# Patient Record
Sex: MALE | Race: WHITE | NOT HISPANIC OR LATINO | Employment: UNEMPLOYED | URBAN - NONMETROPOLITAN AREA
[De-identification: names, ages, dates, MRNs, and addresses within clinical notes are randomized per-mention and may not be internally consistent; named-entity substitution may affect disease eponyms.]

---

## 2019-01-01 ENCOUNTER — HOSPITAL ENCOUNTER (EMERGENCY)
Facility: HOSPITAL | Age: 49
Discharge: HOME | End: 2019-01-02
Attending: EMERGENCY MEDICINE | Admitting: EMERGENCY MEDICINE

## 2019-01-01 DIAGNOSIS — K04.7 DENTAL ABSCESS: Primary | ICD-10-CM

## 2019-01-01 PROCEDURE — 96372 THER/PROPH/DIAG INJ SC/IM: CPT

## 2019-01-01 PROCEDURE — 25010000002 CEFTRIAXONE PER 250 MG: Performed by: PHYSICIAN ASSISTANT

## 2019-01-01 PROCEDURE — 25010000002 KETOROLAC TROMETHAMINE PER 15 MG: Performed by: PHYSICIAN ASSISTANT

## 2019-01-01 PROCEDURE — 99283 EMERGENCY DEPT VISIT LOW MDM: CPT

## 2019-01-01 RX ORDER — KETOROLAC TROMETHAMINE 30 MG/ML
30 INJECTION, SOLUTION INTRAMUSCULAR; INTRAVENOUS ONCE
Status: COMPLETED | OUTPATIENT
Start: 2019-01-01 | End: 2019-01-01

## 2019-01-01 RX ORDER — CLINDAMYCIN HYDROCHLORIDE 300 MG/1
300 CAPSULE ORAL 3 TIMES DAILY
Qty: 21 CAPSULE | Refills: 0 | Status: SHIPPED | OUTPATIENT
Start: 2019-01-01

## 2019-01-01 RX ORDER — CEFTRIAXONE 1 G/1
1000 INJECTION, POWDER, FOR SOLUTION INTRAMUSCULAR; INTRAVENOUS ONCE
Status: COMPLETED | OUTPATIENT
Start: 2019-01-01 | End: 2019-01-01

## 2019-01-01 RX ORDER — LIDOCAINE HYDROCHLORIDE 10 MG/ML
2.1 INJECTION, SOLUTION EPIDURAL; INFILTRATION; INTRACAUDAL; PERINEURAL ONCE
Status: COMPLETED | OUTPATIENT
Start: 2019-01-01 | End: 2019-01-01

## 2019-01-01 RX ADMIN — KETOROLAC TROMETHAMINE 30 MG: 30 INJECTION, SOLUTION INTRAMUSCULAR; INTRAVENOUS at 23:32

## 2019-01-01 RX ADMIN — BENZOCAINE: 200 LIQUID DENTAL; ORAL; PERIODONTAL at 23:33

## 2019-01-01 RX ADMIN — LIDOCAINE HYDROCHLORIDE 2.1 ML: 10 INJECTION, SOLUTION EPIDURAL; INFILTRATION; INTRACAUDAL; PERINEURAL at 23:32

## 2019-01-01 RX ADMIN — CEFTRIAXONE SODIUM 1000 MG: 1 INJECTION, POWDER, FOR SOLUTION INTRAMUSCULAR; INTRAVENOUS at 23:32

## 2019-01-02 VITALS
DIASTOLIC BLOOD PRESSURE: 92 MMHG | HEART RATE: 100 BPM | SYSTOLIC BLOOD PRESSURE: 166 MMHG | BODY MASS INDEX: 35.21 KG/M2 | HEIGHT: 72 IN | WEIGHT: 260 LBS | RESPIRATION RATE: 16 BRPM | OXYGEN SATURATION: 98 % | TEMPERATURE: 98.9 F

## 2019-01-02 NOTE — ED PROVIDER NOTES
Subjective     History provided by:  Patient   used: No    Dental Pain   Location:  Lower  Lower teeth location:  18/LL 2nd molar  Quality:  Aching, dull and constant  Severity:  Moderate  Chronicity:  New  Context: abscess, dental caries, dental fracture and poor dentition    Relieved by:  Nothing  Worsened by:  Cold food/drink, hot food/drink, touching and jaw movement  Ineffective treatments:  Topical anesthetic gel  Associated symptoms: gum swelling    Associated symptoms: no difficulty swallowing and no fever    Risk factors: lack of dental care    Risk factors: no smoking        Review of Systems   Constitutional: Negative.  Negative for fever.   HENT: Positive for dental problem.    Respiratory: Negative.    Cardiovascular: Negative.  Negative for chest pain.   Gastrointestinal: Negative.  Negative for abdominal pain.   Endocrine: Negative.    Genitourinary: Negative.  Negative for dysuria.   Skin: Negative.    Neurological: Negative.    Psychiatric/Behavioral: Negative.    All other systems reviewed and are negative.      Past Medical History:   Diagnosis Date   • Back injury    • Hypertension        No Known Allergies    Past Surgical History:   Procedure Laterality Date   • BACK SURGERY  2017       History reviewed. No pertinent family history.    Social History     Socioeconomic History   • Marital status:      Spouse name: Not on file   • Number of children: Not on file   • Years of education: Not on file   • Highest education level: Not on file   Tobacco Use   • Smoking status: Never Smoker   Substance and Sexual Activity   • Alcohol use: No     Frequency: Never   • Drug use: Yes     Types: Marijuana   • Sexual activity: Defer           Objective   Physical Exam   Constitutional: He is oriented to person, place, and time. He appears well-developed and well-nourished. No distress.   HENT:   Head: Normocephalic and atraumatic.   Right Ear: External ear normal.   Left Ear:  External ear normal.   Nose: Nose normal.   Mouth/Throat: Uvula is midline. No trismus in the jaw. Abnormal dentition. Dental abscesses and dental caries present.       Eyes: Conjunctivae and EOM are normal. Pupils are equal, round, and reactive to light.   Neck: Normal range of motion. Neck supple. No JVD present. No tracheal deviation present.   Cardiovascular: Normal rate, regular rhythm and normal heart sounds.   No murmur heard.  Pulmonary/Chest: Effort normal and breath sounds normal. No respiratory distress. He has no wheezes.   Abdominal: Soft. Bowel sounds are normal. There is no tenderness.   Musculoskeletal: Normal range of motion. He exhibits no edema or deformity.   Neurological: He is alert and oriented to person, place, and time. No cranial nerve deficit.   Skin: Skin is warm and dry. No rash noted. He is not diaphoretic. No erythema. No pallor.   Psychiatric: He has a normal mood and affect. His behavior is normal. Thought content normal.   Nursing note and vitals reviewed.      Procedures           ED Course                  MDM  Number of Diagnoses or Management Options  Dental abscess: new and does not require workup  Risk of Complications, Morbidity, and/or Mortality  Presenting problems: moderate  Diagnostic procedures: minimal  Management options: moderate    Patient Progress  Patient progress: stable        Final diagnoses:   Dental abscess            Kartik Alba PA-C  01/02/19 6960

## 2021-01-04 ENCOUNTER — HOSPITAL ENCOUNTER (INPATIENT)
Facility: HOSPITAL | Age: 51
LOS: 3 days | Discharge: HOME | DRG: 383 | End: 2021-01-07
Attending: EMERGENCY MEDICINE | Admitting: INTERNAL MEDICINE
Payer: COMMERCIAL

## 2021-01-04 DIAGNOSIS — L03.115 CELLULITIS OF RIGHT LOWER EXTREMITY: Primary | ICD-10-CM

## 2021-01-04 PROBLEM — L03.90 CELLULITIS: Status: ACTIVE | Noted: 2021-01-04

## 2021-01-04 LAB
A/G RATIO: 1.3 (ref 0.8–2)
ALBUMIN SERPL-MCNC: 4.4 G/DL (ref 3.4–4.8)
ALP BLD-CCNC: 71 U/L (ref 25–100)
ALT SERPL-CCNC: 122 U/L (ref 4–36)
ANION GAP SERPL CALCULATED.3IONS-SCNC: 13 MMOL/L (ref 3–16)
AST SERPL-CCNC: 60 U/L (ref 8–33)
BASOPHILS ABSOLUTE: 0.1 K/UL (ref 0–0.1)
BASOPHILS RELATIVE PERCENT: 0.6 %
BILIRUB SERPL-MCNC: 0.6 MG/DL (ref 0.3–1.2)
BUN BLDV-MCNC: 11 MG/DL (ref 6–20)
CALCIUM SERPL-MCNC: 9.4 MG/DL (ref 8.5–10.5)
CHLORIDE BLD-SCNC: 98 MMOL/L (ref 98–107)
CO2: 24 MMOL/L (ref 20–30)
CREAT SERPL-MCNC: 0.7 MG/DL (ref 0.4–1.2)
EOSINOPHILS ABSOLUTE: 0.6 K/UL (ref 0–0.4)
EOSINOPHILS RELATIVE PERCENT: 4.7 %
GFR AFRICAN AMERICAN: >59
GFR NON-AFRICAN AMERICAN: >59
GLOBULIN: 3.3 G/DL
GLUCOSE BLD-MCNC: 93 MG/DL (ref 74–106)
HCT VFR BLD CALC: 51.3 % (ref 40–54)
HEMOGLOBIN: 17.2 G/DL (ref 13–18)
IMMATURE GRANULOCYTES #: 0.1 K/UL
IMMATURE GRANULOCYTES %: 0.4 % (ref 0–5)
LACTIC ACID: 1.8 MMOL/L (ref 0.4–2)
LYMPHOCYTES ABSOLUTE: 2.5 K/UL (ref 1.5–4)
LYMPHOCYTES RELATIVE PERCENT: 20.7 %
MCH RBC QN AUTO: 32.8 PG (ref 27–32)
MCHC RBC AUTO-ENTMCNC: 33.5 G/DL (ref 31–35)
MCV RBC AUTO: 97.9 FL (ref 80–100)
MONOCYTES ABSOLUTE: 1.1 K/UL (ref 0.2–0.8)
MONOCYTES RELATIVE PERCENT: 9.5 %
NEUTROPHILS ABSOLUTE: 7.7 K/UL (ref 2–7.5)
NEUTROPHILS RELATIVE PERCENT: 64.1 %
PDW BLD-RTO: 12.9 % (ref 11–16)
PLATELET # BLD: 181 K/UL (ref 150–400)
PMV BLD AUTO: 10.3 FL (ref 6–10)
POTASSIUM SERPL-SCNC: 4.2 MMOL/L (ref 3.4–5.1)
RBC # BLD: 5.24 M/UL (ref 4.5–6)
SARS-COV-2, NAAT: NOT DETECTED
SARS-COV-2, PCR: NORMAL
SEDIMENTATION RATE, ERYTHROCYTE: 8 MM/HR (ref 0–15)
SODIUM BLD-SCNC: 135 MMOL/L (ref 136–145)
TOTAL PROTEIN: 7.7 G/DL (ref 6.4–8.3)
WBC # BLD: 12 K/UL (ref 4–11)

## 2021-01-04 PROCEDURE — 83605 ASSAY OF LACTIC ACID: CPT

## 2021-01-04 PROCEDURE — 96365 THER/PROPH/DIAG IV INF INIT: CPT

## 2021-01-04 PROCEDURE — 80053 COMPREHEN METABOLIC PANEL: CPT

## 2021-01-04 PROCEDURE — 6360000002 HC RX W HCPCS: Performed by: PHYSICIAN ASSISTANT

## 2021-01-04 PROCEDURE — 6370000000 HC RX 637 (ALT 250 FOR IP): Performed by: PHYSICIAN ASSISTANT

## 2021-01-04 PROCEDURE — U0003 INFECTIOUS AGENT DETECTION BY NUCLEIC ACID (DNA OR RNA); SEVERE ACUTE RESPIRATORY SYNDROME CORONAVIRUS 2 (SARS-COV-2) (CORONAVIRUS DISEASE [COVID-19]), AMPLIFIED PROBE TECHNIQUE, MAKING USE OF HIGH THROUGHPUT TECHNOLOGIES AS DESCRIBED BY CMS-2020-01-R: HCPCS

## 2021-01-04 PROCEDURE — 36415 COLL VENOUS BLD VENIPUNCTURE: CPT

## 2021-01-04 PROCEDURE — 99282 EMERGENCY DEPT VISIT SF MDM: CPT

## 2021-01-04 PROCEDURE — 87040 BLOOD CULTURE FOR BACTERIA: CPT

## 2021-01-04 PROCEDURE — 6360000002 HC RX W HCPCS: Performed by: EMERGENCY MEDICINE

## 2021-01-04 PROCEDURE — U0002 COVID-19 LAB TEST NON-CDC: HCPCS

## 2021-01-04 PROCEDURE — 2580000003 HC RX 258: Performed by: EMERGENCY MEDICINE

## 2021-01-04 PROCEDURE — 2500000003 HC RX 250 WO HCPCS: Performed by: EMERGENCY MEDICINE

## 2021-01-04 PROCEDURE — 86140 C-REACTIVE PROTEIN: CPT

## 2021-01-04 PROCEDURE — 85025 COMPLETE CBC W/AUTO DIFF WBC: CPT

## 2021-01-04 PROCEDURE — 85652 RBC SED RATE AUTOMATED: CPT

## 2021-01-04 PROCEDURE — 96375 TX/PRO/DX INJ NEW DRUG ADDON: CPT

## 2021-01-04 PROCEDURE — 1200000000 HC SEMI PRIVATE

## 2021-01-04 RX ORDER — ACETAMINOPHEN 650 MG/1
650 SUPPOSITORY RECTAL EVERY 6 HOURS PRN
Status: DISCONTINUED | OUTPATIENT
Start: 2021-01-04 | End: 2021-01-07 | Stop reason: HOSPADM

## 2021-01-04 RX ORDER — FAMOTIDINE 20 MG/1
20 TABLET, FILM COATED ORAL 2 TIMES DAILY
Status: DISCONTINUED | OUTPATIENT
Start: 2021-01-04 | End: 2021-01-07 | Stop reason: HOSPADM

## 2021-01-04 RX ORDER — PROMETHAZINE HYDROCHLORIDE 25 MG/1
12.5 TABLET ORAL EVERY 6 HOURS PRN
Status: DISCONTINUED | OUTPATIENT
Start: 2021-01-04 | End: 2021-01-07 | Stop reason: HOSPADM

## 2021-01-04 RX ORDER — ONDANSETRON 2 MG/ML
4 INJECTION INTRAMUSCULAR; INTRAVENOUS ONCE
Status: COMPLETED | OUTPATIENT
Start: 2021-01-04 | End: 2021-01-04

## 2021-01-04 RX ORDER — ACETAMINOPHEN 325 MG/1
650 TABLET ORAL EVERY 6 HOURS PRN
Status: DISCONTINUED | OUTPATIENT
Start: 2021-01-04 | End: 2021-01-07 | Stop reason: HOSPADM

## 2021-01-04 RX ORDER — OXYCODONE HYDROCHLORIDE 5 MG/1
5 TABLET ORAL EVERY 4 HOURS PRN
Status: DISCONTINUED | OUTPATIENT
Start: 2021-01-04 | End: 2021-01-05

## 2021-01-04 RX ORDER — POLYETHYLENE GLYCOL 3350 17 G/17G
17 POWDER, FOR SOLUTION ORAL DAILY PRN
Status: DISCONTINUED | OUTPATIENT
Start: 2021-01-04 | End: 2021-01-07 | Stop reason: HOSPADM

## 2021-01-04 RX ORDER — ONDANSETRON 2 MG/ML
4 INJECTION INTRAMUSCULAR; INTRAVENOUS EVERY 6 HOURS PRN
Status: DISCONTINUED | OUTPATIENT
Start: 2021-01-04 | End: 2021-01-07 | Stop reason: HOSPADM

## 2021-01-04 RX ORDER — CLINDAMYCIN PHOSPHATE 900 MG/50ML
900 INJECTION INTRAVENOUS ONCE
Status: COMPLETED | OUTPATIENT
Start: 2021-01-04 | End: 2021-01-04

## 2021-01-04 RX ORDER — MORPHINE SULFATE 10 MG/ML
10 INJECTION, SOLUTION INTRAMUSCULAR; INTRAVENOUS ONCE
Status: COMPLETED | OUTPATIENT
Start: 2021-01-04 | End: 2021-01-04

## 2021-01-04 RX ORDER — 0.9 % SODIUM CHLORIDE 0.9 %
1000 INTRAVENOUS SOLUTION INTRAVENOUS ONCE
Status: COMPLETED | OUTPATIENT
Start: 2021-01-04 | End: 2021-01-04

## 2021-01-04 RX ORDER — CLINDAMYCIN PHOSPHATE 600 MG/50ML
600 INJECTION INTRAVENOUS EVERY 8 HOURS
Status: DISCONTINUED | OUTPATIENT
Start: 2021-01-05 | End: 2021-01-07 | Stop reason: HOSPADM

## 2021-01-04 RX ADMIN — FAMOTIDINE 20 MG: 20 TABLET, FILM COATED ORAL at 21:56

## 2021-01-04 RX ADMIN — ENOXAPARIN SODIUM 40 MG: 40 INJECTION SUBCUTANEOUS at 21:56

## 2021-01-04 RX ADMIN — OXYCODONE HYDROCHLORIDE 5 MG: 5 TABLET ORAL at 21:56

## 2021-01-04 RX ADMIN — MORPHINE SULFATE 10 MG: 10 INJECTION INTRAVENOUS at 20:30

## 2021-01-04 RX ADMIN — SODIUM CHLORIDE 1000 ML: 9 INJECTION, SOLUTION INTRAVENOUS at 20:30

## 2021-01-04 RX ADMIN — ONDANSETRON 4 MG: 2 INJECTION INTRAMUSCULAR; INTRAVENOUS at 20:30

## 2021-01-04 RX ADMIN — CLINDAMYCIN IN 5 PERCENT DEXTROSE 900 MG: 18 INJECTION, SOLUTION INTRAVENOUS at 20:30

## 2021-01-04 ASSESSMENT — PAIN DESCRIPTION - DESCRIPTORS: DESCRIPTORS: STABBING;SHARP

## 2021-01-04 ASSESSMENT — PAIN DESCRIPTION - PROGRESSION: CLINICAL_PROGRESSION: GRADUALLY WORSENING

## 2021-01-04 ASSESSMENT — PAIN DESCRIPTION - LOCATION: LOCATION: LEG

## 2021-01-04 ASSESSMENT — PAIN DESCRIPTION - FREQUENCY: FREQUENCY: CONTINUOUS

## 2021-01-04 ASSESSMENT — PAIN SCALES - GENERAL: PAINLEVEL_OUTOF10: 10

## 2021-01-05 ENCOUNTER — APPOINTMENT (OUTPATIENT)
Dept: CT IMAGING | Facility: HOSPITAL | Age: 51
DRG: 383 | End: 2021-01-05
Payer: COMMERCIAL

## 2021-01-05 PROBLEM — I10 HYPERTENSION: Status: ACTIVE | Noted: 2021-01-05

## 2021-01-05 PROBLEM — E66.811 OBESITY (BMI 30.0-34.9): Status: ACTIVE | Noted: 2021-01-05

## 2021-01-05 PROBLEM — E66.9 OBESITY (BMI 30.0-34.9): Status: ACTIVE | Noted: 2021-01-05

## 2021-01-05 PROBLEM — R74.01 TRANSAMINITIS: Status: ACTIVE | Noted: 2021-01-05

## 2021-01-05 PROBLEM — Z72.0 TOBACCO ABUSE: Status: ACTIVE | Noted: 2021-01-05

## 2021-01-05 LAB
A/G RATIO: 1.1 (ref 0.8–2)
ALBUMIN SERPL-MCNC: 3.7 G/DL (ref 3.4–4.8)
ALP BLD-CCNC: 61 U/L (ref 25–100)
ALT SERPL-CCNC: 98 U/L (ref 4–36)
ANION GAP SERPL CALCULATED.3IONS-SCNC: 11 MMOL/L (ref 3–16)
AST SERPL-CCNC: 48 U/L (ref 8–33)
BILIRUB SERPL-MCNC: 0.5 MG/DL (ref 0.3–1.2)
BUN BLDV-MCNC: 10 MG/DL (ref 6–20)
C-REACTIVE PROTEIN: 41.9 MG/L (ref 0–5.1)
CALCIUM SERPL-MCNC: 8.5 MG/DL (ref 8.5–10.5)
CHLORIDE BLD-SCNC: 99 MMOL/L (ref 98–107)
CO2: 24 MMOL/L (ref 20–30)
CREAT SERPL-MCNC: 0.7 MG/DL (ref 0.4–1.2)
GFR AFRICAN AMERICAN: >59
GFR NON-AFRICAN AMERICAN: >59
GLOBULIN: 3.4 G/DL
GLUCOSE BLD-MCNC: 84 MG/DL (ref 74–106)
HCT VFR BLD CALC: 45.5 % (ref 40–54)
HEMOGLOBIN: 15.3 G/DL (ref 13–18)
MCH RBC QN AUTO: 32.6 PG (ref 27–32)
MCHC RBC AUTO-ENTMCNC: 33.6 G/DL (ref 31–35)
MCV RBC AUTO: 97 FL (ref 80–100)
PDW BLD-RTO: 12.9 % (ref 11–16)
PLATELET # BLD: 185 K/UL (ref 150–400)
PMV BLD AUTO: 10.2 FL (ref 6–10)
POTASSIUM REFLEX MAGNESIUM: 4 MMOL/L (ref 3.4–5.1)
RBC # BLD: 4.69 M/UL (ref 4.5–6)
SODIUM BLD-SCNC: 134 MMOL/L (ref 136–145)
TOTAL PROTEIN: 7.1 G/DL (ref 6.4–8.3)
WBC # BLD: 10.7 K/UL (ref 4–11)

## 2021-01-05 PROCEDURE — 80074 ACUTE HEPATITIS PANEL: CPT

## 2021-01-05 PROCEDURE — 73701 CT LOWER EXTREMITY W/DYE: CPT

## 2021-01-05 PROCEDURE — 85027 COMPLETE CBC AUTOMATED: CPT

## 2021-01-05 PROCEDURE — 97161 PT EVAL LOW COMPLEX 20 MIN: CPT

## 2021-01-05 PROCEDURE — 6370000000 HC RX 637 (ALT 250 FOR IP): Performed by: PHYSICIAN ASSISTANT

## 2021-01-05 PROCEDURE — 80053 COMPREHEN METABOLIC PANEL: CPT

## 2021-01-05 PROCEDURE — 1200000000 HC SEMI PRIVATE

## 2021-01-05 PROCEDURE — 6360000004 HC RX CONTRAST MEDICATION: Performed by: INTERNAL MEDICINE

## 2021-01-05 PROCEDURE — 99222 1ST HOSP IP/OBS MODERATE 55: CPT | Performed by: INTERNAL MEDICINE

## 2021-01-05 PROCEDURE — 6360000002 HC RX W HCPCS: Performed by: PHYSICIAN ASSISTANT

## 2021-01-05 PROCEDURE — 2500000003 HC RX 250 WO HCPCS: Performed by: PHYSICIAN ASSISTANT

## 2021-01-05 PROCEDURE — 36415 COLL VENOUS BLD VENIPUNCTURE: CPT

## 2021-01-05 PROCEDURE — 97165 OT EVAL LOW COMPLEX 30 MIN: CPT

## 2021-01-05 RX ORDER — MORPHINE SULFATE 4 MG/ML
4 INJECTION, SOLUTION INTRAMUSCULAR; INTRAVENOUS EVERY 4 HOURS PRN
Status: DISCONTINUED | OUTPATIENT
Start: 2021-01-05 | End: 2021-01-06

## 2021-01-05 RX ORDER — IBUPROFEN 400 MG/1
400 TABLET ORAL EVERY 6 HOURS PRN
Status: DISCONTINUED | OUTPATIENT
Start: 2021-01-05 | End: 2021-01-07 | Stop reason: HOSPADM

## 2021-01-05 RX ORDER — OXYCODONE HYDROCHLORIDE 5 MG/1
10 TABLET ORAL EVERY 4 HOURS PRN
Status: DISCONTINUED | OUTPATIENT
Start: 2021-01-05 | End: 2021-01-07 | Stop reason: HOSPADM

## 2021-01-05 RX ADMIN — MORPHINE SULFATE 4 MG: 4 INJECTION INTRAVENOUS at 09:31

## 2021-01-05 RX ADMIN — FAMOTIDINE 20 MG: 20 TABLET, FILM COATED ORAL at 08:19

## 2021-01-05 RX ADMIN — FAMOTIDINE 20 MG: 20 TABLET, FILM COATED ORAL at 20:22

## 2021-01-05 RX ADMIN — OXYCODONE HYDROCHLORIDE 5 MG: 5 TABLET ORAL at 01:54

## 2021-01-05 RX ADMIN — IOPAMIDOL 100 ML: 755 INJECTION, SOLUTION INTRAVENOUS at 12:05

## 2021-01-05 RX ADMIN — OXYCODONE HYDROCHLORIDE 10 MG: 5 TABLET ORAL at 21:05

## 2021-01-05 RX ADMIN — ENOXAPARIN SODIUM 40 MG: 40 INJECTION SUBCUTANEOUS at 20:22

## 2021-01-05 RX ADMIN — IBUPROFEN 400 MG: 400 TABLET ORAL at 08:19

## 2021-01-05 RX ADMIN — MORPHINE SULFATE 4 MG: 4 INJECTION INTRAVENOUS at 04:21

## 2021-01-05 RX ADMIN — SILVER SULFADIAZINE: 10 CREAM TOPICAL at 14:08

## 2021-01-05 RX ADMIN — IBUPROFEN 400 MG: 400 TABLET ORAL at 20:22

## 2021-01-05 RX ADMIN — CLINDAMYCIN IN 5 PERCENT DEXTROSE 600 MG: 12 INJECTION, SOLUTION INTRAVENOUS at 20:22

## 2021-01-05 RX ADMIN — MORPHINE SULFATE 4 MG: 4 INJECTION INTRAVENOUS at 18:23

## 2021-01-05 RX ADMIN — CLINDAMYCIN IN 5 PERCENT DEXTROSE 600 MG: 12 INJECTION, SOLUTION INTRAVENOUS at 12:15

## 2021-01-05 RX ADMIN — MORPHINE SULFATE 4 MG: 4 INJECTION INTRAVENOUS at 14:06

## 2021-01-05 RX ADMIN — ACETAMINOPHEN 650 MG: 325 TABLET, FILM COATED ORAL at 12:21

## 2021-01-05 RX ADMIN — CLINDAMYCIN IN 5 PERCENT DEXTROSE 600 MG: 12 INJECTION, SOLUTION INTRAVENOUS at 03:48

## 2021-01-05 ASSESSMENT — PAIN SCALES - GENERAL
PAINLEVEL_OUTOF10: 10
PAINLEVEL_OUTOF10: 10
PAINLEVEL_OUTOF10: 0
PAINLEVEL_OUTOF10: 10
PAINLEVEL_OUTOF10: 5
PAINLEVEL_OUTOF10: 10
PAINLEVEL_OUTOF10: 8
PAINLEVEL_OUTOF10: 9
PAINLEVEL_OUTOF10: 8

## 2021-01-05 NOTE — H&P
History:   TOBACCO:   reports that he has been smoking cigarettes. He has a 20.00 pack-year smoking history. He has never used smokeless tobacco.  ETOH:   reports current alcohol use. OCCUPATION:  None     Family History:   History reviewed. No pertinent family history. Review of system  Constitutional:  Denies fever or chills   Eyes:  Denies eye pain or redness  HENT:  Denies nasal congestion or sore throat   Respiratory:  Denies cough or shortness of breath   Cardiovascular:  Denies chest pain or edema   GI:  Denies abdominal pain, nausea, vomiting, bloody stools or diarrhea   :  Denies dysuria or frequency  Musculoskeletal:  Denies acute neck pain or body aches  Integument:  Denies rash or itching. Positive for right lower extremity cellulitis and pain. Neurologic:  Denies headache, dizziness, numbness, tingling or unilateral weakness  Psychiatric:  Denies acute depression or acute anxiety      Vital Signs  Temp: 98.2 °F (36.8 °C)  Pulse: 88  Resp: 18  BP: 120/84  SpO2: 96 %  O2 Device: None (Room air)       vital signs reviewed in electronic chart. Physical exam  Constitutional:  Well developed, well nourished, male lying in bed in no acute distress. With palpation of right lower extremity cellulitis, patient endorses severe pain. Eyes:  PERRL, no scleral icterus, conjunctiva normal   HENT:  Atraumatic, external ears normal, nose normal, oropharynx moist, no pharyngeal exudates. Neck- supple, no JVD, no lymphadenopathy  Respiratory:  No respiratory distress, no wheezing, rales or rhonchi detected  Cardiovascular:  Normal rate, normal rhythm, no murmurs, no gallops, no rubs, no edema   GI:  Soft, nondistended, normal bowel sounds, nontender, no voluntary guarding  Musculoskeletal:  No cyanosis or obvious acute deformity. Moving all extremities   Integument:  Warm and dry. RLL tattoo present in area of cellulitis.  Right lower extremity cellulitis extending from below the right knee to above the right ankle circumferentially. Patient endorses severe pain with light palpation of the cellulitic area. No area of induration appreciated. Approximate 2 cm circumferential wound laterally with no drainage noted. No odor noted. Lymphatic:  No cervical or axillary lymphadenopathy noted   Neurologic:  Alert & oriented x 3, no apparent focal deficits noted   Psychiatric:  Speech and behavior appropriate         Lab Results   Component Value Date     (L) 01/05/2021    K 4.0 01/05/2021    CL 99 01/05/2021    CO2 24 01/05/2021    BUN 10 01/05/2021    CREATININE 0.7 01/05/2021    GLUCOSE 84 01/05/2021    CALCIUM 8.5 01/05/2021    PROT 7.1 01/05/2021    LABALBU 3.7 01/05/2021    BILITOT 0.5 01/05/2021    ALKPHOS 61 01/05/2021    AST 48 (H) 01/05/2021    ALT 98 (H) 01/05/2021    LABGLOM >59 01/05/2021    GFRAA >59 01/05/2021    AGRATIO 1.1 01/05/2021    GLOB 3.4 01/05/2021           Lab Results   Component Value Date    WBC 10.7 01/05/2021    HGB 15.3 01/05/2021    HCT 45.5 01/05/2021    MCV 97.0 01/05/2021     01/05/2021       PA/lat CXR:   CT TIBIA FIBULA RIGHT W CONTRAST    (Results Pending)         Assessment and Plan     Active Hospital Problems    Diagnosis Date Noted    Transaminitis [R74.01]  -denies any hx of liver disease or acute abdominal pain  -check acute hepatitis panel   01/05/2021    Obesity (BMI 30.0-34. 9) [E66.9]  -BMI 33.36  -counseled on diet and exercise after recovery   01/05/2021    Hypertension [I10]  -no meds listed on MAR  -BP currently controlled; continue to monitor   01/05/2021    Tobacco abuse [Z72.0]  -offer NRT  -counseled on cessation   01/05/2021    Cellulitis [L03.90]  -of RLL in area of old RLE tattoo  -hx of MRSA requiring multiple I&D's in the past  -RLE CT showing cellulitis, no evidence of abscess  -PRN IV Morphine and PRN oxycodone for pain; bowel regimen while receiving narcotics  -elevate RLE  -Continue IV Clindamycin; if no improvement in the next 24 hours, will consider broadening antibiotic coverage  -ESR 8 on 1/4  -CRP 1/4 pending  -blood cultures pending  -topical silvadene 01/04/2021     Patient was seen and examined by Dr. Ivon Coello and plan of care reviewed.       Amrita Melvin certifies per [employer] regulation for 42 .15(a), that the patient may reasonably be expected to be discharged or transferred to a hospital within 96 hours after admission to [address] [place] [address]    Electronically signed by MADALYN Muñoz on 1/5/2021 at 10:51 AM

## 2021-01-05 NOTE — ED PROVIDER NOTES
History    Marital status: Unknown     Spouse name: None    Number of children: None    Years of education: None    Highest education level: None   Occupational History    None   Social Needs    Financial resource strain: None    Food insecurity     Worry: None     Inability: None    Transportation needs     Medical: None     Non-medical: None   Tobacco Use    Smoking status: Current Every Day Smoker     Packs/day: 1.00     Years: 20.00     Pack years: 20.00     Types: Cigarettes    Smokeless tobacco: Never Used   Substance and Sexual Activity    Alcohol use: Yes     Comment: Occassional    Drug use: Not Currently    Sexual activity: None   Lifestyle    Physical activity     Days per week: None     Minutes per session: None    Stress: None   Relationships    Social connections     Talks on phone: None     Gets together: None     Attends Restorationist service: None     Active member of club or organization: None     Attends meetings of clubs or organizations: None     Relationship status: None    Intimate partner violence     Fear of current or ex partner: None     Emotionally abused: None     Physically abused: None     Forced sexual activity: None   Other Topics Concern    None   Social History Narrative    None         PHYSICAL EXAM    (up to 7 forlevel 4, 8 or more for level 5)     ED Triage Vitals [01/04/21 1926]   BP Temp Temp Source Pulse Resp SpO2 Height Weight   (!) 135/96 97 °F (36.1 °C) Tympanic 103 16 98 % 6' (1.829 m) 240 lb (108.9 kg)       Physical Exam  General :Patient is awake, alert, oriented, moaning in pain  HEENT: Pupils are equally round and reactive to light, EOMI. Cardiac: Heart regular rate, rhythm, no murmurs, rubs, or gallops  Lungs: Lungs are clear to auscultation, there is no wheezing, rhonchi, or rales. Abdomen:Abdomen is soft, nontender, nondistended.    Musculoskeletal: Ambulatory; there is an extensive right lower extremity cellulitis that is proximal to the right lateral knee and extends all the way to the ankle and is circumferential around the calf; it is warm, tender and swollen to palpation; there is a 2 cm diameter area of epidermal denudation with a small scab without drainage. Right lower extremity is neurovascularly intact  Back: No midline or bony tenderness. Dermatology: Skin is warm and dry  Psych: Mentation is grossly normal, cognition is grossly normal. Affect is appropriate.       DIAGNOSTIC RESULTS       RADIOLOGY:   Non-plain film images such as CT, Ultrasoundand MRI are read by the radiologist. Plain radiographic images are visualized and preliminarily interpreted by the emergency physician with the below findings:      [] Radiologist's Report Reviewed:  No orders to display         ED BEDSIDE ULTRASOUND:   Performed by ED Physician - none    LABS:  Labs Reviewed   CBC WITH AUTO DIFFERENTIAL - Abnormal; Notable for the following components:       Result Value    WBC 12.0 (*)     MCH 32.8 (*)     MPV 10.3 (*)     Neutrophils Absolute 7.7 (*)     Monocytes Absolute 1.1 (*)     Eosinophils Absolute 0.6 (*)     All other components within normal limits    Narrative:     Performed at:  [address] [place] [address] Laboratory  [address] [place] [address], [place] [address]   Phone [phone]   COMPREHENSIVE METABOLIC PANEL - Abnormal; Notable for the following components:    Sodium 135 (*)      (*)     AST 60 (*)     All other components within normal limits    Narrative:     Performed at:  [address] [place] [address] Laboratory  [address] [place] [address],  [place], [place] [address]   Phone [phone]   CBC - Abnormal; Notable for the following components:    MCH 32.6 (*)     MPV 10.2 (*)     All other components within normal limits    Narrative:     Performed at:  [address] [place] [address] Laboratory  [address] [place] [address],  [place], [place] [address]   Phone [phone]   CULTURE, BLOOD 1   CULTURE, BLOOD 2   LACTIC ACID, PLASMA    Narrative:     Performed at:  [address] [place] [address] Laboratory  [address] [place], [place] [address], [place] [address]   Phone [phone]   SEDIMENTATION RATE    Narrative:     Performed at:  [address] [place] [address] Laboratory  [address] [place], [place] [address], [place] [address]   Phone [phone]    Narrative:     Performed at:  [address] [place] [address] Laboratory  [address] [place], [place] [address], [place] [address]   Phone [phone]   C-REACTIVE PROTEIN   COMPREHENSIVE METABOLIC PANEL W/ REFLEX TO MG FOR LOW K       I have reviewed and interpreted all of the currently available lab resultsfrom this visit (if applicable):  Results for orders placed or performed during the hospital encounter of 01/04/21   CBC Auto Differential   Result Value Ref Range    WBC 12.0 (H) 4.0 - 11.0 K/uL    RBC 5.24 4.50 - 6.00 M/uL    Hemoglobin 17.2 13.0 - 18.0 g/dL    Hematocrit 51.3 40.0 - 54.0 %    MCV 97.9 80.0 - 100.0 fL    MCH 32.8 (H) 27.0 - 32.0 pg    MCHC 33.5 31.0 - 35.0 g/dL    RDW 12.9 11.0 - 16.0 %    Platelets 297 713 - 940 K/uL    MPV 10.3 (H) 6.0 - 10.0 fL    Neutrophils % 64.1 %    Immature Granulocytes % 0.4 0.0 - 5.0 %    Lymphocytes % 20.7 %    Monocytes % 9.5 %    Eosinophils % 4.7 %    Basophils % 0.6 %    Neutrophils Absolute 7.7 (H) 2.0 - 7.5 K/uL    Immature Granulocytes # 0.1 K/uL    Lymphocytes Absolute 2.5 1.5 - 4.0 K/uL    Monocytes Absolute 1.1 (H) 0.2 - 0.8 K/uL    Eosinophils Absolute 0.6 (H) 0.0 - 0.4 K/uL    Basophils Absolute 0.1 0.0 - 0.1 K/uL   Comprehensive Metabolic Panel   Result Value Ref Range    Sodium 135 (L) 136 - 145 mmol/L    Potassium 4.2 3.4 - 5.1 mmol/L    Chloride 98 98 - 107 mmol/L    CO2 24 20 - 30 mmol/L    Anion Gap 13 3 - 16    Glucose 93 74 - 106 mg/dL    BUN 11 6 - 20 mg/dL    CREATININE 0.7 0.4 - 1.2 mg/dL    GFR Non-African American >59 >59    GFR African American >59 >59    Calcium 9.4 8.5 - 10.5 mg/dL Total Protein 7.7 6.4 - 8.3 g/dL    Alb 4.4 3.4 - 4.8 g/dL    Albumin/Globulin Ratio 1.3 0.8 - 2.0    Total Bilirubin 0.6 0.3 - 1.2 mg/dL    Alkaline Phosphatase 71 25 - 100 U/L     (H) 4 - 36 U/L    AST 60 (H) 8 - 33 U/L    Globulin 3.3 g/dL   Lactic Acid, Plasma   Result Value Ref Range    Lactic Acid 1.8 0.4 - 2.0 mmol/L   Sedimentation Rate   Result Value Ref Range    Sed Rate 8 0 - 15 mm/Hr   COVID-19   Result Value Ref Range    SARS-CoV-2, NAAT Not Detected Not Detected    SARS-CoV-2, PCR Not performed Not Detected   CBC   Result Value Ref Range    WBC 10.7 4.0 - 11.0 K/uL    RBC 4.69 4.50 - 6.00 M/uL    Hemoglobin 15.3 13.0 - 18.0 g/dL    Hematocrit 45.5 40.0 - 54.0 %    MCV 97.0 80.0 - 100.0 fL    MCH 32.6 (H) 27.0 - 32.0 pg    MCHC 33.6 31.0 - 35.0 g/dL    RDW 12.9 11.0 - 16.0 %    Platelets 723 294 - 008 K/uL    MPV 10.2 (H) 6.0 - 10.0 fL        All other labs were within normal range or not returned as of this dictation. EMERGENCY DEPARTMENT COURSE and DIFFERENTIAL DIAGNOSIS/MDM:   Vitals:    Vitals:    01/04/21 2129 01/04/21 2235 01/05/21 0114 01/05/21 0501   BP: (!) 154/97 119/74 135/80 (!) 146/79   Pulse: 94  91 94   Resp: 22  20 20   Temp: 98.3 °F (36.8 °C)  98 °F (36.7 °C) 98.3 °F (36.8 °C)   TempSrc:   Oral Oral   SpO2: 96%  97% 96%   Weight: 246 lb (111.6 kg)      Height: 6' (1.829 m)          Patient has a large cellulitis to his right lower extremity. A cellulitis of the size that is also circumferential, cannot be managed with outpatient antibiotics. The patient will be admitted for IV antibiotics. CONSULTS:  IP CONSULT TO CASE MANAGEMENT    PROCEDURES:  Procedures    CRITICAL CARE TIME    Total Critical Care time was 0 minutes, excluding separately reportable procedures. There was a high probability of clinically significant/life threatening deterioration in the patient's condition which required my urgent intervention. FINAL IMPRESSION      1.  Cellulitis of right lower extremity          DISPOSITION/PLAN   DISPOSITION Decision To Admit 01/04/2021 08:52:32 PM      PATIENT REFERRED TO:  No follow-up provider specified. DISCHARGE MEDICATIONS:  There are no discharge medications for this patient. Comment: Please note this report has been produced using speech recognition software and may contain errors related tothat system including errors in grammar, punctuation, and spelling, as well as words and phrases that may be inappropriate. If there are any questions or concerns please feel free to contact the dictating provider forclarification.     Joselin Girard MD  Attending Emergency Physician                  Joselin Girard MD  01/05/21 6405

## 2021-01-05 NOTE — PLAN OF CARE
Problem: Infection:  Goal: Will remain free from infection  Description: Will remain free from infection  1/5/2021 0823 by Gerardo Knight RN  Outcome: Ongoing  1/4/2021 2321 by Lynn Resendiz LPN  Outcome: Ongoing     Problem: Safety:  Goal: Free from accidental physical injury  Description: Free from accidental physical injury  1/5/2021 0823 by Gerardo Knight RN  Outcome: Ongoing  1/4/2021 2321 by Lynn Resendiz LPN  Outcome: Ongoing  Goal: Free from intentional harm  Description: Free from intentional harm  1/5/2021 0823 by Gerardo Knight RN  Outcome: Ongoing  1/4/2021 2321 by Lynn Resendiz LPN  Outcome: Ongoing     Problem: Pain:  Goal: Patient's pain/discomfort is manageable  Description: Patient's pain/discomfort is manageable  Outcome: Ongoing

## 2021-01-05 NOTE — PROGRESS NOTES
Physical Therapy    Facility/Department: [facility] MED SURG  Initial Assessment    NAME: Vanessa Contrreas  : 1970  MRN: [id number]    Date of Service: 2021    Assessment   Assessment: Pt demonstrates some difficulty with ambulation due to RLE cellulitis, but is able to use a walker for safe ambulation. He does not require skilled PT in the acute setting at this time. Pt was advised to use walker for mobility to prevent pain from WB on RLE. Treatment Diagnosis: RLE cellulits  Prognosis: Good  Decision Making: Low Complexity  No Skilled PT: Independent with functional mobility   REQUIRES PT FOLLOW UP: No  Activity Tolerance  Activity Tolerance: Patient Tolerated treatment well       Patient Diagnosis(es): The encounter diagnosis was Cellulitis of right lower extremity. has a past medical history of Hypertension and Staph infection. has a past surgical history that includes back surgery; Appendectomy; and Abscess Drainage. Restrictions  Restrictions/Precautions  Restrictions/Precautions: General Precautions, Fall Risk  Required Braces or Orthoses?: No     Vision/Hearing  Vision: Within Functional Limits  Hearing: Within functional limits       Subjective  General  Chart Reviewed: Yes  Patient assessed for rehabilitation services?: Yes  Family / Caregiver Present: No  Referring Practitioner: Reema Cespedes MD  Referral Date : 21  Diagnosis: Cellulitis  Subjective  Subjective: Pt presents supine in bed with RLE propped on pillows. Pt states he has been using the RW to get to the restroom and back to bed while in the room.   Pain Screening  Patient Currently in Pain: Yes  Vital Signs  Patient Currently in Pain: Yes       Orientation  Orientation  Overall Orientation Status: Within Normal Limits     Social/Functional History  Social/Functional History  ADL Assistance: Independent  Homemaking Assistance: Independent  Ambulation Assistance: Independent  Transfer Assistance: Independent  Additional

## 2021-01-05 NOTE — CARE COORDINATION
Pt is independent at baseline. Therapy recommends a RW for balance and assistance with weight status due to pain while here. CM will monitor need for RW at DC. No other DC needs noted at this time.

## 2021-01-05 NOTE — PROGRESS NOTES
Occupational Therapy   Occupational Therapy Initial Assessment  Date: 2021   Patient Name: Ryan More  MRN: [id number]     : 1970    Date of Service: 2021    Discharge Recommendations:     OT Equipment Recommendations  Equipment Needed: Yes  Mobility Devices: Michelle Sina: Rolling    Assessment   Assessment: Pt agreeable to OT services. Pt presents with cellulits of RLE. Pt reports increased pain with weight bearing activity. Pt independent with bed mobility. Pt donned socks having difficulty with RLE. OT provided sock aid. OT demonstrated hopping technique on walker to decrease painful weight bearing on RLE. Pt demonstrated good follow through with technique. Pt ambulated to and from bathroom without difficulty. Pt toileted with MOD I. Pt at baseline with ADL's and no skilled OT services warranted at this time. Prognosis: Good  Decision Making: Low Complexity  No Skilled OT: Independent with ADL's  REQUIRES OT FOLLOW UP: No  Activity Tolerance  Activity Tolerance: Patient limited by pain           Patient Diagnosis(es): The encounter diagnosis was Cellulitis of right lower extremity. has a past medical history of Hypertension and Staph infection. has a past surgical history that includes back surgery; Appendectomy; and Abscess Drainage. Restrictions  Restrictions/Precautions  Restrictions/Precautions: General Precautions, Fall Risk  Required Braces or Orthoses?: No    Subjective   General  Chart Reviewed: Yes  Patient assessed for rehabilitation services?: Yes  Family / Caregiver Present: No  Referring Practitioner: Rafael Lewis MD  Diagnosis: cellulitis RLE  Subjective  Subjective: Pt states he has increased pain from cellulitis and pain with weight bearing activity. Pt agreeable to OT services.   Patient Currently in Pain: Yes  Pain Assessment  Pain Level: 5  Vital Signs  Patient Currently in Pain: Yes  Social/Functional History  Social/Functional History  ADL Assistance: Independent  Homemaking Assistance: Independent  Ambulation Assistance: Independent  Transfer Assistance: Independent  Additional Comments: Pt lives with family. Pt reports he is independent prior to hospitalization       Objective   Vision: Within Functional Limits  Hearing: Within functional limits    Orientation  Overall Orientation Status: Within Functional Limits  Observation/Palpation  Observation: weeping wound on RLE, room air, pleasant and cooperative  Edema: RLE  Balance  Sitting Balance: Independent  Standing Balance: Supervision  Functional Mobility  Functional - Mobility Device: Rolling Walker  Activity: To/from bathroom  Assist Level: Modified independent   ADL  LE Dressing: Modified independent   Toileting: Independent  Tone RUE  RUE Tone: Normotonic  Tone LUE  LUE Tone: Normotonic     Bed mobility  Supine to Sit: Independent  Sit to Supine: Independent  Scooting: Independent  Transfers  Stand Pivot Transfers: Modified independent  Sit to stand: Modified independent     Cognition  Overall Cognitive Status: WFL                 LUE AROM (degrees)  LUE AROM : WFL  RUE AROM (degrees)  RUE AROM : WFL          Therapy Time   Individual Concurrent Group Co-treatment   Time In 1110         Time Out 1125         Minutes 15              This note serves as a DC summary in the event of pt discharge.      Lindsay Troncoso, OTR/L

## 2021-01-05 NOTE — ACP (ADVANCE CARE PLANNING)
Advance Care Planning     General Advance Care Planning (ACP) Conversation    Date of Conversation: 1/4/2021  Conducted with: Patient with Decision Making Capacity per nurse    Healthcare Decision Maker:    Kimberly [facility] [phone]    Content/Action Overview:  Reviewed DNR/DNI and patient elects DNR order - completed portable DNR form & placed order    Length of Voluntary ACP Conversation in minutes:  <16 minutes (Non-Billable)    Johann Minaya

## 2021-01-06 PROBLEM — B19.20 HEPATITIS C: Status: ACTIVE | Noted: 2021-01-06

## 2021-01-06 LAB
A/G RATIO: 1.1 (ref 0.8–2)
ALBUMIN SERPL-MCNC: 3.5 G/DL (ref 3.4–4.8)
ALP BLD-CCNC: 56 U/L (ref 25–100)
ALT SERPL-CCNC: 91 U/L (ref 4–36)
ANION GAP SERPL CALCULATED.3IONS-SCNC: 9 MMOL/L (ref 3–16)
AST SERPL-CCNC: 51 U/L (ref 8–33)
BILIRUB SERPL-MCNC: 0.4 MG/DL (ref 0.3–1.2)
BUN BLDV-MCNC: 13 MG/DL (ref 6–20)
CALCIUM SERPL-MCNC: 8.4 MG/DL (ref 8.5–10.5)
CHLORIDE BLD-SCNC: 101 MMOL/L (ref 98–107)
CO2: 25 MMOL/L (ref 20–30)
CREAT SERPL-MCNC: 0.7 MG/DL (ref 0.4–1.2)
GFR AFRICAN AMERICAN: >59
GFR NON-AFRICAN AMERICAN: >59
GLOBULIN: 3.2 G/DL
GLUCOSE BLD-MCNC: 121 MG/DL (ref 74–106)
HAV IGM SER IA-ACNC: ABNORMAL
HCT VFR BLD CALC: 45.7 % (ref 40–54)
HEMOGLOBIN: 15 G/DL (ref 13–18)
HEPATITIS B CORE IGM ANTIBODY: ABNORMAL
HEPATITIS B SURFACE ANTIGEN INTERPRETATION: ABNORMAL
HEPATITIS C ANTIBODY INTERPRETATION: REACTIVE
MCH RBC QN AUTO: 32.1 PG (ref 27–32)
MCHC RBC AUTO-ENTMCNC: 32.8 G/DL (ref 31–35)
MCV RBC AUTO: 97.9 FL (ref 80–100)
PDW BLD-RTO: 12.8 % (ref 11–16)
PLATELET # BLD: 183 K/UL (ref 150–400)
PMV BLD AUTO: 9.7 FL (ref 6–10)
POTASSIUM REFLEX MAGNESIUM: 4.3 MMOL/L (ref 3.4–5.1)
RBC # BLD: 4.67 M/UL (ref 4.5–6)
SODIUM BLD-SCNC: 135 MMOL/L (ref 136–145)
TOTAL PROTEIN: 6.7 G/DL (ref 6.4–8.3)
WBC # BLD: 7.9 K/UL (ref 4–11)

## 2021-01-06 PROCEDURE — 36415 COLL VENOUS BLD VENIPUNCTURE: CPT

## 2021-01-06 PROCEDURE — 87077 CULTURE AEROBIC IDENTIFY: CPT

## 2021-01-06 PROCEDURE — 1200000000 HC SEMI PRIVATE

## 2021-01-06 PROCEDURE — 87522 HEPATITIS C REVRS TRNSCRPJ: CPT

## 2021-01-06 PROCEDURE — 6360000002 HC RX W HCPCS: Performed by: PHYSICIAN ASSISTANT

## 2021-01-06 PROCEDURE — 99232 SBSQ HOSP IP/OBS MODERATE 35: CPT | Performed by: INTERNAL MEDICINE

## 2021-01-06 PROCEDURE — 85027 COMPLETE CBC AUTOMATED: CPT

## 2021-01-06 PROCEDURE — 87075 CULTR BACTERIA EXCEPT BLOOD: CPT

## 2021-01-06 PROCEDURE — 87205 SMEAR GRAM STAIN: CPT

## 2021-01-06 PROCEDURE — 2500000003 HC RX 250 WO HCPCS: Performed by: PHYSICIAN ASSISTANT

## 2021-01-06 PROCEDURE — 87186 SC STD MICRODIL/AGAR DIL: CPT

## 2021-01-06 PROCEDURE — 87070 CULTURE OTHR SPECIMN AEROBIC: CPT

## 2021-01-06 PROCEDURE — 6370000000 HC RX 637 (ALT 250 FOR IP): Performed by: PHYSICIAN ASSISTANT

## 2021-01-06 PROCEDURE — 80053 COMPREHEN METABOLIC PANEL: CPT

## 2021-01-06 RX ORDER — EPINEPHRINE 1 MG/ML
0.3 INJECTION, SOLUTION, CONCENTRATE INTRAVENOUS PRN
Status: CANCELLED | OUTPATIENT
Start: 2021-01-06

## 2021-01-06 RX ORDER — DIPHENHYDRAMINE HYDROCHLORIDE 50 MG/ML
50 INJECTION INTRAMUSCULAR; INTRAVENOUS ONCE
Status: CANCELLED | OUTPATIENT
Start: 2021-01-06 | End: 2021-01-06

## 2021-01-06 RX ORDER — METHYLPREDNISOLONE SODIUM SUCCINATE 125 MG/2ML
125 INJECTION, POWDER, LYOPHILIZED, FOR SOLUTION INTRAMUSCULAR; INTRAVENOUS ONCE
Status: CANCELLED | OUTPATIENT
Start: 2021-01-06 | End: 2021-01-06

## 2021-01-06 RX ORDER — SODIUM CHLORIDE 0.9 % (FLUSH) 0.9 %
5 SYRINGE (ML) INJECTION PRN
Status: CANCELLED | OUTPATIENT
Start: 2021-01-06

## 2021-01-06 RX ORDER — SODIUM CHLORIDE 9 MG/ML
INJECTION, SOLUTION INTRAVENOUS CONTINUOUS
Status: CANCELLED | OUTPATIENT
Start: 2021-01-06

## 2021-01-06 RX ORDER — SODIUM CHLORIDE 0.9 % (FLUSH) 0.9 %
10 SYRINGE (ML) INJECTION PRN
Status: CANCELLED | OUTPATIENT
Start: 2021-01-06

## 2021-01-06 RX ADMIN — CLINDAMYCIN IN 5 PERCENT DEXTROSE 600 MG: 12 INJECTION, SOLUTION INTRAVENOUS at 20:17

## 2021-01-06 RX ADMIN — OXYCODONE HYDROCHLORIDE 10 MG: 5 TABLET ORAL at 17:21

## 2021-01-06 RX ADMIN — FAMOTIDINE 20 MG: 20 TABLET, FILM COATED ORAL at 08:13

## 2021-01-06 RX ADMIN — CLINDAMYCIN IN 5 PERCENT DEXTROSE 600 MG: 12 INJECTION, SOLUTION INTRAVENOUS at 03:52

## 2021-01-06 RX ADMIN — IBUPROFEN 400 MG: 400 TABLET ORAL at 10:20

## 2021-01-06 RX ADMIN — MORPHINE SULFATE 4 MG: 4 INJECTION INTRAVENOUS at 03:52

## 2021-01-06 RX ADMIN — CLINDAMYCIN IN 5 PERCENT DEXTROSE 600 MG: 12 INJECTION, SOLUTION INTRAVENOUS at 10:20

## 2021-01-06 RX ADMIN — OXYCODONE HYDROCHLORIDE 10 MG: 5 TABLET ORAL at 08:12

## 2021-01-06 RX ADMIN — OXYCODONE HYDROCHLORIDE 10 MG: 5 TABLET ORAL at 12:12

## 2021-01-06 RX ADMIN — SILVER SULFADIAZINE: 10 CREAM TOPICAL at 08:14

## 2021-01-06 RX ADMIN — FAMOTIDINE 20 MG: 20 TABLET, FILM COATED ORAL at 20:17

## 2021-01-06 ASSESSMENT — PAIN SCALES - GENERAL
PAINLEVEL_OUTOF10: 9
PAINLEVEL_OUTOF10: 10
PAINLEVEL_OUTOF10: 0

## 2021-01-06 NOTE — PROGRESS NOTES
Progress Note      Subjective:   Chief complaint:   RLE cellulitis    Interval History:   Patient seen and examined this morning. He reports improvement of the area of cellulitis. He also reports improvement of pain. Scab on lateral aspect of right lower extremity lifted gently by Dr. Vahid Kuo with removal of 10 cc of pus. Review of systems:   Constitutional:  Denies fever or chills. Eyes:  Denies change in visual acuity or discharge. HENT:  Denies nasal congestion or sore throat. Respiratory:  Denies cough or shortness of breath. Cardiovascular:  Denies chest pain, palpitation or swelling in LEs. GI:  Denies abdominal pain, nausea, vomiting, bloody stools or diarrhea. :  Denies dysuria or frequency. Musculoskeletal:  Denies back pain or joint pain. Integument:  Denies rash or itching. Positive for right lower extremity cellulitis and pain. Neurologic:  Denies headache, focal weakness or sensory changes. Endocrine:  Denies polyuria or polydipsia. Lymphatic:  Denies swollen glands or night sweats. Psychiatric:  Denies depression or anxiety. Past medical history, surgical history, family history and social history reviewed and unchanged compared to H&P earlier this admission. Medications:   Scheduled Meds:   silver sulfADIAZINE   Topical Daily    clindamycin (CLEOCIN) IV  600 mg Intravenous Q8H    enoxaparin  40 mg Subcutaneous Nightly    famotidine  20 mg Oral BID     Continuous Infusions:    Objective:     Vital Signs  Temp: 97.7 °F (36.5 °C)  Pulse: 88  Resp: 18  BP: 139/76  SpO2: 97 %  O2 Device: None (Room air)       Vital signs reviewed in electronic charts. Physical exam  Constitutional:  Well developed, well nourished, male lying in bed in no acute distress. Eyes:  PERRL, no scleral icterus, conjunctiva normal   HENT:  Atraumatic, external ears normal, nose normal, oropharynx moist, no pharyngeal exudates.  Neck- supple, no JVD, no lymphadenopathy  Respiratory:  No oxycodone; bowel regimen while receiving narcotics  -elevate RLE  -Continue IV Clindamycin  -ESR 8 on 1/4  -CRP 41.9 on 1/4   -blood cultures 1/4 with no growth  -topical silvadene        Patient was seen and examined by Dr. Grisel Whiting and plan of care reviewed.       Electronically signed by MADALYN Cary on 1/6/2021 at 1:29 PM

## 2021-01-06 NOTE — PLAN OF CARE
Problem: Infection:  Goal: Will remain free from infection  Description: Will remain free from infection  1/6/2021 0818 by Yahir Rodgers RN  Outcome: Ongoing  1/5/2021 2117 by Millie Yousif LPN  Outcome: Ongoing     Problem: Safety:  Goal: Free from accidental physical injury  Description: Free from accidental physical injury  1/6/2021 0818 by Yahir Rodgers RN  Outcome: Ongoing  1/5/2021 2117 by Millie Yousif LPN  Outcome: Ongoing  Goal: Free from intentional harm  Description: Free from intentional harm  1/5/2021 2117 by Millie Yousif LPN  Outcome: Ongoing     Problem: Daily Care:  Goal: Daily care needs are met  Description: Daily care needs are met  1/6/2021 0818 by Yahir Rodgers RN  Outcome: Ongoing  1/5/2021 2117 by Millie Yousif LPN  Outcome: Ongoing     Problem: Pain:  Goal: Pain level will decrease  Description: Pain level will decrease  Outcome: Ongoing

## 2021-01-06 NOTE — FLOWSHEET NOTE
01/06/21 0820   Assessment   Charting Type Shift assessment   Neurological   Neuro (WDL) WDL   Level of Consciousness Alert (0)   Ramírez Coma Scale   Eye Opening 4   Best Verbal Response 5   Best Motor Response 6   Ramírez Coma Scale Score 15   NIH/MNHISS Stroke Scale   NIH/MNIHSS Stroke Scale Assessed No   HEENT   HEENT (WDL) WDL   Respiratory   Respiratory (WDL) WDL   Cardiac   Cardiac (WDL) WDL   Gastrointestinal   Abdominal (WDL) WDL   Peripheral Vascular   Peripheral Vascular (WDL) WDL   Edema Right lower extremity   RLE Edema Non-pitting   Skin Color/Condition   Skin Color/Condition (WDL) WDL   Skin Integrity   Skin Integrity (WDL) X   Location rt leg   Skin Integrity Redness; Weeping   Musculoskeletal   Musculoskeletal (WDL) WDL   Genitourinary   Genitourinary (WDL) WDL   Flank Tenderness No   Suprapubic Tenderness No   Dysuria No   Wound 01/04/21 Leg Lower; Outer brown circular weeping with redness surrounding and swelling. Date First Assessed/Time First Assessed: 01/04/21 2135   Present on Hospital Admission: Yes  Wound Approximate Age at First Assessment (Weeks): 1 weeks  Primary Wound Type: Soft Tissue Necrosis  Location: Leg  Wound Location Orientation: Lower; Outer  . .. Dressing Status Other (Comment)  (AUTUMN)   Margins Attached edges   Psychosocial   Psychosocial (WDL) WDL   Pt awake in bed. Pt alert and oriented. Pt appears in no acute distress. Pt currently on RA. Pt lung sounds clear throughout. Pt encouraged to cough and deep breathe. Pt redness and swelling noted to RLE noted to be decreased. Pt states pain 9/10 at this time, see eMAR. Pt call bell and bedside table within reach. Will continue to monitor pt.

## 2021-01-07 ENCOUNTER — APPOINTMENT (OUTPATIENT)
Dept: ULTRASOUND IMAGING | Facility: HOSPITAL | Age: 51
DRG: 383 | End: 2021-01-07
Payer: COMMERCIAL

## 2021-01-07 VITALS
OXYGEN SATURATION: 97 % | HEIGHT: 72 IN | DIASTOLIC BLOOD PRESSURE: 83 MMHG | HEART RATE: 81 BPM | TEMPERATURE: 98.3 F | RESPIRATION RATE: 19 BRPM | BODY MASS INDEX: 33.32 KG/M2 | WEIGHT: 246 LBS | SYSTOLIC BLOOD PRESSURE: 131 MMHG

## 2021-01-07 LAB
ANION GAP SERPL CALCULATED.3IONS-SCNC: 10 MMOL/L (ref 3–16)
BUN BLDV-MCNC: 17 MG/DL (ref 6–20)
CALCIUM SERPL-MCNC: 8.8 MG/DL (ref 8.5–10.5)
CHLORIDE BLD-SCNC: 100 MMOL/L (ref 98–107)
CO2: 26 MMOL/L (ref 20–30)
CREAT SERPL-MCNC: 0.8 MG/DL (ref 0.4–1.2)
GFR AFRICAN AMERICAN: >59
GFR NON-AFRICAN AMERICAN: >59
GLUCOSE BLD-MCNC: 89 MG/DL (ref 74–106)
HCT VFR BLD CALC: 46.8 % (ref 40–54)
HEMOGLOBIN: 15.2 G/DL (ref 13–18)
MCH RBC QN AUTO: 31.8 PG (ref 27–32)
MCHC RBC AUTO-ENTMCNC: 32.5 G/DL (ref 31–35)
MCV RBC AUTO: 97.9 FL (ref 80–100)
PDW BLD-RTO: 12.6 % (ref 11–16)
PLATELET # BLD: 216 K/UL (ref 150–400)
PMV BLD AUTO: 10.2 FL (ref 6–10)
POTASSIUM SERPL-SCNC: 4.3 MMOL/L (ref 3.4–5.1)
RBC # BLD: 4.78 M/UL (ref 4.5–6)
SODIUM BLD-SCNC: 136 MMOL/L (ref 136–145)
WBC # BLD: 7.9 K/UL (ref 4–11)

## 2021-01-07 PROCEDURE — 36415 COLL VENOUS BLD VENIPUNCTURE: CPT

## 2021-01-07 PROCEDURE — 80048 BASIC METABOLIC PNL TOTAL CA: CPT

## 2021-01-07 PROCEDURE — 2500000003 HC RX 250 WO HCPCS: Performed by: PHYSICIAN ASSISTANT

## 2021-01-07 PROCEDURE — 85027 COMPLETE CBC AUTOMATED: CPT

## 2021-01-07 PROCEDURE — 93971 EXTREMITY STUDY: CPT

## 2021-01-07 PROCEDURE — 99238 HOSP IP/OBS DSCHRG MGMT 30/<: CPT | Performed by: INTERNAL MEDICINE

## 2021-01-07 PROCEDURE — 6370000000 HC RX 637 (ALT 250 FOR IP): Performed by: PHYSICIAN ASSISTANT

## 2021-01-07 RX ORDER — OXYCODONE HYDROCHLORIDE 10 MG/1
10 TABLET ORAL EVERY 6 HOURS PRN
Qty: 12 TABLET | Refills: 0 | Status: SHIPPED | OUTPATIENT
Start: 2021-01-07 | End: 2021-01-10

## 2021-01-07 RX ORDER — SULFAMETHOXAZOLE AND TRIMETHOPRIM 800; 160 MG/1; MG/1
1 TABLET ORAL 2 TIMES DAILY
Qty: 14 TABLET | Refills: 0 | Status: SHIPPED | OUTPATIENT
Start: 2021-01-07 | End: 2021-01-14

## 2021-01-07 RX ADMIN — OXYCODONE HYDROCHLORIDE 10 MG: 5 TABLET ORAL at 04:56

## 2021-01-07 RX ADMIN — CLINDAMYCIN IN 5 PERCENT DEXTROSE 600 MG: 12 INJECTION, SOLUTION INTRAVENOUS at 11:42

## 2021-01-07 RX ADMIN — CLINDAMYCIN IN 5 PERCENT DEXTROSE 600 MG: 12 INJECTION, SOLUTION INTRAVENOUS at 04:29

## 2021-01-07 RX ADMIN — FAMOTIDINE 20 MG: 20 TABLET, FILM COATED ORAL at 08:04

## 2021-01-07 RX ADMIN — IBUPROFEN 400 MG: 400 TABLET ORAL at 04:30

## 2021-01-07 RX ADMIN — OXYCODONE HYDROCHLORIDE 10 MG: 5 TABLET ORAL at 09:33

## 2021-01-07 RX ADMIN — OXYCODONE HYDROCHLORIDE 10 MG: 5 TABLET ORAL at 13:42

## 2021-01-07 RX ADMIN — SILVER SULFADIAZINE: 10 CREAM TOPICAL at 11:42

## 2021-01-07 RX ADMIN — OXYCODONE HYDROCHLORIDE 10 MG: 5 TABLET ORAL at 00:41

## 2021-01-07 ASSESSMENT — PAIN SCALES - GENERAL
PAINLEVEL_OUTOF10: 0
PAINLEVEL_OUTOF10: 8
PAINLEVEL_OUTOF10: 9
PAINLEVEL_OUTOF10: 9
PAINLEVEL_OUTOF10: 0
PAINLEVEL_OUTOF10: 8

## 2021-01-07 NOTE — CARE COORDINATION
Discharge planning discussed with pt today. Plan for pt to d/c home today with transportation from [facility]. Pt states his ambulation is better and does not want walker but would like a cane if possible. Sent DME referral to [facility] and Arianne Barnes stated that he was out of network and his insurance would only be accepted in [place] or [place]. Talked to pt to see if he could afford to purchase one and pt states that he has no money to purchase. Talked to unit manager Albertina Nathan and she stated we could donate one to pt. Quad cane was available in stock and given to pt to take at time of discharge.

## 2021-01-07 NOTE — CARE COORDINATION
CM spoke with cl and notified him that he will need to call [phone] [facility] office [facility] and notify them that he is now a [facility] resident.   Once he does, they can change him to [facility].

## 2021-01-07 NOTE — PROGRESS NOTES
Patient given verbal discharge orders. No questions or concerns r/t dc. Patient ambulatory. [facility] to transport patient home. Patient left unit to wait for transport at hospital entrance. No acute distress noted.

## 2021-01-07 NOTE — DISCHARGE SUMMARY
Discharge Summary      Patient ID: Bart Bhardwaj      Patient's PCP: No primary care provider on file. Admit Date: 1/4/2021     Discharge Date:  1/7/2021    Admitting Provider: Veronika De Santiago MD    Discharging Provider: MADALYN Muñoz     Reason for this admission:   Right leg cellulitis    Discharge Diagnoses: Active Hospital Problems    Diagnosis Date Noted    Hepatitis C [B19.20] 01/06/2021    Transaminitis [R74.01] 01/05/2021    Obesity (BMI 30.0-34. 9) [E66.9] 01/05/2021    Hypertension [I10] 01/05/2021    Tobacco abuse [Z72.0] 01/05/2021    Cellulitis [L03.90] 01/04/2021       Procedures:  US DUP LOWER EXTREMITY RIGHT FRANCY   Final Result      No evidence of DVT in the visualized portions of the deep venous system of the right lower extremity. CT TIBIA FIBULA RIGHT W CONTRAST   Final Result   1. Subcutis stranding, edema with skin thickening, cellulitis. 2. No evidence of abscess, effusion or popliteal fossa fluid            Consults:   IP CONSULT TO CASE MANAGEMENT      Briefly:   76-year-old male with past medical history of hepatitis C, tobacco abuse, hypertension and recurrent MRSA requiring I&D's admitted for right lower extremity cellulitis. Hospital Course: Active Hospital Problems    Diagnosis Date Noted    Hepatitis C [B19.20]  -Chronic  -Hepatitis C RNA ordered  -Refer to Melina Lainez, Pharm. D. clinic on discharge   01/06/2021    Transaminitis [R74.01]  -Due to chronic hepatitis C   01/05/2021    Obesity (BMI 30.0-34. 9) [E66.9]  -BMI 33.36  -Counseled on diet and exercise after recovery   01/05/2021    Hypertension [I10]  -not currently on any BP meds  -BP currently controlled   01/05/2021    Tobacco abuse [Z72.0]  -offered NRT  -Counseled on cessation   01/05/2021    Cellulitis [L03.90]  -of RLL in area of old RLE tattoo  -hx of MRSA requiring multiple I&D's in the past  -RLE CT 1/5 showed cellulitis, no evidence of abscess  -received PRN IV morphine and PRN oxycodone while admitted  -s/p bedside I&D on 1/6  -Cellulitis improving. Try to arrange outpatient infusion of Ryder Payne however patient has [place] and this was not approved. He has been encouraged to apply for [facility]. Treated with IV clindamycin while inpatient. Will transition to Bactrim on discharge. Patient also will be given a prescription for Silvadene and as needed oxycodone. Pharmacy will be instructed to only fill oxycodone if patient feels the antibiotic. 01/04/2021       Disposition: home    Discharged Condition: Stable    Vital Signs  Temp: 98.3 °F (36.8 °C)  Pulse: 81  Resp: 19  BP: 131/83  SpO2: 97 %  O2 Device: None (Room air)       Vital signs reviewed in electronic chart. Physical exam  Constitutional:  Well developed, well nourished, male lying in bed in no acute distress.    Eyes:  PERRL, no scleral icterus, conjunctiva normal   HENT:  Atraumatic, external ears normal, nose normal, oropharynx moist, no pharyngeal exudates. Neck- supple, no JVD, no lymphadenopathy  Respiratory:  No respiratory distress, no wheezing, rales or rhonchi detected  Cardiovascular:  Normal rate, normal rhythm, no murmurs, no gallops, no rubs, no edema   GI:  Soft, nondistended, normal bowel sounds, nontender, no voluntary guarding  Musculoskeletal:  No cyanosis or obvious acute deformity. Moving all extremities   Integument:  Warm and dry.  RLL tattoo present in area of cellulitis. Right lower extremity cellulitis extending from below the right knee to above the right ankle circumferentially; improved since admission. Appoximate 2 cm scab laterally on RLE; lifted with removal of 10 cc pus.   Lymphatic:  No cervical or axillary lymphadenopathy noted   Neurologic:  Alert & oriented x 3, no apparent focal deficits noted   Psychiatric:  Speech and behavior appropriate       Activity: activity as tolerated  Diet: regular diet  Follow Up: Primary Care Physician in 2 week and with [facility] as scheduled    Labs: For convenience and continuity at follow-up the following most recent labs are provided:    CBC:   Lab Results   Component Value Date    WBC 7.9 01/07/2021    HGB 15.2 01/07/2021    HCT 46.8 01/07/2021     01/07/2021       RENAL:   Lab Results   Component Value Date     01/07/2021    K 4.3 01/07/2021    K 4.3 01/06/2021     01/07/2021    CO2 26 01/07/2021    BUN 17 01/07/2021    CREATININE 0.8 01/07/2021         Discharge Medications:     Current Discharge Medication List           Details   silver sulfADIAZINE (SILVADENE) 1 % cream Apply topically daily. Qty: 50 g, Refills: 0      sulfamethoxazole-trimethoprim (BACTRIM DS;SEPTRA DS) 800-160 MG per tablet Take 1 tablet by mouth 2 times daily for 7 days  Qty: 14 tablet, Refills: 0      oxyCODONE (OXY-IR) 10 MG immediate release tablet Take 1 tablet by mouth every 6 hours as needed for Pain for up to 3 days. Qty: 12 tablet, Refills: 0    Comments: Reduce doses taken as pain becomes manageable  Associated Diagnoses: Cellulitis of right lower extremity              Patient was seen and examined by Dr. Chely Negron and plan of care reviewed. Signed:  Electronically signed by MADALYN Jalloh on 1/7/2021 at 12:57 PM       Thank you No primary care provider on file. for the opportunity to be involved in this patient's care. If you have any questions or concerns please feel free to contact me at [phone].

## 2021-01-07 NOTE — PROGRESS NOTES
Tried to speak with pt about establishing a new pcp due to the fact he has just moved here from [address] [place] couldn't provide me with any type of insurance    Tried to speak to pt about [address] [place] [address] [address] [place] today   Pt could not provide me with a physical address   The Emergency contact number on chart is not a valid number    pts phone number on chart is also a non working number

## 2021-01-07 NOTE — FLOWSHEET NOTE
01/06/21 2000   Assessment   Charting Type Shift assessment   Neurological   Neuro (WDL) WDL   Level of Consciousness Alert (0)   Ramírez Coma Scale   Eye Opening 4   Best Verbal Response 5   Best Motor Response 6   Ramírez Coma Scale Score 15   HEENT   HEENT (WDL) WDL   Respiratory   Respiratory (WDL) WDL   Cardiac   Cardiac (WDL) WDL   Gastrointestinal   Abdominal (WDL) WDL   Peripheral Vascular   Peripheral Vascular (WDL) WDL   Edema Right lower extremity   RLE Edema Non-pitting   Skin Color/Condition   Skin Color/Condition (WDL) WDL   Skin Integrity   Skin Integrity (WDL) X   Location Rt Leg   Skin Integrity Weeping;Redness   Musculoskeletal   Musculoskeletal (WDL) WDL   Genitourinary   Genitourinary (WDL) WDL   Flank Tenderness No   Suprapubic Tenderness No   Dysuria No   Wound 01/04/21 Leg Lower; Outer brown circular weeping with redness surrounding and swelling. Date First Assessed/Time First Assessed: 01/04/21 2135   Present on Hospital Admission: Yes  Wound Approximate Age at First Assessment (Weeks): 1 weeks  Primary Wound Type: Soft Tissue Necrosis  Location: Leg  Wound Location Orientation: Lower; Outer  . ..    Dressing Status Other (Comment)  (AUTUMN)   Margins Attached edges   Psychosocial   Psychosocial (WDL) WDL

## 2021-01-08 NOTE — PROGRESS NOTES
Talked with the pt about his hep c diagnosis and potential treatment. Pt stated that he was interested in hep c treatment. Provided pt with my business card and I wrote down the pt's phone number. I will reach out to the pt in a few weeks to schedule appointment to see if he is a candidate for hep c treatment. Of note, hep c diagnosis was historical, hep c rna in progress.     Melina Lainez

## 2021-01-09 LAB
BLOOD CULTURE, ROUTINE: NORMAL
CULTURE, BLOOD 2: NORMAL
HCV QNT BY NAAT IU/ML: ABNORMAL
HCV QNT BY NAAT LOG IU/ML: 5.88 LOG IU/ML
INTERPRETATION: DETECTED

## 2021-01-11 LAB
ANAEROBIC CULTURE: ABNORMAL
GRAM STAIN RESULT: ABNORMAL
ORGANISM: ABNORMAL
WOUND/ABSCESS: ABNORMAL

## 2021-02-01 DIAGNOSIS — B19.20 HEPATITIS C VIRUS INFECTION WITHOUT HEPATIC COMA, UNSPECIFIED CHRONICITY: Primary | ICD-10-CM

## 2021-03-19 ENCOUNTER — TELEPHONE (OUTPATIENT)
Dept: PHARMACY | Facility: HOSPITAL | Age: 51
End: 2021-03-19

## 2021-03-19 NOTE — TELEPHONE ENCOUNTER
Pt missed his follow-up appointment with our hepatitis c clinic on 3/1/21. I have tried called the pt several times and have been unable to reach him. Called number again on his profile today ([phone]) and the person that answered said that I had the wrong number. Called the pt's emergency contact and it was an invalid number. Thus, not able to follow-up with pt due to invalid numbers provided by pt.     Carmella James, MarkD

## 2021-08-05 ENCOUNTER — APPOINTMENT (OUTPATIENT)
Dept: GENERAL RADIOLOGY | Facility: HOSPITAL | Age: 51
End: 2021-08-05

## 2021-08-05 ENCOUNTER — HOSPITAL ENCOUNTER (EMERGENCY)
Facility: HOSPITAL | Age: 51
Discharge: HOME | End: 2021-08-06
Attending: FAMILY MEDICINE | Admitting: FAMILY MEDICINE

## 2021-08-05 DIAGNOSIS — L03.113 CELLULITIS OF RIGHT HAND: Primary | ICD-10-CM

## 2021-08-05 LAB
BASOPHILS # BLD AUTO: 0.07 10*3/MM3 (ref 0–0.2)
BASOPHILS NFR BLD AUTO: 0.8 % (ref 0–1.5)
DEPRECATED RDW RBC AUTO: 45.1 FL (ref 37–54)
EOSINOPHIL # BLD AUTO: 0.57 10*3/MM3 (ref 0–0.4)
EOSINOPHIL NFR BLD AUTO: 6.2 % (ref 0.3–6.2)
ERYTHROCYTE [DISTWIDTH] IN BLOOD BY AUTOMATED COUNT: 12.9 % (ref 12.3–15.4)
HCT VFR BLD AUTO: 44.2 % (ref 37.5–51)
HGB BLD-MCNC: 15.5 G/DL (ref 13–17.7)
IMM GRANULOCYTES # BLD AUTO: 0.04 10*3/MM3 (ref 0–0.05)
IMM GRANULOCYTES NFR BLD AUTO: 0.4 % (ref 0–0.5)
LYMPHOCYTES # BLD AUTO: 2.16 10*3/MM3 (ref 0.7–3.1)
LYMPHOCYTES NFR BLD AUTO: 23.4 % (ref 19.6–45.3)
MCH RBC QN AUTO: 33.4 PG (ref 26.6–33)
MCHC RBC AUTO-ENTMCNC: 35.1 G/DL (ref 31.5–35.7)
MCV RBC AUTO: 95.3 FL (ref 79–97)
MONOCYTES # BLD AUTO: 0.92 10*3/MM3 (ref 0.1–0.9)
MONOCYTES NFR BLD AUTO: 10 % (ref 5–12)
NEUTROPHILS NFR BLD AUTO: 5.48 10*3/MM3 (ref 1.7–7)
NEUTROPHILS NFR BLD AUTO: 59.2 % (ref 42.7–76)
NRBC BLD AUTO-RTO: 0 /100 WBC (ref 0–0.2)
PLATELET # BLD AUTO: 173 10*3/MM3 (ref 140–450)
PMV BLD AUTO: 9.7 FL (ref 6–12)
RBC # BLD AUTO: 4.64 10*6/MM3 (ref 4.14–5.8)
WBC # BLD AUTO: 9.24 10*3/MM3 (ref 3.4–10.8)

## 2021-08-05 PROCEDURE — 86140 C-REACTIVE PROTEIN: CPT | Performed by: FAMILY MEDICINE

## 2021-08-05 PROCEDURE — 96374 THER/PROPH/DIAG INJ IV PUSH: CPT

## 2021-08-05 PROCEDURE — 25010000002 MORPHINE PER 10 MG: Performed by: FAMILY MEDICINE

## 2021-08-05 PROCEDURE — 85025 COMPLETE CBC W/AUTO DIFF WBC: CPT | Performed by: FAMILY MEDICINE

## 2021-08-05 PROCEDURE — 80053 COMPREHEN METABOLIC PANEL: CPT | Performed by: FAMILY MEDICINE

## 2021-08-05 PROCEDURE — 73130 X-RAY EXAM OF HAND: CPT

## 2021-08-05 PROCEDURE — 99284 EMERGENCY DEPT VISIT MOD MDM: CPT

## 2021-08-05 RX ORDER — SODIUM CHLORIDE 0.9 % (FLUSH) 0.9 %
10 SYRINGE (ML) INJECTION AS NEEDED
Status: DISCONTINUED | OUTPATIENT
Start: 2021-08-05 | End: 2021-08-06 | Stop reason: HOSPADM

## 2021-08-05 RX ORDER — MORPHINE SULFATE 4 MG/ML
4 INJECTION, SOLUTION INTRAMUSCULAR; INTRAVENOUS ONCE
Status: COMPLETED | OUTPATIENT
Start: 2021-08-05 | End: 2021-08-05

## 2021-08-05 RX ADMIN — MORPHINE SULFATE 4 MG: 4 INJECTION, SOLUTION INTRAMUSCULAR; INTRAVENOUS at 23:43

## 2021-08-06 VITALS
HEIGHT: 72 IN | WEIGHT: 250 LBS | DIASTOLIC BLOOD PRESSURE: 96 MMHG | BODY MASS INDEX: 33.86 KG/M2 | SYSTOLIC BLOOD PRESSURE: 175 MMHG | OXYGEN SATURATION: 97 % | TEMPERATURE: 97.8 F | HEART RATE: 95 BPM | RESPIRATION RATE: 16 BRPM

## 2021-08-06 LAB
ALBUMIN SERPL-MCNC: 3.7 G/DL (ref 3.5–5.2)
ALBUMIN/GLOB SERPL: 1.1 G/DL
ALP SERPL-CCNC: 50 U/L (ref 39–117)
ALT SERPL W P-5'-P-CCNC: 78 U/L (ref 1–41)
ANION GAP SERPL CALCULATED.3IONS-SCNC: 11 MMOL/L (ref 5–15)
AST SERPL-CCNC: 39 U/L (ref 1–40)
BILIRUB SERPL-MCNC: 0.3 MG/DL (ref 0–1.2)
BUN SERPL-MCNC: 13 MG/DL (ref 6–20)
BUN/CREAT SERPL: 19.7 (ref 7–25)
CALCIUM SPEC-SCNC: 9 MG/DL (ref 8.6–10.5)
CHLORIDE SERPL-SCNC: 106 MMOL/L (ref 98–107)
CO2 SERPL-SCNC: 23 MMOL/L (ref 22–29)
CREAT SERPL-MCNC: 0.66 MG/DL (ref 0.76–1.27)
CRP SERPL-MCNC: 0.78 MG/DL (ref 0–0.5)
GFR SERPL CREATININE-BSD FRML MDRD: 128 ML/MIN/1.73
GLOBULIN UR ELPH-MCNC: 3.4 GM/DL
GLUCOSE SERPL-MCNC: 174 MG/DL (ref 65–99)
POTASSIUM SERPL-SCNC: 4 MMOL/L (ref 3.5–5.2)
PROT SERPL-MCNC: 7.1 G/DL (ref 6–8.5)
SODIUM SERPL-SCNC: 140 MMOL/L (ref 136–145)

## 2021-08-06 PROCEDURE — 25010000002 HYDROMORPHONE 1 MG/ML SOLUTION: Performed by: FAMILY MEDICINE

## 2021-08-06 PROCEDURE — 96375 TX/PRO/DX INJ NEW DRUG ADDON: CPT

## 2021-08-06 PROCEDURE — 25010000002 FENTANYL CITRATE (PF) 50 MCG/ML SOLUTION: Performed by: FAMILY MEDICINE

## 2021-08-06 RX ORDER — CEFADROXIL 500 MG/1
500 CAPSULE ORAL 2 TIMES DAILY
Qty: 14 CAPSULE | Refills: 0 | Status: SHIPPED | OUTPATIENT
Start: 2021-08-06 | End: 2021-08-13

## 2021-08-06 RX ORDER — FENTANYL CITRATE 50 UG/ML
75 INJECTION, SOLUTION INTRAMUSCULAR; INTRAVENOUS ONCE
Status: COMPLETED | OUTPATIENT
Start: 2021-08-06 | End: 2021-08-06

## 2021-08-06 RX ORDER — CLINDAMYCIN HYDROCHLORIDE 150 MG/1
450 CAPSULE ORAL 3 TIMES DAILY
Qty: 63 CAPSULE | Refills: 0 | Status: SHIPPED | OUTPATIENT
Start: 2021-08-06 | End: 2021-08-13

## 2021-08-06 RX ORDER — HYDROCODONE BITARTRATE AND ACETAMINOPHEN 5; 325 MG/1; MG/1
1 TABLET ORAL EVERY 6 HOURS PRN
Qty: 12 TABLET | Refills: 0 | Status: SHIPPED | OUTPATIENT
Start: 2021-08-06

## 2021-08-06 RX ORDER — CLINDAMYCIN HYDROCHLORIDE 150 MG/1
600 CAPSULE ORAL ONCE
Status: COMPLETED | OUTPATIENT
Start: 2021-08-06 | End: 2021-08-06

## 2021-08-06 RX ADMIN — HYDROMORPHONE HYDROCHLORIDE 1 MG: 1 INJECTION, SOLUTION INTRAMUSCULAR; INTRAVENOUS; SUBCUTANEOUS at 01:21

## 2021-08-06 RX ADMIN — FENTANYL CITRATE 75 MCG: 50 INJECTION, SOLUTION INTRAMUSCULAR; INTRAVENOUS at 00:19

## 2021-08-06 RX ADMIN — CLINDAMYCIN HYDROCHLORIDE 600 MG: 150 CAPSULE ORAL at 00:23

## 2021-08-06 NOTE — ED PROVIDER NOTES
Subjective   Chief Complaint: Right hand pain  History of Present Illness: 51-year-old male comes in for evaluation of above complaint.  He states about a month ago he punched a punching bag and accidentally struck one of the metal supports injuring his dorsal third MCP joint.  He states no significant issues until the past few days he started noticing swelling and pain to the right hand diffusely and a wound with some drainage over the dorsal third MCP joint.  No fever chills.  He states he has decreased range of motion secondary to pain.  No history of diabetes.  No recent antibiotics.  Onset: 3 weeks ago worsening the past few days  Exacerbating / Alleviating factors: Attempted movement of the hand and flexion extension of the fingers make her symptoms worse and palpation of the dorsal aspect of the third metacarpal  Associated symptoms: No fever chills      Nurses Notes reviewed and agree, including vitals, allergies, social history and prior medical history.          Review of Systems   Constitutional: Negative.    HENT: Negative.    Eyes: Negative.    Respiratory: Negative.    Cardiovascular: Negative.    Gastrointestinal: Negative.    Genitourinary: Negative.    Musculoskeletal:        Right hand pain   Skin: Negative.    Allergic/Immunologic: Negative.    Neurological: Negative.    Psychiatric/Behavioral: Negative.    All other systems reviewed and are negative.      Past Medical History:   Diagnosis Date   • Back injury    • Hypertension        No Known Allergies    Past Surgical History:   Procedure Laterality Date   • BACK SURGERY  2017       History reviewed. No pertinent family history.    Social History     Socioeconomic History   • Marital status:      Spouse name: Not on file   • Number of children: Not on file   • Years of education: Not on file   • Highest education level: Not on file   Tobacco Use   • Smoking status: Current Every Day Smoker     Packs/day: 0.50   Substance and Sexual  Activity   • Alcohol use: Not Currently   • Drug use: Yes     Types: Marijuana   • Sexual activity: Defer           Objective   Physical Exam  Vitals and nursing note reviewed.   Constitutional:       General: He is not in acute distress.     Appearance: Normal appearance. He is not ill-appearing, toxic-appearing or diaphoretic.   HENT:      Head: Normocephalic and atraumatic.      Nose: Nose normal.   Eyes:      Extraocular Movements: Extraocular movements intact.   Cardiovascular:      Rate and Rhythm: Normal rate and regular rhythm.      Pulses: Normal pulses.   Pulmonary:      Effort: Pulmonary effort is normal.   Musculoskeletal:      Cervical back: Normal range of motion.      Comments: Examination of the right hand secondary to pain   Skin:     Comments: No cellulitis or discrete abscess noted to the right hand or wrist.  There is some cracking of the skin over the dorsal third MCP joint with a small amount of serous drainage.   Neurological:      General: No focal deficit present.      Mental Status: He is alert.   Psychiatric:         Mood and Affect: Mood normal.         Behavior: Behavior normal.         Procedures           ED Course  ED Course as of Aug 06 0155   Fri Aug 06, 2021   0005 WBC: 9.24 [TM]      ED Course User Index  [TM] Dimitrios Cedeno PA-C                                           [facility]  0044  Examined with Dr. Paniagua at bedside, no discrete abscess, normal palmar creases, there is some edema to the dorsum of the right hand.  No overlying cellulitis.  Brisk capillary refill.  [facility] hand service for consult.    Discussed with Dr. Sanford  Ortho on call for hand and recommended Duracef 500gm BID and took the patients information two phone numbers one in the chart and a second the patient gave me was a friend named Debra.  Both of these numbers were given.  Dr. Sanford    After further discussion with the patient he states he is not quite sure if the home  number listed in his chart is working on getting another number of his friend William Wyman [phone] which was placed in the chart under the mobile number and this will be faxed to [facility] to ensure that they have several phone numbers that I can attempt to contact this patient. PT was given clindamycin here, no duricef available in the formulary.     Face sheet faxed to  for followup by Germán Lozano at this time [id number]  Final diagnoses:   Cellulitis of right hand       ED Disposition  ED Disposition     ED Disposition Condition Comment    Discharge Stable           No follow-up provider specified.       Medication List      New Prescriptions    cefadroxil 500 MG capsule  Commonly known as: DURICEF  Take 1 capsule by mouth 2 (Two) Times a Day for 7 days.     HYDROcodone-acetaminophen 5-325 MG per tablet  Commonly known as: NORCO  Take 1 tablet by mouth Every 6 (Six) Hours As Needed for Moderate Pain .        Changed    * clindamycin 300 MG capsule  Commonly known as: CLEOCIN  Take 1 capsule by mouth 3 (Three) Times a Day.  What changed: Another medication with the same name was added. Make sure you understand how and when to take each.     * clindamycin 150 MG capsule  Commonly known as: CLEOCIN  Take 3 capsules by mouth 3 (Three) Times a Day for 7 days.  What changed: You were already taking a medication with the same name, and this prescription was added. Make sure you understand how and when to take each.         * This list has 2 medication(s) that are the same as other medications prescribed for you. Read the directions carefully, and ask your doctor or other care provider to review them with you.               Where to Get Your Medications      These medications were sent to [facility]/pharmacy #[id number] - [place], [place] - [address] - [phone]  - [phone]   [address] [address]    Phone: [phone]   · cefadroxil 500 MG capsule  · clindamycin 150 MG  capsule  · HYDROcodone-acetaminophen 5-325 MG per tablet          Dimitrios Cedeno PA-C  08/06/21 0155

## 2021-08-06 NOTE — DISCHARGE INSTRUCTIONS
We gave information to the emergency contact and they should be calling you to set up an appointment for the next available appointment time.  We have given them Debra's, William's and the phone number you had listed in the computer.  I have also provided you with contact information for the [facility] to call tomorrow to ensure that you get prompt follow-up.

## 2021-08-06 NOTE — ED NOTES
[facility] called per CHHAYA Plunkett requesting Hand for a consult.      Shreyas Browning  08/06/21 0052

## 2023-10-05 ENCOUNTER — HOSPITAL ENCOUNTER (INPATIENT)
Facility: HOSPITAL | Age: 53
LOS: 1 days | Discharge: HOME | DRG: 638 | End: 2023-10-07
Attending: EMERGENCY MEDICINE | Admitting: INTERNAL MEDICINE
Payer: MEDICAID

## 2023-10-05 DIAGNOSIS — E78.1 HYPERTRIGLYCERIDEMIA: ICD-10-CM

## 2023-10-05 DIAGNOSIS — E11.9 TYPE 2 DIABETES MELLITUS WITHOUT COMPLICATION, WITHOUT LONG-TERM CURRENT USE OF INSULIN: Primary | ICD-10-CM

## 2023-10-05 LAB
ALBUMIN SERPL-MCNC: 3.3 G/DL (ref 3.5–5.2)
ALBUMIN/GLOB SERPL: 0.8 G/DL
ALP SERPL-CCNC: 83 U/L (ref 39–117)
ALT SERPL W P-5'-P-CCNC: 117 U/L (ref 1–41)
ANION GAP SERPL CALCULATED.3IONS-SCNC: 21.1 MMOL/L (ref 5–15)
AST SERPL-CCNC: 107 U/L (ref 1–40)
ATMOSPHERIC PRESS: 734 MMHG
BASE EXCESS BLDV CALC-SCNC: 4.2 MMOL/L (ref 0–2)
BDY SITE: ABNORMAL
BILIRUB SERPL-MCNC: 0.5 MG/DL (ref 0–1.2)
BILIRUB UR QL STRIP: NEGATIVE
BUN SERPL-MCNC: 24 MG/DL (ref 6–20)
BUN/CREAT SERPL: 19.5 (ref 7–25)
CALCIUM SPEC-SCNC: 9.9 MG/DL (ref 8.6–10.5)
CHLORIDE SERPL-SCNC: 83 MMOL/L (ref 98–107)
CHOLEST SERPL-MCNC: 553 MG/DL (ref 0–200)
CLARITY UR: CLEAR
CO2 SERPL-SCNC: 13.9 MMOL/L (ref 22–29)
COHGB MFR BLD: 3.9 % (ref 0–5)
COLOR UR: YELLOW
CREAT SERPL-MCNC: 1.23 MG/DL (ref 0.76–1.27)
EGFRCR SERPLBLD CKD-EPI 2021: 70.6 ML/MIN/1.73
GLOBULIN UR ELPH-MCNC: 4.3 GM/DL
GLUCOSE BLDC GLUCOMTR-MCNC: 426 MG/DL (ref 70–130)
GLUCOSE BLDC GLUCOMTR-MCNC: 585 MG/DL (ref 70–130)
GLUCOSE SERPL-MCNC: 674 MG/DL (ref 65–99)
GLUCOSE UR STRIP-MCNC: ABNORMAL MG/DL
HBA1C MFR BLD: 10.2 % (ref 4.8–5.6)
HCO3 BLDV-SCNC: 30.7 MMOL/L (ref 22–28)
HDLC SERPL-MCNC: 9 MG/DL (ref 40–60)
HGB UR QL STRIP.AUTO: NEGATIVE
HOLD SPECIMEN: NORMAL
HOLD SPECIMEN: NORMAL
INHALED O2 CONCENTRATION: 21 %
KETONES UR QL STRIP: NEGATIVE
LDLC SERPL CALC-MCNC: ABNORMAL MG/DL
LDLC/HDLC SERPL: ABNORMAL {RATIO}
LEUKOCYTE ESTERASE UR QL STRIP.AUTO: NEGATIVE
Lab: ABNORMAL
Lab: ABNORMAL
METHGB BLD QL: 1.6 % (ref 0–3)
MODALITY: ABNORMAL
NITRITE UR QL STRIP: NEGATIVE
NOTIFIED BY: ABNORMAL
NOTIFIED WHO: ABNORMAL
OXYHGB MFR BLDV: 33.5 % (ref 40–70)
PCO2 BLDV: 51.2 MM HG (ref 40–50)
PH BLDV: 7.39 PH UNITS (ref 7.32–7.42)
PH UR STRIP.AUTO: 5.5 [PH] (ref 5–8)
PO2 BLDV: 17.3 MM HG (ref 30–50)
POTASSIUM SERPL-SCNC: 6 MMOL/L (ref 3.5–5.2)
PROT SERPL-MCNC: 7.6 G/DL (ref 6–8.5)
PROT UR QL STRIP: NEGATIVE
SAO2 % BLDCOV: 35.4 % (ref 45–75)
SODIUM SERPL-SCNC: 118 MMOL/L (ref 136–145)
SP GR UR STRIP: >=1.03 (ref 1–1.03)
TRIGL SERPL-MCNC: >4425 MG/DL (ref 0–150)
UROBILINOGEN UR QL STRIP: ABNORMAL
VENTILATOR MODE: ABNORMAL
VLDLC SERPL-MCNC: ABNORMAL MG/DL
WHOLE BLOOD HOLD COAG: NORMAL

## 2023-10-05 PROCEDURE — 82820 HEMOGLOBIN-OXYGEN AFFINITY: CPT

## 2023-10-05 PROCEDURE — 83036 HEMOGLOBIN GLYCOSYLATED A1C: CPT | Performed by: EMERGENCY MEDICINE

## 2023-10-05 PROCEDURE — 82948 REAGENT STRIP/BLOOD GLUCOSE: CPT

## 2023-10-05 PROCEDURE — 99285 EMERGENCY DEPT VISIT HI MDM: CPT

## 2023-10-05 PROCEDURE — 81003 URINALYSIS AUTO W/O SCOPE: CPT

## 2023-10-05 PROCEDURE — 25810000003 SODIUM CHLORIDE 0.9 % SOLUTION: Performed by: EMERGENCY MEDICINE

## 2023-10-05 PROCEDURE — 80061 LIPID PANEL: CPT | Performed by: EMERGENCY MEDICINE

## 2023-10-05 PROCEDURE — 83721 ASSAY OF BLOOD LIPOPROTEIN: CPT | Performed by: EMERGENCY MEDICINE

## 2023-10-05 PROCEDURE — 82805 BLOOD GASES W/O2 SATURATION: CPT

## 2023-10-05 PROCEDURE — 80053 COMPREHEN METABOLIC PANEL: CPT

## 2023-10-05 PROCEDURE — 25010000002 ONDANSETRON PER 1 MG: Performed by: EMERGENCY MEDICINE

## 2023-10-05 PROCEDURE — 63710000001 INSULIN REGULAR HUMAN PER 5 UNITS: Performed by: EMERGENCY MEDICINE

## 2023-10-05 PROCEDURE — 93005 ELECTROCARDIOGRAM TRACING: CPT | Performed by: EMERGENCY MEDICINE

## 2023-10-05 RX ORDER — ACETAMINOPHEN 325 MG/1
975 TABLET ORAL ONCE
Status: COMPLETED | OUTPATIENT
Start: 2023-10-05 | End: 2023-10-05

## 2023-10-05 RX ORDER — ONDANSETRON 2 MG/ML
4 INJECTION INTRAMUSCULAR; INTRAVENOUS ONCE
Status: COMPLETED | OUTPATIENT
Start: 2023-10-05 | End: 2023-10-05

## 2023-10-05 RX ORDER — SODIUM CHLORIDE 0.9 % (FLUSH) 0.9 %
10 SYRINGE (ML) INJECTION AS NEEDED
Status: DISCONTINUED | OUTPATIENT
Start: 2023-10-05 | End: 2023-10-07 | Stop reason: HOSPADM

## 2023-10-05 RX ADMIN — SODIUM CHLORIDE 2000 ML: 9 INJECTION, SOLUTION INTRAVENOUS at 21:37

## 2023-10-05 RX ADMIN — HUMAN INSULIN 10 UNITS: 100 INJECTION, SOLUTION SUBCUTANEOUS at 22:30

## 2023-10-05 RX ADMIN — ONDANSETRON 4 MG: 2 INJECTION INTRAMUSCULAR; INTRAVENOUS at 21:38

## 2023-10-05 RX ADMIN — HUMAN INSULIN 10 UNITS: 100 INJECTION, SOLUTION SUBCUTANEOUS at 23:28

## 2023-10-05 RX ADMIN — ACETAMINOPHEN 975 MG: 325 TABLET, FILM COATED ORAL at 23:28

## 2023-10-06 ENCOUNTER — APPOINTMENT (OUTPATIENT)
Dept: GENERAL RADIOLOGY | Facility: HOSPITAL | Age: 53
DRG: 638 | End: 2023-10-06
Payer: MEDICAID

## 2023-10-06 PROBLEM — E11.9 NEW ONSET TYPE 2 DIABETES MELLITUS: Status: ACTIVE | Noted: 2023-10-06

## 2023-10-06 LAB
ALBUMIN SERPL-MCNC: 2.8 G/DL (ref 3.5–5.2)
ALBUMIN SERPL-MCNC: 2.9 G/DL (ref 3.5–5.2)
ALBUMIN/GLOB SERPL: 0.8 G/DL
ALBUMIN/GLOB SERPL: 0.8 G/DL
ALP SERPL-CCNC: 57 U/L (ref 39–117)
ALP SERPL-CCNC: 59 U/L (ref 39–117)
ALT SERPL W P-5'-P-CCNC: 5 U/L (ref 1–41)
ALT SERPL W P-5'-P-CCNC: 93 U/L (ref 1–41)
ANION GAP SERPL CALCULATED.3IONS-SCNC: 17.6 MMOL/L (ref 5–15)
ANION GAP SERPL CALCULATED.3IONS-SCNC: 21 MMOL/L (ref 5–15)
ANION GAP SERPL CALCULATED.3IONS-SCNC: 23.1 MMOL/L (ref 5–15)
ANION GAP SERPL CALCULATED.3IONS-SCNC: 23.6 MMOL/L (ref 5–15)
ANION GAP SERPL CALCULATED.3IONS-SCNC: 24.6 MMOL/L (ref 5–15)
ANION GAP SERPL CALCULATED.3IONS-SCNC: 25.8 MMOL/L (ref 5–15)
ARTICHOKE IGE QN: 17 MG/DL (ref 0–100)
AST SERPL-CCNC: 5 U/L (ref 1–40)
AST SERPL-CCNC: 88 U/L (ref 1–40)
BASOPHILS # BLD AUTO: 0.06 10*3/MM3 (ref 0–0.2)
BASOPHILS # BLD AUTO: 0.07 10*3/MM3 (ref 0–0.2)
BASOPHILS # BLD AUTO: 0.08 10*3/MM3 (ref 0–0.2)
BASOPHILS NFR BLD AUTO: 1 % (ref 0–1.5)
BASOPHILS NFR BLD AUTO: 1.2 % (ref 0–1.5)
BASOPHILS NFR BLD AUTO: 1.2 % (ref 0–1.5)
BILIRUB SERPL-MCNC: 0.4 MG/DL (ref 0–1.2)
BILIRUB SERPL-MCNC: <0.2 MG/DL (ref 0–1.2)
BUN SERPL-MCNC: 13 MG/DL (ref 6–20)
BUN SERPL-MCNC: 15 MG/DL (ref 6–20)
BUN SERPL-MCNC: 17 MG/DL (ref 6–20)
BUN SERPL-MCNC: 17 MG/DL (ref 6–20)
BUN SERPL-MCNC: 18 MG/DL (ref 6–20)
BUN SERPL-MCNC: 20 MG/DL (ref 6–20)
BUN/CREAT SERPL: 23.6 (ref 7–25)
BUN/CREAT SERPL: 24 (ref 7–25)
BUN/CREAT SERPL: 24.6 (ref 7–25)
BUN/CREAT SERPL: 25.4 (ref 7–25)
BUN/CREAT SERPL: 25.4 (ref 7–25)
BUN/CREAT SERPL: 31.7 (ref 7–25)
CALCIUM SPEC-SCNC: 7.9 MG/DL (ref 8.6–10.5)
CALCIUM SPEC-SCNC: 7.9 MG/DL (ref 8.6–10.5)
CALCIUM SPEC-SCNC: 8 MG/DL (ref 8.6–10.5)
CALCIUM SPEC-SCNC: 8 MG/DL (ref 8.6–10.5)
CALCIUM SPEC-SCNC: 8.1 MG/DL (ref 8.6–10.5)
CALCIUM SPEC-SCNC: 8.9 MG/DL (ref 8.6–10.5)
CHLORIDE SERPL-SCNC: 100 MMOL/L (ref 98–107)
CHLORIDE SERPL-SCNC: 102 MMOL/L (ref 98–107)
CHLORIDE SERPL-SCNC: 91 MMOL/L (ref 98–107)
CHLORIDE SERPL-SCNC: 94 MMOL/L (ref 98–107)
CHLORIDE SERPL-SCNC: 95 MMOL/L (ref 98–107)
CHLORIDE SERPL-SCNC: 96 MMOL/L (ref 98–107)
CO2 SERPL-SCNC: 11.4 MMOL/L (ref 22–29)
CO2 SERPL-SCNC: 5.2 MMOL/L (ref 22–29)
CO2 SERPL-SCNC: 5.4 MMOL/L (ref 22–29)
CO2 SERPL-SCNC: 6.4 MMOL/L (ref 22–29)
CO2 SERPL-SCNC: 6.9 MMOL/L (ref 22–29)
CO2 SERPL-SCNC: 7 MMOL/L (ref 22–29)
CREAT SERPL-MCNC: 0.55 MG/DL (ref 0.76–1.27)
CREAT SERPL-MCNC: 0.59 MG/DL (ref 0.76–1.27)
CREAT SERPL-MCNC: 0.63 MG/DL (ref 0.76–1.27)
CREAT SERPL-MCNC: 0.67 MG/DL (ref 0.76–1.27)
CREAT SERPL-MCNC: 0.69 MG/DL (ref 0.76–1.27)
CREAT SERPL-MCNC: 0.75 MG/DL (ref 0.76–1.27)
DEPRECATED RDW RBC AUTO: 41 FL (ref 37–54)
DEPRECATED RDW RBC AUTO: 41 FL (ref 37–54)
DEPRECATED RDW RBC AUTO: 41.6 FL (ref 37–54)
EGFRCR SERPLBLD CKD-EPI 2021: 108.6 ML/MIN/1.73
EGFRCR SERPLBLD CKD-EPI 2021: 111.3 ML/MIN/1.73
EGFRCR SERPLBLD CKD-EPI 2021: 112.3 ML/MIN/1.73
EGFRCR SERPLBLD CKD-EPI 2021: 114.5 ML/MIN/1.73
EGFRCR SERPLBLD CKD-EPI 2021: 116.7 ML/MIN/1.73
EGFRCR SERPLBLD CKD-EPI 2021: 119.2 ML/MIN/1.73
EOSINOPHIL # BLD AUTO: 0.39 10*3/MM3 (ref 0–0.4)
EOSINOPHIL # BLD AUTO: 0.44 10*3/MM3 (ref 0–0.4)
EOSINOPHIL # BLD AUTO: 0.45 10*3/MM3 (ref 0–0.4)
EOSINOPHIL NFR BLD AUTO: 6.2 % (ref 0.3–6.2)
EOSINOPHIL NFR BLD AUTO: 6.8 % (ref 0.3–6.2)
EOSINOPHIL NFR BLD AUTO: 7.5 % (ref 0.3–6.2)
ERYTHROCYTE [DISTWIDTH] IN BLOOD BY AUTOMATED COUNT: 12.5 % (ref 12.3–15.4)
ERYTHROCYTE [DISTWIDTH] IN BLOOD BY AUTOMATED COUNT: 12.6 % (ref 12.3–15.4)
ERYTHROCYTE [DISTWIDTH] IN BLOOD BY AUTOMATED COUNT: 12.7 % (ref 12.3–15.4)
GEN 5 2HR TROPONIN T REFLEX: 6 NG/L
GLOBULIN UR ELPH-MCNC: 3.5 GM/DL
GLOBULIN UR ELPH-MCNC: 3.6 GM/DL
GLUCOSE BLDC GLUCOMTR-MCNC: 222 MG/DL (ref 70–130)
GLUCOSE BLDC GLUCOMTR-MCNC: 235 MG/DL (ref 70–130)
GLUCOSE BLDC GLUCOMTR-MCNC: 237 MG/DL (ref 70–130)
GLUCOSE BLDC GLUCOMTR-MCNC: 253 MG/DL (ref 70–130)
GLUCOSE BLDC GLUCOMTR-MCNC: 255 MG/DL (ref 70–130)
GLUCOSE BLDC GLUCOMTR-MCNC: 275 MG/DL (ref 70–130)
GLUCOSE BLDC GLUCOMTR-MCNC: 284 MG/DL (ref 70–130)
GLUCOSE BLDC GLUCOMTR-MCNC: 308 MG/DL (ref 70–130)
GLUCOSE BLDC GLUCOMTR-MCNC: 315 MG/DL (ref 70–130)
GLUCOSE BLDC GLUCOMTR-MCNC: 327 MG/DL (ref 70–130)
GLUCOSE BLDC GLUCOMTR-MCNC: 328 MG/DL (ref 70–130)
GLUCOSE BLDC GLUCOMTR-MCNC: 329 MG/DL (ref 70–130)
GLUCOSE BLDC GLUCOMTR-MCNC: 336 MG/DL (ref 70–130)
GLUCOSE BLDC GLUCOMTR-MCNC: 337 MG/DL (ref 70–130)
GLUCOSE BLDC GLUCOMTR-MCNC: 363 MG/DL (ref 70–130)
GLUCOSE BLDC GLUCOMTR-MCNC: 388 MG/DL (ref 70–130)
GLUCOSE SERPL-MCNC: 202 MG/DL (ref 65–99)
GLUCOSE SERPL-MCNC: 275 MG/DL (ref 65–99)
GLUCOSE SERPL-MCNC: 304 MG/DL (ref 65–99)
GLUCOSE SERPL-MCNC: 309 MG/DL (ref 65–99)
GLUCOSE SERPL-MCNC: 317 MG/DL (ref 65–99)
GLUCOSE SERPL-MCNC: 347 MG/DL (ref 65–99)
HBA1C MFR BLD: 11 % (ref 4.8–5.6)
HCT VFR BLD AUTO: 35.6 % (ref 37.5–51)
HCT VFR BLD AUTO: 38.2 % (ref 37.5–51)
HCT VFR BLD AUTO: 39 % (ref 37.5–51)
HGB BLD-MCNC: 13.1 G/DL (ref 13–17.7)
HGB BLD-MCNC: 14.1 G/DL (ref 13–17.7)
HGB BLD-MCNC: 14.6 G/DL (ref 13–17.7)
IMM GRANULOCYTES # BLD AUTO: 0.02 10*3/MM3 (ref 0–0.05)
IMM GRANULOCYTES # BLD AUTO: 0.02 10*3/MM3 (ref 0–0.05)
IMM GRANULOCYTES # BLD AUTO: 0.03 10*3/MM3 (ref 0–0.05)
IMM GRANULOCYTES NFR BLD AUTO: 0.3 % (ref 0–0.5)
IMM GRANULOCYTES NFR BLD AUTO: 0.3 % (ref 0–0.5)
IMM GRANULOCYTES NFR BLD AUTO: 0.5 % (ref 0–0.5)
LYMPHOCYTES # BLD AUTO: 2.19 10*3/MM3 (ref 0.7–3.1)
LYMPHOCYTES # BLD AUTO: 2.32 10*3/MM3 (ref 0.7–3.1)
LYMPHOCYTES # BLD AUTO: 2.63 10*3/MM3 (ref 0.7–3.1)
LYMPHOCYTES NFR BLD AUTO: 34.8 % (ref 19.6–45.3)
LYMPHOCYTES NFR BLD AUTO: 39.7 % (ref 19.6–45.3)
LYMPHOCYTES NFR BLD AUTO: 39.7 % (ref 19.6–45.3)
MAGNESIUM SERPL-MCNC: 1.6 MG/DL (ref 1.6–2.6)
MAGNESIUM SERPL-MCNC: 1.7 MG/DL (ref 1.6–2.6)
MAGNESIUM SERPL-MCNC: 2 MG/DL (ref 1.6–2.6)
MAGNESIUM SERPL-MCNC: 2.2 MG/DL (ref 1.6–2.6)
MCH RBC QN AUTO: 33.5 PG (ref 26.6–33)
MCH RBC QN AUTO: 33.9 PG (ref 26.6–33)
MCH RBC QN AUTO: 34 PG (ref 26.6–33)
MCHC RBC AUTO-ENTMCNC: 36.8 G/DL (ref 31.5–35.7)
MCHC RBC AUTO-ENTMCNC: 36.9 G/DL (ref 31.5–35.7)
MCHC RBC AUTO-ENTMCNC: 37.4 G/DL (ref 31.5–35.7)
MCV RBC AUTO: 90.7 FL (ref 79–97)
MCV RBC AUTO: 90.7 FL (ref 79–97)
MCV RBC AUTO: 92.2 FL (ref 79–97)
MONOCYTES # BLD AUTO: 0.35 10*3/MM3 (ref 0.1–0.9)
MONOCYTES # BLD AUTO: 0.38 10*3/MM3 (ref 0.1–0.9)
MONOCYTES # BLD AUTO: 0.44 10*3/MM3 (ref 0.1–0.9)
MONOCYTES NFR BLD AUTO: 5.6 % (ref 5–12)
MONOCYTES NFR BLD AUTO: 6.5 % (ref 5–12)
MONOCYTES NFR BLD AUTO: 6.6 % (ref 5–12)
NEUTROPHILS NFR BLD AUTO: 2.6 10*3/MM3 (ref 1.7–7)
NEUTROPHILS NFR BLD AUTO: 3.01 10*3/MM3 (ref 1.7–7)
NEUTROPHILS NFR BLD AUTO: 3.29 10*3/MM3 (ref 1.7–7)
NEUTROPHILS NFR BLD AUTO: 44.6 % (ref 42.7–76)
NEUTROPHILS NFR BLD AUTO: 45.4 % (ref 42.7–76)
NEUTROPHILS NFR BLD AUTO: 52.1 % (ref 42.7–76)
NRBC BLD AUTO-RTO: 0 /100 WBC (ref 0–0.2)
NRBC BLD AUTO-RTO: 0.3 /100 WBC (ref 0–0.2)
NRBC BLD AUTO-RTO: 1.4 /100 WBC (ref 0–0.2)
OSMOLALITY SERPL: 310 MOSM/KG (ref 275–300)
PHOSPHATE SERPL-MCNC: 2.2 MG/DL (ref 2.5–4.5)
PHOSPHATE SERPL-MCNC: 2.2 MG/DL (ref 2.5–4.5)
PHOSPHATE SERPL-MCNC: 2.6 MG/DL (ref 2.5–4.5)
PHOSPHATE SERPL-MCNC: 3.5 MG/DL (ref 2.5–4.5)
PLATELET # BLD AUTO: 127 10*3/MM3 (ref 140–450)
PLATELET # BLD AUTO: 140 10*3/MM3 (ref 140–450)
PLATELET # BLD AUTO: 160 10*3/MM3 (ref 140–450)
PMV BLD AUTO: 11.8 FL (ref 6–12)
PMV BLD AUTO: 12.2 FL (ref 6–12)
PMV BLD AUTO: 12.4 FL (ref 6–12)
POTASSIUM SERPL-SCNC: 4 MMOL/L (ref 3.5–5.2)
POTASSIUM SERPL-SCNC: 4.6 MMOL/L (ref 3.5–5.2)
POTASSIUM SERPL-SCNC: 4.7 MMOL/L (ref 3.5–5.2)
POTASSIUM SERPL-SCNC: 5.2 MMOL/L (ref 3.5–5.2)
POTASSIUM SERPL-SCNC: 5.2 MMOL/L (ref 3.5–5.2)
POTASSIUM SERPL-SCNC: 5.7 MMOL/L (ref 3.5–5.2)
PROT SERPL-MCNC: 6.4 G/DL (ref 6–8.5)
PROT SERPL-MCNC: 6.4 G/DL (ref 6–8.5)
RBC # BLD AUTO: 3.86 10*6/MM3 (ref 4.14–5.8)
RBC # BLD AUTO: 4.21 10*6/MM3 (ref 4.14–5.8)
RBC # BLD AUTO: 4.3 10*6/MM3 (ref 4.14–5.8)
RBC MORPH BLD: NORMAL
SMALL PLATELETS BLD QL SMEAR: ADEQUATE
SODIUM SERPL-SCNC: 122 MMOL/L (ref 136–145)
SODIUM SERPL-SCNC: 125 MMOL/L (ref 136–145)
SODIUM SERPL-SCNC: 128 MMOL/L (ref 136–145)
SODIUM SERPL-SCNC: 131 MMOL/L (ref 136–145)
TROPONIN T DELTA: 0 NG/L
TROPONIN T SERPL HS-MCNC: 6 NG/L
WBC MORPH BLD: NORMAL
WBC NRBC COR # BLD: 5.84 10*3/MM3 (ref 3.4–10.8)
WBC NRBC COR # BLD: 6.3 10*3/MM3 (ref 3.4–10.8)
WBC NRBC COR # BLD: 6.63 10*3/MM3 (ref 3.4–10.8)

## 2023-10-06 PROCEDURE — 84484 ASSAY OF TROPONIN QUANT: CPT | Performed by: INTERNAL MEDICINE

## 2023-10-06 PROCEDURE — 80053 COMPREHEN METABOLIC PANEL: CPT | Performed by: INTERNAL MEDICINE

## 2023-10-06 PROCEDURE — 63710000001 DIPHENHYDRAMINE PER 50 MG: Performed by: INTERNAL MEDICINE

## 2023-10-06 PROCEDURE — 25010000002 METOCLOPRAMIDE PER 10 MG: Performed by: INTERNAL MEDICINE

## 2023-10-06 PROCEDURE — 25010000002 KETOROLAC TROMETHAMINE PER 15 MG: Performed by: INTERNAL MEDICINE

## 2023-10-06 PROCEDURE — 83930 ASSAY OF BLOOD OSMOLALITY: CPT | Performed by: INTERNAL MEDICINE

## 2023-10-06 PROCEDURE — 25010000002 ENOXAPARIN PER 10 MG: Performed by: STUDENT IN AN ORGANIZED HEALTH CARE EDUCATION/TRAINING PROGRAM

## 2023-10-06 PROCEDURE — 82948 REAGENT STRIP/BLOOD GLUCOSE: CPT

## 2023-10-06 PROCEDURE — 84100 ASSAY OF PHOSPHORUS: CPT | Performed by: INTERNAL MEDICINE

## 2023-10-06 PROCEDURE — 25810000003 SODIUM CHLORIDE 0.9 % SOLUTION: Performed by: STUDENT IN AN ORGANIZED HEALTH CARE EDUCATION/TRAINING PROGRAM

## 2023-10-06 PROCEDURE — 71045 X-RAY EXAM CHEST 1 VIEW: CPT

## 2023-10-06 PROCEDURE — 63710000001 INSULIN ASPART PER 5 UNITS: Performed by: STUDENT IN AN ORGANIZED HEALTH CARE EDUCATION/TRAINING PROGRAM

## 2023-10-06 PROCEDURE — 85025 COMPLETE CBC W/AUTO DIFF WBC: CPT | Performed by: INTERNAL MEDICINE

## 2023-10-06 PROCEDURE — 83036 HEMOGLOBIN GLYCOSYLATED A1C: CPT | Performed by: INTERNAL MEDICINE

## 2023-10-06 PROCEDURE — 83735 ASSAY OF MAGNESIUM: CPT | Performed by: INTERNAL MEDICINE

## 2023-10-06 PROCEDURE — 85007 BL SMEAR W/DIFF WBC COUNT: CPT

## 2023-10-06 PROCEDURE — 25810000003 SODIUM CHLORIDE 0.9 % SOLUTION: Performed by: EMERGENCY MEDICINE

## 2023-10-06 PROCEDURE — 25010000002 MAGNESIUM SULFATE 2 GM/50ML SOLUTION: Performed by: INTERNAL MEDICINE

## 2023-10-06 PROCEDURE — 25810000003 SODIUM CHLORIDE 0.9 % SOLUTION: Performed by: INTERNAL MEDICINE

## 2023-10-06 PROCEDURE — G0378 HOSPITAL OBSERVATION PER HR: HCPCS

## 2023-10-06 PROCEDURE — 85025 COMPLETE CBC W/AUTO DIFF WBC: CPT | Performed by: STUDENT IN AN ORGANIZED HEALTH CARE EDUCATION/TRAINING PROGRAM

## 2023-10-06 PROCEDURE — 25010000002 SODIUM CHLORIDE 0.9 % WITH KCL 20 MEQ 20-0.9 MEQ/L-% SOLUTION: Performed by: INTERNAL MEDICINE

## 2023-10-06 PROCEDURE — 25010000002 KETOROLAC TROMETHAMINE PER 15 MG: Performed by: EMERGENCY MEDICINE

## 2023-10-06 PROCEDURE — 63710000001 INSULIN REGULAR HUMAN PER 5 UNITS: Performed by: EMERGENCY MEDICINE

## 2023-10-06 PROCEDURE — 85025 COMPLETE CBC W/AUTO DIFF WBC: CPT

## 2023-10-06 RX ORDER — SODIUM CHLORIDE 0.9 % (FLUSH) 0.9 %
10 SYRINGE (ML) INJECTION AS NEEDED
Status: DISCONTINUED | OUTPATIENT
Start: 2023-10-06 | End: 2023-10-07 | Stop reason: HOSPADM

## 2023-10-06 RX ORDER — ACETAMINOPHEN 650 MG/1
650 SUPPOSITORY RECTAL EVERY 4 HOURS PRN
Status: DISCONTINUED | OUTPATIENT
Start: 2023-10-06 | End: 2023-10-07 | Stop reason: HOSPADM

## 2023-10-06 RX ORDER — SODIUM CHLORIDE AND POTASSIUM CHLORIDE 150; 450 MG/100ML; MG/100ML
250 INJECTION, SOLUTION INTRAVENOUS CONTINUOUS PRN
Status: DISCONTINUED | OUTPATIENT
Start: 2023-10-06 | End: 2023-10-07 | Stop reason: HOSPADM

## 2023-10-06 RX ORDER — INSULIN ASPART 100 [IU]/ML
1-200 INJECTION, SOLUTION INTRAVENOUS; SUBCUTANEOUS AS NEEDED
Status: DISCONTINUED | OUTPATIENT
Start: 2023-10-06 | End: 2023-10-06 | Stop reason: ALTCHOICE

## 2023-10-06 RX ORDER — BISACODYL 5 MG/1
5 TABLET, DELAYED RELEASE ORAL DAILY PRN
Status: DISCONTINUED | OUTPATIENT
Start: 2023-10-06 | End: 2023-10-07 | Stop reason: HOSPADM

## 2023-10-06 RX ORDER — BISACODYL 10 MG
10 SUPPOSITORY, RECTAL RECTAL DAILY PRN
Status: DISCONTINUED | OUTPATIENT
Start: 2023-10-06 | End: 2023-10-07 | Stop reason: HOSPADM

## 2023-10-06 RX ORDER — DEXTROSE MONOHYDRATE 25 G/50ML
10-50 INJECTION, SOLUTION INTRAVENOUS
Status: DISCONTINUED | OUTPATIENT
Start: 2023-10-06 | End: 2023-10-06 | Stop reason: SDUPTHER

## 2023-10-06 RX ORDER — SODIUM CHLORIDE AND POTASSIUM CHLORIDE 150; 900 MG/100ML; MG/100ML
250 INJECTION, SOLUTION INTRAVENOUS CONTINUOUS PRN
Status: DISCONTINUED | OUTPATIENT
Start: 2023-10-06 | End: 2023-10-07 | Stop reason: HOSPADM

## 2023-10-06 RX ORDER — AMOXICILLIN 250 MG
2 CAPSULE ORAL 2 TIMES DAILY
Status: DISCONTINUED | OUTPATIENT
Start: 2023-10-06 | End: 2023-10-07 | Stop reason: HOSPADM

## 2023-10-06 RX ORDER — ENOXAPARIN SODIUM 100 MG/ML
40 INJECTION SUBCUTANEOUS EVERY 12 HOURS
Status: DISCONTINUED | OUTPATIENT
Start: 2023-10-06 | End: 2023-10-06

## 2023-10-06 RX ORDER — FENOFIBRATE 48 MG/1
48 TABLET, COATED ORAL DAILY
Status: DISCONTINUED | OUTPATIENT
Start: 2023-10-06 | End: 2023-10-07 | Stop reason: HOSPADM

## 2023-10-06 RX ORDER — SODIUM CHLORIDE AND POTASSIUM CHLORIDE 300; 900 MG/100ML; MG/100ML
250 INJECTION, SOLUTION INTRAVENOUS CONTINUOUS PRN
Status: DISCONTINUED | OUTPATIENT
Start: 2023-10-06 | End: 2023-10-07 | Stop reason: HOSPADM

## 2023-10-06 RX ORDER — KETOROLAC TROMETHAMINE 30 MG/ML
10 INJECTION, SOLUTION INTRAMUSCULAR; INTRAVENOUS ONCE
Status: COMPLETED | OUTPATIENT
Start: 2023-10-06 | End: 2023-10-06

## 2023-10-06 RX ORDER — ATORVASTATIN CALCIUM 40 MG/1
40 TABLET, FILM COATED ORAL DAILY
Status: DISCONTINUED | OUTPATIENT
Start: 2023-10-06 | End: 2023-10-07 | Stop reason: HOSPADM

## 2023-10-06 RX ORDER — ONDANSETRON 2 MG/ML
4 INJECTION INTRAMUSCULAR; INTRAVENOUS EVERY 6 HOURS PRN
Status: DISCONTINUED | OUTPATIENT
Start: 2023-10-06 | End: 2023-10-07 | Stop reason: HOSPADM

## 2023-10-06 RX ORDER — SODIUM CHLORIDE 9 MG/ML
100 INJECTION, SOLUTION INTRAVENOUS CONTINUOUS
Status: DISCONTINUED | OUTPATIENT
Start: 2023-10-06 | End: 2023-10-07 | Stop reason: HOSPADM

## 2023-10-06 RX ORDER — POLYETHYLENE GLYCOL 3350 17 G/17G
17 POWDER, FOR SOLUTION ORAL DAILY PRN
Status: DISCONTINUED | OUTPATIENT
Start: 2023-10-06 | End: 2023-10-07 | Stop reason: HOSPADM

## 2023-10-06 RX ORDER — SODIUM CHLORIDE 450 MG/100ML
250 INJECTION, SOLUTION INTRAVENOUS CONTINUOUS PRN
Status: DISCONTINUED | OUTPATIENT
Start: 2023-10-06 | End: 2023-10-07 | Stop reason: HOSPADM

## 2023-10-06 RX ORDER — DEXTROSE MONOHYDRATE 25 G/50ML
10-50 INJECTION, SOLUTION INTRAVENOUS
Status: DISCONTINUED | OUTPATIENT
Start: 2023-10-06 | End: 2023-10-07 | Stop reason: HOSPADM

## 2023-10-06 RX ORDER — SODIUM CHLORIDE 0.9 % (FLUSH) 0.9 %
10 SYRINGE (ML) INJECTION EVERY 12 HOURS SCHEDULED
Status: DISCONTINUED | OUTPATIENT
Start: 2023-10-06 | End: 2023-10-07 | Stop reason: HOSPADM

## 2023-10-06 RX ORDER — DEXTROSE MONOHYDRATE, SODIUM CHLORIDE, AND POTASSIUM CHLORIDE 50; 1.49; 4.5 G/1000ML; G/1000ML; G/1000ML
150 INJECTION, SOLUTION INTRAVENOUS CONTINUOUS PRN
Status: DISCONTINUED | OUTPATIENT
Start: 2023-10-06 | End: 2023-10-07 | Stop reason: HOSPADM

## 2023-10-06 RX ORDER — KETOROLAC TROMETHAMINE 30 MG/ML
30 INJECTION, SOLUTION INTRAMUSCULAR; INTRAVENOUS EVERY 6 HOURS PRN
Status: DISCONTINUED | OUTPATIENT
Start: 2023-10-06 | End: 2023-10-07 | Stop reason: HOSPADM

## 2023-10-06 RX ORDER — SODIUM CHLORIDE 9 MG/ML
40 INJECTION, SOLUTION INTRAVENOUS AS NEEDED
Status: DISCONTINUED | OUTPATIENT
Start: 2023-10-06 | End: 2023-10-07 | Stop reason: HOSPADM

## 2023-10-06 RX ORDER — NICOTINE POLACRILEX 4 MG
15 LOZENGE BUCCAL
Status: DISCONTINUED | OUTPATIENT
Start: 2023-10-06 | End: 2023-10-07

## 2023-10-06 RX ORDER — SODIUM CHLORIDE 9 MG/ML
250 INJECTION, SOLUTION INTRAVENOUS CONTINUOUS PRN
Status: DISCONTINUED | OUTPATIENT
Start: 2023-10-06 | End: 2023-10-07 | Stop reason: HOSPADM

## 2023-10-06 RX ORDER — IBUPROFEN 600 MG/1
1 TABLET ORAL
Status: DISCONTINUED | OUTPATIENT
Start: 2023-10-06 | End: 2023-10-06 | Stop reason: SDUPTHER

## 2023-10-06 RX ORDER — DEXTROSE AND SODIUM CHLORIDE 5; .45 G/100ML; G/100ML
150 INJECTION, SOLUTION INTRAVENOUS CONTINUOUS PRN
Status: DISCONTINUED | OUTPATIENT
Start: 2023-10-06 | End: 2023-10-07 | Stop reason: HOSPADM

## 2023-10-06 RX ORDER — DEXTROSE AND SODIUM CHLORIDE 5; .9 G/100ML; G/100ML
150 INJECTION, SOLUTION INTRAVENOUS CONTINUOUS PRN
Status: DISCONTINUED | OUTPATIENT
Start: 2023-10-06 | End: 2023-10-07 | Stop reason: HOSPADM

## 2023-10-06 RX ORDER — DEXTROSE MONOHYDRATE, SODIUM CHLORIDE, AND POTASSIUM CHLORIDE 50; 2.98; 9 G/1000ML; G/1000ML; G/1000ML
150 INJECTION, SOLUTION INTRAVENOUS CONTINUOUS PRN
Status: DISCONTINUED | OUTPATIENT
Start: 2023-10-06 | End: 2023-10-07 | Stop reason: HOSPADM

## 2023-10-06 RX ORDER — ACETAMINOPHEN 325 MG/1
650 TABLET ORAL EVERY 4 HOURS PRN
Status: DISCONTINUED | OUTPATIENT
Start: 2023-10-06 | End: 2023-10-07 | Stop reason: HOSPADM

## 2023-10-06 RX ORDER — ACETAMINOPHEN 160 MG/5ML
650 SOLUTION ORAL EVERY 4 HOURS PRN
Status: DISCONTINUED | OUTPATIENT
Start: 2023-10-06 | End: 2023-10-07 | Stop reason: HOSPADM

## 2023-10-06 RX ORDER — ENOXAPARIN SODIUM 100 MG/ML
40 INJECTION SUBCUTANEOUS EVERY 24 HOURS
Status: DISCONTINUED | OUTPATIENT
Start: 2023-10-06 | End: 2023-10-07 | Stop reason: HOSPADM

## 2023-10-06 RX ORDER — METOCLOPRAMIDE HYDROCHLORIDE 5 MG/ML
10 INJECTION INTRAMUSCULAR; INTRAVENOUS EVERY 6 HOURS PRN
Status: DISCONTINUED | OUTPATIENT
Start: 2023-10-06 | End: 2023-10-07 | Stop reason: HOSPADM

## 2023-10-06 RX ORDER — NICOTINE POLACRILEX 4 MG
15 LOZENGE BUCCAL
Status: DISCONTINUED | OUTPATIENT
Start: 2023-10-06 | End: 2023-10-06 | Stop reason: SDUPTHER

## 2023-10-06 RX ORDER — NITROGLYCERIN 0.4 MG/1
0.4 TABLET SUBLINGUAL
Status: DISCONTINUED | OUTPATIENT
Start: 2023-10-06 | End: 2023-10-07 | Stop reason: HOSPADM

## 2023-10-06 RX ORDER — DEXTROSE MONOHYDRATE, SODIUM CHLORIDE, AND POTASSIUM CHLORIDE 50; 2.98; 4.5 G/1000ML; G/1000ML; G/1000ML
150 INJECTION, SOLUTION INTRAVENOUS CONTINUOUS PRN
Status: DISCONTINUED | OUTPATIENT
Start: 2023-10-06 | End: 2023-10-07 | Stop reason: HOSPADM

## 2023-10-06 RX ORDER — BUTALBITAL, ACETAMINOPHEN AND CAFFEINE 50; 325; 40 MG/1; MG/1; MG/1
2 TABLET ORAL EVERY 4 HOURS PRN
Status: DISCONTINUED | OUTPATIENT
Start: 2023-10-06 | End: 2023-10-07 | Stop reason: HOSPADM

## 2023-10-06 RX ORDER — INSULIN ASPART 100 [IU]/ML
1-200 INJECTION, SOLUTION INTRAVENOUS; SUBCUTANEOUS
Status: DISCONTINUED | OUTPATIENT
Start: 2023-10-06 | End: 2023-10-06 | Stop reason: ALTCHOICE

## 2023-10-06 RX ORDER — MAGNESIUM SULFATE HEPTAHYDRATE 40 MG/ML
2 INJECTION, SOLUTION INTRAVENOUS ONCE
Status: COMPLETED | OUTPATIENT
Start: 2023-10-06 | End: 2023-10-06

## 2023-10-06 RX ORDER — DEXTROSE MONOHYDRATE, SODIUM CHLORIDE, AND POTASSIUM CHLORIDE 50; 1.49; 9 G/1000ML; G/1000ML; G/1000ML
150 INJECTION, SOLUTION INTRAVENOUS CONTINUOUS PRN
Status: DISCONTINUED | OUTPATIENT
Start: 2023-10-06 | End: 2023-10-07 | Stop reason: HOSPADM

## 2023-10-06 RX ORDER — DIPHENHYDRAMINE HCL 25 MG
50 CAPSULE ORAL EVERY 6 HOURS PRN
Status: DISCONTINUED | OUTPATIENT
Start: 2023-10-06 | End: 2023-10-07 | Stop reason: HOSPADM

## 2023-10-06 RX ADMIN — DOCUSATE SODIUM 50MG AND SENNOSIDES 8.6MG 2 TABLET: 8.6; 5 TABLET, FILM COATED ORAL at 09:18

## 2023-10-06 RX ADMIN — POTASSIUM CHLORIDE AND SODIUM CHLORIDE 250 ML/HR: 900; 150 INJECTION, SOLUTION INTRAVENOUS at 12:29

## 2023-10-06 RX ADMIN — DIPHENHYDRAMINE HYDROCHLORIDE 50 MG: 25 CAPSULE ORAL at 13:36

## 2023-10-06 RX ADMIN — DEXTROSE MONOHYDRATE, SODIUM CHLORIDE, AND POTASSIUM CHLORIDE 150 ML/HR: 50; 9; 1.49 INJECTION, SOLUTION INTRAVENOUS at 23:17

## 2023-10-06 RX ADMIN — MAGNESIUM SULFATE HEPTAHYDRATE 2 G: 2 INJECTION, SOLUTION INTRAVENOUS at 13:36

## 2023-10-06 RX ADMIN — Medication 10 ML: at 20:56

## 2023-10-06 RX ADMIN — METOCLOPRAMIDE 10 MG: 5 INJECTION, SOLUTION INTRAMUSCULAR; INTRAVENOUS at 13:36

## 2023-10-06 RX ADMIN — BUTALBITAL, ACETAMINOPHEN, AND CAFFEINE 2 TABLET: 50; 325; 40 TABLET ORAL at 18:19

## 2023-10-06 RX ADMIN — Medication 10 ML: at 09:50

## 2023-10-06 RX ADMIN — KETOROLAC TROMETHAMINE 30 MG: 30 INJECTION, SOLUTION INTRAMUSCULAR; INTRAVENOUS at 13:36

## 2023-10-06 RX ADMIN — KETOROLAC TROMETHAMINE 10 MG: 30 INJECTION, SOLUTION INTRAMUSCULAR; INTRAVENOUS at 04:52

## 2023-10-06 RX ADMIN — KETOROLAC TROMETHAMINE 30 MG: 30 INJECTION, SOLUTION INTRAMUSCULAR; INTRAVENOUS at 20:55

## 2023-10-06 RX ADMIN — ATORVASTATIN CALCIUM 40 MG: 40 TABLET, FILM COATED ORAL at 09:18

## 2023-10-06 RX ADMIN — INSULIN ASPART 2 UNITS: 100 INJECTION, SOLUTION INTRAVENOUS; SUBCUTANEOUS at 09:18

## 2023-10-06 RX ADMIN — Medication 10 ML: at 10:29

## 2023-10-06 RX ADMIN — BUTALBITAL, ACETAMINOPHEN, AND CAFFEINE 2 TABLET: 50; 325; 40 TABLET ORAL at 09:17

## 2023-10-06 RX ADMIN — FENOFIBRATE 48 MG: 48 TABLET ORAL at 10:28

## 2023-10-06 RX ADMIN — POTASSIUM CHLORIDE AND SODIUM CHLORIDE 250 ML/HR: 900; 150 INJECTION, SOLUTION INTRAVENOUS at 18:55

## 2023-10-06 RX ADMIN — INSULIN HUMAN 1 UNITS/HR: 1 INJECTION, SOLUTION INTRAVENOUS at 12:29

## 2023-10-06 RX ADMIN — SODIUM CHLORIDE 100 ML/HR: 9 INJECTION, SOLUTION INTRAVENOUS at 06:21

## 2023-10-06 RX ADMIN — SODIUM CHLORIDE 1000 ML: 9 INJECTION, SOLUTION INTRAVENOUS at 01:55

## 2023-10-06 RX ADMIN — HUMAN INSULIN 5 UNITS: 100 INJECTION, SOLUTION SUBCUTANEOUS at 03:16

## 2023-10-06 RX ADMIN — DIPHENHYDRAMINE HYDROCHLORIDE 50 MG: 25 CAPSULE ORAL at 20:55

## 2023-10-06 RX ADMIN — SODIUM CHLORIDE 1000 ML/HR: 9 INJECTION, SOLUTION INTRAVENOUS at 10:33

## 2023-10-06 NOTE — THERAPY EVALUATION
PT evaluation held this date per pt request due to having a headache. PT to follow up as appropriate.

## 2023-10-06 NOTE — PROGRESS NOTES
Nutrition Services    Patient Name:  Albin Ivory  YOB: 1970  MRN: [id number]  Admit Date:  10/5/2023    Pt was visited by dietetic intern to provide diet education on diabetes and triglyceride management. Pt was laying with eyes shut and all lights off. Pt has migraine and asked if we could come back once he feels better. RD to retry education as appropriate.    Electronically signed by:  Krystin Hay  10/06/23 15:02 EDT

## 2023-10-06 NOTE — ED PROVIDER NOTES
TRIAGE CHIEF COMPLAINT:     Nursing and triage notes reviewed    Chief Complaint   Patient presents with    Nausea    Hyperglycemia      HPI: Albin Ivory is a 53 y.o. male who presents to the emergency department complaining of hyperglycemia.  Patient denies any known history of diabetes.  He states for the past week he has not felt right.  He states he has had a dry mouth, increased thirst, increased urination, and increased hunger.  He states that his sugar was checked today and it was over 500 and then he checked it again and it was undetectably high.    REVIEW OF SYSTEMS: All other systems reviewed and are negative     PAST MEDICAL HISTORY:   Past Medical History:   Diagnosis Date    Back injury     Hypertension         FAMILY HISTORY:   History reviewed. No pertinent family history.     SOCIAL HISTORY:   Social History     Socioeconomic History    Marital status:    Tobacco Use    Smoking status: Every Day     Packs/day: 0.50     Types: Cigarettes   Substance and Sexual Activity    Alcohol use: Not Currently    Drug use: Yes     Types: Marijuana    Sexual activity: Defer        SURGICAL HISTORY:   Past Surgical History:   Procedure Laterality Date    BACK SURGERY  2017        CURRENT MEDICATIONS:      Medication List        ASK your doctor about these medications      clindamycin 300 MG capsule  Commonly known as: CLEOCIN  Take 1 capsule by mouth 3 (Three) Times a Day.     HYDROcodone-acetaminophen 5-325 MG per tablet  Commonly known as: NORCO  Take 1 tablet by mouth Every 6 (Six) Hours As Needed for Moderate Pain .               ALLERGIES: Patient has no known allergies.     PHYSICAL EXAM:   VITAL SIGNS:   Vitals:    10/05/23 2040   BP:    Pulse: 114   Resp:    Temp:    SpO2: 91%      CONSTITUTIONAL: Awake, oriented, appears nontoxic   HENT: Atraumatic, normocephalic, oral mucosa pink and dry, airway patent. Nares patent without drainage. External ears normal.   EYES: Conjunctivae clear   NECK: Trachea  midline   CARDIOVASCULAR: Normal heart rate, Normal rhythm, No murmurs, rubs, gallops   PULMONARY/CHEST: Clear to auscultation, no rhonchi, wheezes, or rales. Symmetrical breath sounds.  ABDOMINAL: Nondistended, soft, nontender - no rebound or guarding.   NEUROLOGIC: Nonfocal, moving all four extremities, no gross sensory or motor deficits.   EXTREMITIES: No clubbing, cyanosis, or edema   SKIN: Warm, Dry, No erythema, No rash     ED COURSE / MEDICAL DECISION MAKING:   Albin Ivory is a 53 y.o. male who presents to the emergency department for evaluation of hyperglycemia.  Patient is nondistressed on arrival in the emergency department, he is mildly tachycardic.  Other vital signs show hypertension.    Differential diagnosis includes diabetes, hyperglycemia among other etiologies.    Basic labs and a venous blood glass and urinalysis was ordered for further evaluation of the patient's presentation.    Diagnostic information from other sources: Family, chart review    Interventions: IV fluids, Zofran    EKG interpreted by me reveals sinus tachycardia with rate of 102 bpm.  There is low QRS voltage.  This is an atypical appearing EKG.    Narrative: Patient presents with hyperglycemia.  Patient was found to have multiple laboratory abnormalities including blood glucose of over 700.  Patient was not acidotic.  He was severely Dionicio eMac which made his blood specimens difficult to run in the lab.  Patient had a low CO2 and low sodium and elevated potassium.  Patient was given several liters of IV fluids as well as insulin.  His blood glucose did begin to improve however he was still severely light hemic.  I have spoken to the hospitalist who has agreed to accept patient for admission for further treatment and evaluation.      DECISION TO DISCHARGE/ADMIT: see ED care timeline     FINAL IMPRESSION:   1 --diabetes  2 --hyperglycemia  3 --hypertriglyceridemia    Electronically signed by: Tammi Conn MD, 10/5/2023 21:09  Tammi Ayers MD  10/06/23 0581

## 2023-10-06 NOTE — PROGRESS NOTES
Chart update: I examined Mr. Talamantes myself.  I noted that he had a high anion gap and a high blood sugar.  This was in combination with a low sodium and a high potassium concerning for DKA.  So went ahead and initiated DKA protocol and moved into the ICU.  His anion gap has not resolved as of this time.  We will continue ICU management and check on him again in the morning continue DKA protocol.

## 2023-10-06 NOTE — CASE MANAGEMENT/SOCIAL WORK
Case Management/Social Work    Patient Name:  Albin Ivory  YOB: 1970  MRN: [id number]  Admit Date:  10/5/2023          SW attempted to complete DCP at beside. Pt was not present at time of visit. RN station states he was just moved to ICU. SW will attempt again at a later time.       Electronically signed by:  BETZY Beltran  10/06/23 13:49 EDT

## 2023-10-06 NOTE — CONSULTS
"Adult Nutrition  Assessment/PES    Patient Name:  Albin Ivory  YOB: 1970  MRN: [id number]  Admit Date:  10/5/2023    Assessment Date:  10/6/2023    Comments:      Pt consulted due to NSG and newly diagnosed T2DM. Admission glucose > 500. Hx of IV drug use, heroin. PMHx of asthma, hep C, discitis and osteomyelitis of lumbar spine, chronic back pain due to tobacco use, foraminal stenosis of lumbar region, lumbar radiculopathy, MRSA skin infections, hypertension. K, anion gap, glucose, triglycerides, cholesterol HIGH. Na, Chl, Cr, Mg, Alb, HDL LOW. Kcal, fluid, and protein needs estimated using CBW and taking Dx and obesity into account. No PO intakes recorded at this time. Goal for patient: manage and treat disease and symptoms. RD to follow up and available PRN.     Reason for Assessment       Row Name 10/06/23 0835          Reason for Assessment    Reason For Assessment nurse/nurse practitioner consult (P)      Diagnosis cardiac disease;diabetes diagnosis/complications (P)                       Labs/Tests/Procedures/Meds       Row Name 10/06/23 0835          Labs/Procedures/Meds    Lab Results Reviewed reviewed, pertinent (P)      Lab Results Comments High K, anion gap, glucose, triglycerides, cholesterol. Low Na, Chl, Cr, Mg, Alb, HDL (P)         Medications    Pertinent Medications Reviewed reviewed, pertinent (P)      Pertinent Medications Comments Lipitor, lovenox, tricor, insulin, pericolace (P)                     Physical Findings       Row Name 10/06/23 0837          Physical Findings    Overall Physical Appearance obese per BMI (P)                     Estimated/Assessed Needs - Anthropometrics       Row Name 10/06/23 0839 10/06/23 0837       Anthropometrics    Height 182.9 cm (72\") (P)  182.9 cm (72\") (P)     Weight -- 120 kg (264 lb 8.8 oz) (P)     Height for Calculation -- 1.829 m (6') (P)     Weight for Calculation -- 120 kg (264 lb 8.8 oz) (P)        Estimated/Assessed Needs    " "Additional Documentation -- Estimated Calorie Needs (Group);Fluid Requirements (Group);KCAL/KG (Group);Protein Requirements (Group) (P)        Estimated Calorie Needs    Estimated Calorie Requirement (kcal/day) -- 2400 (P)     Estimated Calorie Need Method -- kcal/kg (P)        KCAL/KG    KCAL/KG -- 20 Kcal/Kg (kcal);25 Kcal/Kg (kcal) (P)     20 Kcal/Kg (kcal) -- 2400 (P)     25 Kcal/Kg (kcal) -- 3000 (P)        Protein Requirements    Weight Used For Protein Calculations -- 120 kg (264 lb 8.8 oz) (P)     Est Protein Requirement Amount (gms/kg) -- 1.5 gm protein (P)     Estimated Protein Requirements (gms/day) -- 180 (P)        Fluid Requirements    Fluid Requirements (mL/day) -- 2400 (P)     Estimated Fluid Requirement Method -- RDA Method (P)     RDA Method (mL) -- 2400 (P)       Row Name 10/06/23 0548          Anthropometrics    Height 182.9 cm (72\")     Weight 120 kg (264 lb 8.8 oz)                      Evaluation of Received Nutrient/Fluid Intake       Row Name 10/06/23 0839          Intake Assessment    Energy/Calorie Requirement Assessment -- (P)   insufficent data     Protein Requirement Assessment -- (P)   insufficent data     Fluid Requirement Assessment -- (P)   insufficent data        PO Evaluation    Number of Days PO Intake Evaluated Insufficient Data (P)         Vitals    Height 182.9 cm (72\") (P)                        Problem/Interventions:   Problem 1       Row Name 10/06/23 0840          Nutrition Diagnoses Problem 1    Problem 1 Excessive Nutrient Intake (P)      Macronutrient Kcal (P)      Etiology (related to) Factors Affecting Nutrition (P)      Signs/Symptoms (evidenced by) Biochemical;BMI;% IBW (P)      BMI 35 - 39.9 (P)      Percent (%)  % (P)      Labs Reviewed Done (P)      Specific Labs Noted Cholesterol;Glucose;Triglyceride (P)                           Intervention Goal       Row Name 10/06/23 0812          Intervention Goal    General Maintain nutrition;Improved nutrition " related lab(s);Reduce/improve symptoms;Meet nutritional needs for age/condition;Disease management/therapy (P)      PO Tolerate PO;Meet estimated needs (P)      Weight Maintain weight (P)                     Nutrition Intervention       Row Name 10/06/23 3645          Nutrition Intervention    RD/Tech Action Encourage intake;Follow Tx progress;Care plan reviewd (P)                       Education/Evaluation       Row Name 10/06/23 4696          Education    Education Will Instruct as appropriate;Education topics (P)      Education Topics Basic nutrition;Diabetes (P)         Monitor/Evaluation    Monitor Per protocol;PO intake;Pertinent labs;Weight;Skin status;Symptoms (P)                      Electronically signed by:  Krystin Hay  10/06/23 08:46 EDT

## 2023-10-06 NOTE — CASE MANAGEMENT/SOCIAL WORK
Discharge Planning Assessment  [facility]     Patient Name: Albin Ivory  MRN: [id number]  Today's Date: 10/6/2023    Admit Date: 10/5/2023    Plan: sdoh and dcp completed at bedside. Pt does not have a poa,lw,or primary care. [facility] and is [facility]. only dme is a cane. Pt states address given is his friend's home that he sometimes stays with. He states he is homeless, likes being outdoors, and has places he can stay. Transportation is an issue, he walks and states 1 leg stays numb, and sometimes can get rides. Gave homeless shelter gen'l information, and [facility] as pt has medicaid pending. He is food bag eligible, may need cab voucher at dc. Will follow.   Discharge Needs Assessment    No documentation.                  Discharge Plan       Row Name 10/06/23 2301       Plan    Plan sdoh and dcp completed at bedside. Pt does not have a poa,lw,or primary care. [facility] and is [facility] in [facility]. only dme is a cane. Pt states address given is his friend's home that he sometimes stays with. He states he is homeless, likes being outdoors, and has places he can stay. Transportation is an issue, he walks and states 1 leg stays numb, and sometimes can get rides. Gave homeless shelter gen'l information, and [facility] as pt has medicaid pending. He is food bag eligible, may need cab voucher at dc. Will follow.                  Continued Care and Services - Admitted Since 10/5/2023    Coordination has not been started for this encounter.          Demographic Summary       Row Name 10/06/23 1430       General Information    Admission Type observation    Arrived From emergency department    Referral Source admission list    Reason for Consult discharge planning    Preferred Language English       Contact Information    Permission Granted to Share Info With family/designee                   Functional Status       Row Name 10/06/23 1430       Functional Status    Usual Activity  Tolerance moderate    Current Activity Tolerance moderate       Functional Status, IADL    Medications independent    Meal Preparation independent    Housekeeping independent    Laundry independent    Shopping independent       Mental Status    General Appearance WDL WDL       Mental Status Summary    Recent Changes in Mental Status/Cognitive Functioning no changes       Employment/    Employment Status unemployed                   Psychosocial    No documentation.                  Abuse/Neglect    No documentation.                  Legal    No documentation.                  Substance Abuse    No documentation.                  Patient Forms    No documentation.                     Manasa Bolanos RN

## 2023-10-06 NOTE — H&P
[facility]      Name:  Albin Ivory   Age:  53 y.o.  Sex:  male  :  1970  MRN:  [id number]   Visit Number:  [id number]  Admission Date:  10/5/2023  Date Of Service:  10/06/23  Primary Care Physician:  Provider, No Known    Chief Complaint:     Hyperglycemia    History Of Presenting Illness:      Albin Ivory is a 53-year-old man with past medical history of IVDU including heroin, asthma, hepatitis C, history of discitis and osteomyelitis of lumbar spine with psoas abscess, chronic back pain tobacco use disorder, foraminal stenosis of lumbar region, lumbar radiculopathy history of MRSA skin infections requiring I&D's.  Presented to [facility] ED on 10/5/2023 with concern for hyperglycemia.  Denied any history of diabetes.  He said that for the past week he has felt real sick; describes dry mouth, increased thirst, increased urination, increased hunger.  Says blood sugar was checked today and it was over 500.  Says he has not done any intravenous drug use in many years.  Says that he has never had any treatment for hepatitis C.    ED summary: Afebrile, tachycardic which improved, hypertensive which improved, otherwise vital signs stable on room air.  EKG sinus rhythm, no ST elevations or depressions.  Sodium initially 118, improved to 122.  Potassium 6.0, improved to 5.7.  Creatinine 1.23 improved of 0.63.  Blood glucose 674, improved to 315.  Transaminitis resolved.  Total cholesterol 553, HDL 9, triglycerides over 4400.  Glucosuria on urinalysis.  Type 2 diabetes without DKA, new onset.  Severe triglyceridemia.  Hyponatremia.  He was provided Tylenol, insulin, Zofran, 3 L normal saline bolus.  He was treated in the ED for over 8 hours without significant improvement with blood glucose and sodium.  Hospital service consulted for observation admission given new onset diabetes requiring education, and hyponatremia.    Review Of Systems:    All systems were reviewed and  negative except as mentioned in history of presenting illness, assessment and plan.    Past Medical History: Patient  has a past medical history of Back injury and Hypertension.    Past Surgical History: Patient  has a past surgical history that includes Back surgery (2017).    Social History: Patient  reports that he has been smoking. He has been smoking an average of .5 packs per day. He does not have any smokeless tobacco history on file. He reports that he does not currently use alcohol. He reports current drug use. Drug: Marijuana.    Family History:  Patient's family history has been reviewed and found to be noncontributory.     Allergies:      Patient has no known allergies.    Home Medications:    Prior to Admission Medications       Prescriptions Last Dose Informant Patient Reported? Taking?    clindamycin (CLEOCIN) 300 MG capsule   No No    Take 1 capsule by mouth 3 (Three) Times a Day.    HYDROcodone-acetaminophen (NORCO) 5-325 MG per tablet   No No    Take 1 tablet by mouth Every 6 (Six) Hours As Needed for Moderate Pain .          ED Medications:    Medications   sodium chloride 0.9 % flush 10 mL (has no administration in time range)   sodium chloride 0.9 % bolus 2,000 mL (0 mL Intravenous Stopped 10/6/23 0227)   ondansetron (ZOFRAN) injection 4 mg (4 mg Intravenous Given 10/5/23 2138)   insulin regular (humuLIN R,novoLIN R) injection 10 Units (10 Units Intravenous Given 10/5/23 2230)   insulin regular (humuLIN R,novoLIN R) injection 10 Units (10 Units Intravenous Given 10/5/23 2328)   acetaminophen (TYLENOL) tablet 975 mg (975 mg Oral Given 10/5/23 2328)   sodium chloride 0.9 % bolus 1,000 mL (1,000 mL Intravenous New Bag 10/6/23 0155)   insulin regular (humuLIN R,novoLIN R) injection 5 Units (5 Units Subcutaneous Given 10/6/23 0316)     Vital Signs:  Temp:  [97.5 °F (36.4 °C)] 97.5 °F (36.4 °C)  Heart Rate:  [] 81  Resp:  [18-22] 18  BP: (125-176)/() 138/90        10/05/23  2013   Weight:  "118 kg (260 lb)     Body mass index is 35.26 kg/m².    Physical Exam:     Most recent vital Signs: /90   Pulse 81   Temp 97.5 °F (36.4 °C) (Oral)   Resp 18   Ht 182.9 cm (72\")   Wt 118 kg (260 lb)   SpO2 94%   BMI 35.26 kg/m²     Physical Exam  Constitutional:       General: He is not in acute distress.     Appearance: He is ill-appearing. He is not toxic-appearing.   HENT:      Mouth/Throat:      Mouth: Mucous membranes are moist.   Eyes:      Extraocular Movements: Extraocular movements intact.   Cardiovascular:      Rate and Rhythm: Normal rate and regular rhythm.      Pulses: Normal pulses.      Heart sounds: Normal heart sounds.   Pulmonary:      Effort: Pulmonary effort is normal.      Breath sounds: Normal breath sounds.   Abdominal:      Palpations: Abdomen is soft.      Tenderness: There is no abdominal tenderness.   Musculoskeletal:      Right lower leg: No edema.      Left lower leg: No edema.   Skin:     General: Skin is warm.   Neurological:      General: No focal deficit present.      Mental Status: He is alert and oriented to person, place, and time.   Psychiatric:         Mood and Affect: Mood normal.         Thought Content: Thought content normal.       Laboratory data:    I have reviewed the labs done in the emergency room.    Results from last 7 days   Lab Units 10/06/23  0350 10/05/23  2056   SODIUM mmol/L 122* 118*   POTASSIUM mmol/L 5.7* 6.0*   CHLORIDE mmol/L 91* 83*   CO2 mmol/L 6.4* 13.9*   BUN mg/dL 20 24*   CREATININE mg/dL 0.63* 1.23   CALCIUM mg/dL 8.9 9.9   BILIRUBIN mg/dL <0.2 0.5   ALK PHOS U/L 59 83   ALT (SGPT) U/L 5 117*   AST (SGOT) U/L 5 107*   GLUCOSE mg/dL 317* 674*     Results from last 7 days   Lab Units 10/06/23  0350   WBC 10*3/mm3 6.30   HEMOGLOBIN g/dL 13.1   HEMATOCRIT % 35.6*   PLATELETS 10*3/mm3 160                             Results from last 7 days   Lab Units 10/05/23  2141   COLOR UA  Yellow   GLUCOSE UA  >=1000 mg/dL (3+)*   KETONES UA  Negative "   LEUKOCYTES UA  Negative   PH, URINE  5.5   BILIRUBIN UA  Negative   UROBILINOGEN UA  0.2 E.U./dL       Pain Management Panel  More data may exist         Latest Ref Rng & Units 11/7/2018 10/21/2018   Pain Management Panel   Barbiturates Screen, Urine Negative Negative     -   Benzodiazepine Screen, Urine Negative Negative     -   Cocaine Screen, Urine Not Detected - Not Detected          Details          This result is from an external source.               EKG:      EKG sinus rhythm, no ST elevations or depressions    Radiology:    No radiology results for the last 3 days    Assessment/Plan:    Observation telemetry admission 10/6/2023 with new onset diabetes, hyponatremia, hyperkalemia, severe triglyceridemia.    At time of admission patient resting comfortably in bed, pleasantly conversational.  He does appear anxious about the diabetes diagnosis.    New onset diabetes  Glucomander subcutaneous insulin protocol.  Diabetes education.    Hyponatremia  IVF.  Monitor response.  Patient was likely significantly dehydrated.    Hyperkalemia  Patient requiring significant amounts of insulin, will continue to be temporized with insulin.    Severe triglyceridemia  Hyperlipidemia  Fenofibrate.  Atorvastatin.  Possible that severe triglyceridemia could harm his pancreas and contribute to diabetes.    Reported history of hepatitis C  Hepatitis panel.    Chronic:  past medical history of IVDU including heroin, asthma, hepatitis C, history of discitis and osteomyelitis of lumbar spine with psoas abscess, chronic back pain tobacco use disorder, foraminal stenosis of lumbar region, lumbar radiculopathy history of MRSA skin infections requiring I&D's.    Risk Assessment: High  DVT Prophylaxis: Lovenox  Code Status: Full code  Diet: Heart healthy/carbohydrate controlled    Advance Care Planning   ACP discussion was held with the patient during this visit. Patient does not have an advance directive, information provided.            Brian Joseph Kerley, DO  10/06/23  04:38 EDT    Dictated utilizing Dragon dictation.

## 2023-10-07 VITALS
HEIGHT: 72 IN | HEART RATE: 80 BPM | DIASTOLIC BLOOD PRESSURE: 90 MMHG | OXYGEN SATURATION: 95 % | WEIGHT: 257.06 LBS | BODY MASS INDEX: 34.82 KG/M2 | RESPIRATION RATE: 17 BRPM | TEMPERATURE: 98 F | SYSTOLIC BLOOD PRESSURE: 127 MMHG

## 2023-10-07 PROBLEM — E11.10 DKA (DIABETIC KETOACIDOSIS): Status: ACTIVE | Noted: 2023-10-07

## 2023-10-07 LAB
ANION GAP SERPL CALCULATED.3IONS-SCNC: 14 MMOL/L (ref 5–15)
ANION GAP SERPL CALCULATED.3IONS-SCNC: 15 MMOL/L (ref 5–15)
ANION GAP SERPL CALCULATED.3IONS-SCNC: 16.3 MMOL/L (ref 5–15)
ANION GAP SERPL CALCULATED.3IONS-SCNC: 21 MMOL/L (ref 5–15)
BASOPHILS # BLD AUTO: 0.05 10*3/MM3 (ref 0–0.2)
BASOPHILS NFR BLD AUTO: 0.9 % (ref 0–1.5)
BUN SERPL-MCNC: 10 MG/DL (ref 6–20)
BUN SERPL-MCNC: 10 MG/DL (ref 6–20)
BUN SERPL-MCNC: 7 MG/DL (ref 6–20)
BUN SERPL-MCNC: 8 MG/DL (ref 6–20)
BUN/CREAT SERPL: 14.5 (ref 7–25)
BUN/CREAT SERPL: 14.6 (ref 7–25)
BUN/CREAT SERPL: 16.9 (ref 7–25)
BUN/CREAT SERPL: 17.5 (ref 7–25)
CALCIUM SPEC-SCNC: 7.6 MG/DL (ref 8.6–10.5)
CALCIUM SPEC-SCNC: 7.7 MG/DL (ref 8.6–10.5)
CALCIUM SPEC-SCNC: 7.8 MG/DL (ref 8.6–10.5)
CALCIUM SPEC-SCNC: 8 MG/DL (ref 8.6–10.5)
CHLORIDE SERPL-SCNC: 105 MMOL/L (ref 98–107)
CHLORIDE SERPL-SCNC: 105 MMOL/L (ref 98–107)
CHLORIDE SERPL-SCNC: 106 MMOL/L (ref 98–107)
CHLORIDE SERPL-SCNC: 108 MMOL/L (ref 98–107)
CO2 SERPL-SCNC: 11 MMOL/L (ref 22–29)
CO2 SERPL-SCNC: 11.7 MMOL/L (ref 22–29)
CO2 SERPL-SCNC: 15 MMOL/L (ref 22–29)
CO2 SERPL-SCNC: 7 MMOL/L (ref 22–29)
CREAT SERPL-MCNC: 0.48 MG/DL (ref 0.76–1.27)
CREAT SERPL-MCNC: 0.55 MG/DL (ref 0.76–1.27)
CREAT SERPL-MCNC: 0.57 MG/DL (ref 0.76–1.27)
CREAT SERPL-MCNC: 0.59 MG/DL (ref 0.76–1.27)
DEPRECATED RDW RBC AUTO: 41.6 FL (ref 37–54)
EGFRCR SERPLBLD CKD-EPI 2021: 116.7 ML/MIN/1.73
EGFRCR SERPLBLD CKD-EPI 2021: 118 ML/MIN/1.73
EGFRCR SERPLBLD CKD-EPI 2021: 119.2 ML/MIN/1.73
EGFRCR SERPLBLD CKD-EPI 2021: 124.2 ML/MIN/1.73
EOSINOPHIL # BLD AUTO: 0.46 10*3/MM3 (ref 0–0.4)
EOSINOPHIL NFR BLD AUTO: 8.4 % (ref 0.3–6.2)
ERYTHROCYTE [DISTWIDTH] IN BLOOD BY AUTOMATED COUNT: 12.4 % (ref 12.3–15.4)
GLUCOSE BLDC GLUCOMTR-MCNC: 119 MG/DL (ref 70–130)
GLUCOSE BLDC GLUCOMTR-MCNC: 122 MG/DL (ref 70–130)
GLUCOSE BLDC GLUCOMTR-MCNC: 122 MG/DL (ref 70–130)
GLUCOSE BLDC GLUCOMTR-MCNC: 134 MG/DL (ref 70–130)
GLUCOSE BLDC GLUCOMTR-MCNC: 145 MG/DL (ref 70–130)
GLUCOSE BLDC GLUCOMTR-MCNC: 148 MG/DL (ref 70–130)
GLUCOSE BLDC GLUCOMTR-MCNC: 160 MG/DL (ref 70–130)
GLUCOSE BLDC GLUCOMTR-MCNC: 166 MG/DL (ref 70–130)
GLUCOSE BLDC GLUCOMTR-MCNC: 169 MG/DL (ref 70–130)
GLUCOSE BLDC GLUCOMTR-MCNC: 202 MG/DL (ref 70–130)
GLUCOSE BLDC GLUCOMTR-MCNC: 206 MG/DL (ref 70–130)
GLUCOSE BLDC GLUCOMTR-MCNC: 216 MG/DL (ref 70–130)
GLUCOSE BLDC GLUCOMTR-MCNC: 224 MG/DL (ref 70–130)
GLUCOSE BLDC GLUCOMTR-MCNC: 244 MG/DL (ref 70–130)
GLUCOSE SERPL-MCNC: 136 MG/DL (ref 65–99)
GLUCOSE SERPL-MCNC: 153 MG/DL (ref 65–99)
GLUCOSE SERPL-MCNC: 163 MG/DL (ref 65–99)
GLUCOSE SERPL-MCNC: 206 MG/DL (ref 65–99)
HCT VFR BLD AUTO: 38.2 % (ref 37.5–51)
HGB BLD-MCNC: 14.2 G/DL (ref 13–17.7)
IMM GRANULOCYTES # BLD AUTO: 0.02 10*3/MM3 (ref 0–0.05)
IMM GRANULOCYTES NFR BLD AUTO: 0.4 % (ref 0–0.5)
LYMPHOCYTES # BLD AUTO: 2.01 10*3/MM3 (ref 0.7–3.1)
LYMPHOCYTES NFR BLD AUTO: 36.8 % (ref 19.6–45.3)
MAGNESIUM SERPL-MCNC: 1.8 MG/DL (ref 1.6–2.6)
MAGNESIUM SERPL-MCNC: 1.9 MG/DL (ref 1.6–2.6)
MAGNESIUM SERPL-MCNC: 1.9 MG/DL (ref 1.6–2.6)
MAGNESIUM SERPL-MCNC: 2 MG/DL (ref 1.6–2.6)
MCH RBC QN AUTO: 34.2 PG (ref 26.6–33)
MCHC RBC AUTO-ENTMCNC: 37.2 G/DL (ref 31.5–35.7)
MCV RBC AUTO: 92 FL (ref 79–97)
MONOCYTES # BLD AUTO: 0.38 10*3/MM3 (ref 0.1–0.9)
MONOCYTES NFR BLD AUTO: 7 % (ref 5–12)
NEUTROPHILS NFR BLD AUTO: 2.54 10*3/MM3 (ref 1.7–7)
NEUTROPHILS NFR BLD AUTO: 46.5 % (ref 42.7–76)
NRBC BLD AUTO-RTO: 0 /100 WBC (ref 0–0.2)
PHOSPHATE SERPL-MCNC: 1.6 MG/DL (ref 2.5–4.5)
PHOSPHATE SERPL-MCNC: 2.2 MG/DL (ref 2.5–4.5)
PHOSPHATE SERPL-MCNC: 2.2 MG/DL (ref 2.5–4.5)
PHOSPHATE SERPL-MCNC: 2.3 MG/DL (ref 2.5–4.5)
PLATELET # BLD AUTO: 119 10*3/MM3 (ref 140–450)
PMV BLD AUTO: 11.5 FL (ref 6–12)
POTASSIUM SERPL-SCNC: 4 MMOL/L (ref 3.5–5.2)
POTASSIUM SERPL-SCNC: 4.1 MMOL/L (ref 3.5–5.2)
POTASSIUM SERPL-SCNC: 4.4 MMOL/L (ref 3.5–5.2)
POTASSIUM SERPL-SCNC: 5.4 MMOL/L (ref 3.5–5.2)
RBC # BLD AUTO: 4.15 10*6/MM3 (ref 4.14–5.8)
SODIUM SERPL-SCNC: 133 MMOL/L (ref 136–145)
SODIUM SERPL-SCNC: 133 MMOL/L (ref 136–145)
SODIUM SERPL-SCNC: 134 MMOL/L (ref 136–145)
SODIUM SERPL-SCNC: 135 MMOL/L (ref 136–145)
WBC NRBC COR # BLD: 5.46 10*3/MM3 (ref 3.4–10.8)

## 2023-10-07 PROCEDURE — 80048 BASIC METABOLIC PNL TOTAL CA: CPT | Performed by: INTERNAL MEDICINE

## 2023-10-07 PROCEDURE — 25010000002 KETOROLAC TROMETHAMINE PER 15 MG: Performed by: INTERNAL MEDICINE

## 2023-10-07 PROCEDURE — 97161 PT EVAL LOW COMPLEX 20 MIN: CPT

## 2023-10-07 PROCEDURE — 84100 ASSAY OF PHOSPHORUS: CPT | Performed by: INTERNAL MEDICINE

## 2023-10-07 PROCEDURE — 83735 ASSAY OF MAGNESIUM: CPT | Performed by: INTERNAL MEDICINE

## 2023-10-07 PROCEDURE — 82948 REAGENT STRIP/BLOOD GLUCOSE: CPT

## 2023-10-07 PROCEDURE — 63710000001 INSULIN ASPART PER 5 UNITS: Performed by: INTERNAL MEDICINE

## 2023-10-07 PROCEDURE — 63710000001 INSULIN DETEMIR PER 5 UNITS: Performed by: INTERNAL MEDICINE

## 2023-10-07 PROCEDURE — 85025 COMPLETE CBC W/AUTO DIFF WBC: CPT | Performed by: STUDENT IN AN ORGANIZED HEALTH CARE EDUCATION/TRAINING PROGRAM

## 2023-10-07 RX ORDER — ATORVASTATIN CALCIUM 40 MG/1
40 TABLET, FILM COATED ORAL DAILY
Qty: 90 TABLET | Refills: 2 | Status: SHIPPED | OUTPATIENT
Start: 2023-10-08

## 2023-10-07 RX ORDER — IBUPROFEN 600 MG/1
1 TABLET ORAL
Status: DISCONTINUED | OUTPATIENT
Start: 2023-10-07 | End: 2023-10-07 | Stop reason: HOSPADM

## 2023-10-07 RX ORDER — INSULIN ASPART 100 [IU]/ML
1-200 INJECTION, SOLUTION INTRAVENOUS; SUBCUTANEOUS AS NEEDED
Status: DISCONTINUED | OUTPATIENT
Start: 2023-10-07 | End: 2023-10-07 | Stop reason: HOSPADM

## 2023-10-07 RX ORDER — NICOTINE POLACRILEX 4 MG
15 LOZENGE BUCCAL
Status: DISCONTINUED | OUTPATIENT
Start: 2023-10-07 | End: 2023-10-07 | Stop reason: HOSPADM

## 2023-10-07 RX ORDER — HUMAN INSULIN 100 [USP'U]/ML
INJECTION, SUSPENSION SUBCUTANEOUS 2 TIMES DAILY WITH MEALS
Refills: 12 | Status: CANCELLED | OUTPATIENT
Start: 2023-10-07

## 2023-10-07 RX ORDER — INSULIN DETEMIR 100 [IU]/ML
15 INJECTION, SOLUTION SUBCUTANEOUS DAILY
Qty: 5 ML | Refills: 0 | Status: SHIPPED | OUTPATIENT
Start: 2023-10-07 | End: 2023-11-06

## 2023-10-07 RX ORDER — INSULIN ASPART 100 [IU]/ML
5 INJECTION, SOLUTION INTRAVENOUS; SUBCUTANEOUS
Qty: 4.5 ML | Refills: 0 | Status: SHIPPED | OUTPATIENT
Start: 2023-10-07 | End: 2023-11-06

## 2023-10-07 RX ORDER — INSULIN ASPART 100 [IU]/ML
1-200 INJECTION, SOLUTION INTRAVENOUS; SUBCUTANEOUS
Status: DISCONTINUED | OUTPATIENT
Start: 2023-10-07 | End: 2023-10-07 | Stop reason: HOSPADM

## 2023-10-07 RX ORDER — DEXTROSE MONOHYDRATE 25 G/50ML
10-50 INJECTION, SOLUTION INTRAVENOUS
Status: DISCONTINUED | OUTPATIENT
Start: 2023-10-07 | End: 2023-10-07 | Stop reason: HOSPADM

## 2023-10-07 RX ORDER — FENOFIBRATE 48 MG/1
48 TABLET, COATED ORAL DAILY
Qty: 30 TABLET | Refills: 0 | Status: SHIPPED | OUTPATIENT
Start: 2023-10-08 | End: 2023-11-07

## 2023-10-07 RX ADMIN — INSULIN ASPART 8 UNITS: 100 INJECTION, SOLUTION INTRAVENOUS; SUBCUTANEOUS at 17:36

## 2023-10-07 RX ADMIN — ATORVASTATIN CALCIUM 40 MG: 40 TABLET, FILM COATED ORAL at 09:04

## 2023-10-07 RX ADMIN — FENOFIBRATE 48 MG: 48 TABLET ORAL at 09:04

## 2023-10-07 RX ADMIN — INSULIN DETEMIR 17 UNITS: 100 INJECTION, SOLUTION SUBCUTANEOUS at 09:04

## 2023-10-07 RX ADMIN — BUTALBITAL, ACETAMINOPHEN, AND CAFFEINE 2 TABLET: 50; 325; 40 TABLET ORAL at 12:50

## 2023-10-07 RX ADMIN — KETOROLAC TROMETHAMINE 30 MG: 30 INJECTION, SOLUTION INTRAMUSCULAR; INTRAVENOUS at 09:09

## 2023-10-07 RX ADMIN — Medication 10 ML: at 09:04

## 2023-10-07 RX ADMIN — INSULIN ASPART 8 UNITS: 100 INJECTION, SOLUTION INTRAVENOUS; SUBCUTANEOUS at 12:50

## 2023-10-07 NOTE — PLAN OF CARE
Goal Outcome Evaluation:   VSS. DKA resolved. Pt being discharge home today. Insulin order sent to patient preferred pharmacy, to follow up with PCP in next week.

## 2023-10-07 NOTE — PLAN OF CARE
Goal Outcome Evaluation:  Plan of Care Reviewed With: patient        Progress: improving  Outcome Evaluation: BMP and glucose improving, HA slightly improving

## 2023-10-07 NOTE — PLAN OF CARE
Goal Outcome Evaluation:  Plan of Care Reviewed With: patient           Outcome Evaluation: PT evaluation completed this pm with pt presenting supine in bed, O x 4, on room air with only c/o pain being that he needed to try to have BM. Pt was independent with bed mobility and transfers without assistive device. Gait belt donned and pt amb with supervision only to 3rd floor bathroom. Pt has a lateral wt shift, wide NATALIE, and decreased step length with his gait. This is pt's baseline gait pattern due to previous injury several years ago. Pt's BLE strength found to be grossly WFLS. No functional deficits noted that need skilled PT intervention. Pt informed of findings and verbalized understanding and he thought he'd be going home soon.      Anticipated Discharge Disposition (PT): home

## 2023-10-07 NOTE — DISCHARGE SUMMARY
[facility]   DISCHARGE SUMMARY      Name:  Albin Ivory   Age:  53 y.o.  Sex:  male  :  1970  MRN:  [id number]   Visit Number:  [id number]    Admission Date:  10/5/2023  Date of Discharge:  10/7/2023  Primary Care Physician:  Provider, No Known    Important issues to note:    Start: Levemir and NovoLog prescription was also sent for glucometer.  Patient was given the pens not the vials.  Was also sent a prescription for Lipitor and Tricor.  Stop: Nothing  Follow up: PCP  Brief Summary: Presented as he had been feeling dehydrated and had his blood sugar checked and it was over 500 due to previously undiagnosed diabetes. Initially had shown signs of DKA with a high anion gap which improved with IV insulin and IV fluids.  Also had very high triglycerides which likely also benefited from the IV insulin and IV fluids.  Was started on Tricor and Lipitor for home.  Recommend close follow-up of his triglycerides outpatient.  Patient was ambulating not having any acute distress other than a headache at the time of discharge.  Prescriptions were sent for insulin and glucometer and medications for cholesterol.  If his triglycerides are coming down outpatient likely he could come off the Tricor.  He also has other chronic issues including history of hepatitis C for which she would benefit for follow-up with her primary care doctor.    Discharge Diagnoses:       New onset type 2 diabetes mellitus    DKA (diabetic ketoacidosis)        Problem List:     Active Hospital Problems    Diagnosis  POA    **New onset type 2 diabetes mellitus [E11.9]  Yes    DKA (diabetic ketoacidosis) [E11.10]  Yes      Resolved Hospital Problems   No resolved problems to display.     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  Presented to [facility] ED on 10/5/2023 with concern for hyperglycemia.  Denied any history of diabetes.  He said that for the past week he has felt real sick; describes dry mouth, increased thirst, increased urination, increased hunger.  Says blood sugar was checked today and it was over 500.  Says he has not done any intravenous drug use in many years.  Says that he has never had any treatment for hepatitis C.     ED summary: Afebrile, tachycardic which improved, hypertensive which improved, otherwise vital signs stable on room air.  EKG sinus rhythm, no ST elevations or depressions.  Sodium initially 118, improved to 122.  Potassium 6.0, improved to 5.7.  Creatinine 1.23 improved of 0.63.  Blood glucose 674, improved to 315.  Transaminitis resolved.  Total cholesterol 553, HDL 9, triglycerides over 4400.  Glucosuria on urinalysis.  Type 2 diabetes without DKA, new onset.  Severe triglyceridemia.  Hyponatremia.  He was provided Tylenol, insulin, Zofran, 3 L normal saline bolus.  He was treated in the ED for over 8 hours without significant improvement with blood glucose and sodium.  Hospital service consulted for observation admission given new onset diabetes requiring education, and hyponatremia.      Hospital course:   New onset diabetes/DKA  Hyperkalemia  Hyponatremia  Hypertriglyceridemia    -Patient was admitted and given DKA protocol with IV fluids and IV insulin anion gap closed.  Had a high potassium which resolved.  Was transitioned to subcutaneous insulin.  Prescription was given and sent to his pharmacy.  Suspect hypertriglyceridemia secondary to uncontrolled diabetes.  Encouraged tight blood  sugar control outpatient.  Prescription given for basal and bolus insulin confirmed co-pay $0 to his pharmacy.  Electrolyte derangements likely due to hyperglycemia hypertriglyceridemia and DKA.  They were vastly improved by the time of discharge.  I sent him prescription for Lipitor and Tricor.  He probably will only need the Tricor until his triglycerides improved.  Recommend rechecking his triglycerides outpatient and if they are more reasonable DC Tricor.  He was tolerating p.o. ambulating not having any pain aside from a headache at the time of discharge.    Vital Signs:    Temp:  [97.5 °F (36.4 °C)-98 °F (36.7 °C)] 98 °F (36.7 °C)  Heart Rate:  [60-80] 80  Resp:  [17] 17  BP: (110-136)/(69-95) 127/90    Physical Exam:  Constitutional:       General: He is not in acute distress.     Appearance: He is not ill-appearing. He is not toxic-appearing.   HENT:      Mouth/Throat:      Mouth: Mucous membranes are moist.   Eyes:      Extraocular Movements: Extraocular movements intact.   Cardiovascular:      Rate and Rhythm: Normal rate and regular rhythm.      Pulses: Normal pulses.      Heart sounds: Normal heart sounds.   Pulmonary:      Effort: Pulmonary effort is normal.      Breath sounds: Normal breath sounds.   Abdominal:      Palpations: Abdomen is soft.      Tenderness: There is no abdominal tenderness.   Musculoskeletal:      Right lower leg: No edema.      Left lower leg: No edema.   Skin:     General: Skin is warm.   Neurological:      General: No focal deficit present.      Mental Status: He is alert and oriented to person, place, and time.   Psychiatric:         Mood and Affect: Mood normal.         Thought Content: Thought content normal.       Pertinent Lab Results:     Results from last 7 days   Lab Units 10/07/23  1149 10/07/23  0740 10/07/23  0516 10/06/23  1243 10/06/23  0800 10/06/23  0350 10/05/23  2056   SODIUM mmol/L 133* 134* 135*   < > 125*  125* 122* 118*   POTASSIUM mmol/L 4.4 5.4* 4.1   < >  5.2  5.2 5.7* 6.0*   CHLORIDE mmol/L 105 108* 106   < > 94*  95* 91* 83*   CO2 mmol/L 11.7* 11.0* 15.0*   < > 5.2*  6.9* 6.4* 13.9*   BUN mg/dL 7 8 10   < > 17  17 20 24*   CREATININE mg/dL 0.48* 0.55* 0.57*   < > 0.69*  0.67* 0.63* 1.23   CALCIUM mg/dL 7.6* 8.0* 7.8*   < > 7.9*  8.0* 8.9 9.9   BILIRUBIN mg/dL  --   --   --   --  0.4 <0.2 0.5   ALK PHOS U/L  --   --   --   --  57 59 83   ALT (SGPT) U/L  --   --   --   --  93* 5 117*   AST (SGOT) U/L  --   --   --   --  88* 5 107*   GLUCOSE mg/dL 206* 153* 136*   < > 309*  304* 317* 674*    < > = values in this interval not displayed.     Results from last 7 days   Lab Units 10/07/23  0516 10/06/23  1027 10/06/23  0630   WBC 10*3/mm3 5.46 5.84 6.63   HEMOGLOBIN g/dL 14.2 14.6 14.1   HEMATOCRIT % 38.2 39.0 38.2   PLATELETS 10*3/mm3 119* 127* 140         Results from last 7 days   Lab Units 10/06/23  1243 10/06/23  0800   HSTROP T ng/L 6 6                           Pertinent Radiology Results:    Imaging Results (All)       Procedure Component Value Units Date/Time    XR Chest 1 View [[id number]] Collected: 10/06/23 1049     Updated: 10/06/23 1730    Narrative:      PROCEDURE: XR CHEST 1 VW-        HISTORY: Assess for Fluid Overload     COMPARISON: None.     FINDINGS: The heart is normal in size. There is mild vascular  engorgement. There is no dense consolidation. There is mild bronchial  wall thickening. There is prominence of the interstitium. There is no  edema. There is no pleural effusion. There is no pneumothorax. There are  no acute osseous abnormalities.       Impression:      Mild vascular engorgement and prominence of the  interstitium.     No edema or pleural effusion.     Mild bronchial wall thickening.                       Images were reviewed, interpreted, and dictated by SUSIE Aguilar  Transcribed by Elif Dominguez PA-C.     This report was signed and finalized on 10/6/2023 5:27 PM by SUSIE Benavides.                Echo:      Condition on Discharge:      Stable.    Code status during the hospital stay:    Code Status and Medical Interventions:   Ordered at: 10/06/23 0520     Code Status (Patient has no pulse and is not breathing):    CPR (Attempt to Resuscitate)     Medical Interventions (Patient has pulse or is breathing):    Full Support     Discharge Disposition:    Home or Self Care    Discharge Medications:       Discharge Medications        New Medications        Instructions Start Date   atorvastatin 40 MG tablet  Commonly known as: LIPITOR   40 mg, Oral, Daily   Start Date: October 8, 2023     fenofibrate 48 MG tablet  Commonly known as: TRICOR   48 mg, Oral, Daily   Start Date: October 8, 2023     Levemir FlexPen 100 UNIT/ML injection  Generic drug: insulin detemir   15 Units, Subcutaneous, Daily      metFORMIN 500 MG tablet  Commonly known as: Glucophage   Take 1 tablet by mouth Daily With Breakfast for 7 days, THEN 1 tablet 2 (Two) Times a Day With Meals for 7 days, THEN 1 tablet 3 (Three) Times a Day for 7 days, THEN 2 tablets 2 (Two) Times a Day With Meals for 7 days.   Start Date: October 7, 2023     NovoLOG FlexPen 100 UNIT/ML solution pen-injector sc pen  Generic drug: insulin aspart   5 Units, Subcutaneous, 3 Times Daily With Meals             Continue These Medications        Instructions Start Date   HYDROcodone-acetaminophen 5-325 MG per tablet  Commonly known as: NORCO   1 tablet, Oral, Every 6 Hours PRN             Discharge Diet:     Diet Instructions       Advance Diet As Tolerated -Target Diet: diabetic      Target Diet: diabetic          Activity at Discharge:       Follow-up Appointments:    Additional Instructions for the Follow-ups that You Need to Schedule       Discharge Follow-up with PCP   As directed       Currently Documented PCP:    Provider, No Known    PCP Phone Number:    [phone]     Follow Up Details: 1 week               Contact information for follow-up providers        Provider, No Known .    Why: 1 week  Contact information:  [facility]  [place] [address]  [phone]                       Contact information for after-discharge care       [facility] .    Service: [facility]  Contact information:  [address] [place] [id number]  [phone]                                 No future appointments.  Test Results Pending at Discharge:    Pending Labs       Order Current Status    Lavender Top In process    Brandon Draw In process               Carrillo Dempsey DO  10/07/23  16:32 EDT    Time: I spent 45 minutes on this discharge activity which included: face-to-face encounter with the patient, reviewing the data in the system, coordination of the care with the nursing staff as well as consultants, documentation, and entering orders.     Dictated utilizing Dragon dictation.
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
## 2023-10-07 NOTE — PLAN OF CARE
Goal Outcome Evaluation:  Plan of Care Reviewed With: patient           Outcome Evaluation: Patient a/o, VSS on RA, insulin gtt titrated appropriately throughout shift. Patient educated on importance of following diet recommendations while on IV insulin protocol. Patient removed BP cuff and refused to put back on for now. No other complaints at this time.

## 2023-10-08 NOTE — CASE MANAGEMENT/SOCIAL WORK
Case Management Discharge Note                Selected Continued Care - Discharged on 10/7/2023 Admission date: 10/5/2023 - Discharge disposition: Home or Self Care      Destination    No services have been selected for the patient.                Durable Medical Equipment    No services have been selected for the patient.                Dialysis/Infusion    No services have been selected for the patient.                Home Medical Care    No services have been selected for the patient.                Therapy    No services have been selected for the patient.                Community Resources Coordination complete.      Service Provider Selected Services Address Phone Fax Patient Preferred    [facility] PATIENTS ONLY [employer] [facility] [address] [place] [id number] [phone] -- --              Community & DME    No services have been selected for the patient.                    Transportation Services  Private: Car    Final Discharge Disposition Code: 01 - home or self-care

## 2023-10-09 NOTE — PAYOR COMM NOTE
"To:  [facility]  From: Sabrina Marie RN  Phone: [phone]  Fax: [phone]  NPI: [id number]  TIN: [id number]  Member ID: [id number]   MRN: [id number]    Tracey Ivory (53 y.o. Male)       Date of Birth   1970    Social Security Number       Address   [address] [address]    Home Phone   [phone]    MRN   [id number]       [employer]oration   Protestant    Marital Status   Legally                             Admission Date   10/5/23    Admission Type   Emergency    Admitting Provider   Carrillo Dempsey DO    Attending Provider       Department, Room/Bed   [facility] INTENSIVE CARE, I06/       Discharge Date   10/7/2023    Discharge Disposition   Home or Self Care    Discharge Destination   Home                              Attending Provider: (none)   Allergies: No Known Allergies    Isolation: None   Infection: None   Code Status: Prior    Ht: 182.9 cm (72\")   Wt: 117 kg (257 lb 0.9 oz)    Admission Cmt: None   Principal Problem: New onset type 2 diabetes mellitus [E11.9]                   Active Insurance as of 10/5/2023       Primary Coverage       Payor Plan Insurance Group Employer/Plan Group    [facility] COMMUNITY PLAN [facility] COMMUNITY PLAN [facility]       Payor Plan Address Payor Plan Phone Number Payor Plan Fax Number Effective Dates    [address]   10/1/2023 - None Entered    [facility] [id number]         Subscriber Name Subscriber Birth Date Member ID       TRACEY IVORY 1970 [id number]                     Emergency Contacts        (Rel.) Home Phone Work Phone Mobile Phone    ELVISFREDERICK (Friend) -- -- [phone]                 History & Physical        Kerley, Brian Joseph, DO at 10/06/23 0438            [facility] HOSPITALIST   HISTORY AND PHYSICAL      Name:  Tracey Ivory   Age:  53 y.o.  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  Presented to [facility] ED on 10/5/2023 with concern for hyperglycemia.  Denied any history of diabetes.  He said that for the past week he has felt real sick; describes dry mouth, increased thirst, increased urination, increased hunger.  Says blood sugar was checked today and it was over 500.  Says he has not done any intravenous drug use in many years.  Says that he has never had any treatment for hepatitis C.    ED summary: Afebrile, tachycardic which improved, hypertensive which improved, otherwise vital signs stable on room air.  EKG sinus rhythm, no ST elevations or depressions.  Sodium initially 118, improved to 122.  Potassium 6.0, improved to 5.7.  Creatinine 1.23 improved of 0.63.  Blood glucose 674, improved to 315.  Transaminitis resolved.  Total cholesterol 553, HDL 9, triglycerides over 4400.  Glucosuria on urinalysis.  Type 2 diabetes without DKA, new onset.  Severe triglyceridemia.  Hyponatremia.  He was provided Tylenol, insulin, Zofran, 3 L normal saline bolus.  He was treated in the ED for over 8 hours without significant improvement with blood glucose and sodium.  Hospital service consulted for observation admission given new onset diabetes requiring education, and hyponatremia.    Review Of Systems:    All systems were reviewed and negative except as mentioned in history of presenting illness, assessment and plan.    Past Medical History: Patient  has a past medical history of Back injury and Hypertension.    Past Surgical History: Patient  has a past surgical history that  "includes Back surgery (2017).    Social History: Patient  reports that he has been smoking. He has been smoking an average of .5 packs per day. He does not have any smokeless tobacco history on file. He reports that he does not currently use alcohol. He reports current drug use. Drug: Marijuana.    Family History:  Patient's family history has been reviewed and found to be noncontributory.     Allergies:      Patient has no known allergies.    Home Medications:    Prior to Admission Medications       Prescriptions Last Dose Informant Patient Reported? Taking?    clindamycin (CLEOCIN) 300 MG capsule   No No    Take 1 capsule by mouth 3 (Three) Times a Day.    HYDROcodone-acetaminophen (NORCO) 5-325 MG per tablet   No No    Take 1 tablet by mouth Every 6 (Six) Hours As Needed for Moderate Pain .          ED Medications:    Medications   sodium chloride 0.9 % flush 10 mL (has no administration in time range)   sodium chloride 0.9 % bolus 2,000 mL (0 mL Intravenous Stopped 10/6/23 0227)   ondansetron (ZOFRAN) injection 4 mg (4 mg Intravenous Given 10/5/23 2138)   insulin regular (humuLIN R,novoLIN R) injection 10 Units (10 Units Intravenous Given 10/5/23 2230)   insulin regular (humuLIN R,novoLIN R) injection 10 Units (10 Units Intravenous Given 10/5/23 2328)   acetaminophen (TYLENOL) tablet 975 mg (975 mg Oral Given 10/5/23 2328)   sodium chloride 0.9 % bolus 1,000 mL (1,000 mL Intravenous New Bag 10/6/23 0155)   insulin regular (humuLIN R,novoLIN R) injection 5 Units (5 Units Subcutaneous Given 10/6/23 0316)     Vital Signs:  Temp:  [97.5 °F (36.4 °C)] 97.5 °F (36.4 °C)  Heart Rate:  [] 81  Resp:  [18-22] 18  BP: (125-176)/() 138/90        10/05/23  2013   Weight: 118 kg (260 lb)     Body mass index is 35.26 kg/m².    Physical Exam:     Most recent vital Signs: /90   Pulse 81   Temp 97.5 °F (36.4 °C) (Oral)   Resp 18   Ht 182.9 cm (72\")   Wt 118 kg (260 lb)   SpO2 94%   BMI 35.26 kg/m² "     Physical Exam  Constitutional:       General: He is not in acute distress.     Appearance: He is ill-appearing. He is not toxic-appearing.   HENT:      Mouth/Throat:      Mouth: Mucous membranes are moist.   Eyes:      Extraocular Movements: Extraocular movements intact.   Cardiovascular:      Rate and Rhythm: Normal rate and regular rhythm.      Pulses: Normal pulses.      Heart sounds: Normal heart sounds.   Pulmonary:      Effort: Pulmonary effort is normal.      Breath sounds: Normal breath sounds.   Abdominal:      Palpations: Abdomen is soft.      Tenderness: There is no abdominal tenderness.   Musculoskeletal:      Right lower leg: No edema.      Left lower leg: No edema.   Skin:     General: Skin is warm.   Neurological:      General: No focal deficit present.      Mental Status: He is alert and oriented to person, place, and time.   Psychiatric:         Mood and Affect: Mood normal.         Thought Content: Thought content normal.       Laboratory data:    I have reviewed the labs done in the emergency room.    Results from last 7 days   Lab Units 10/06/23  0350 10/05/23  2056   SODIUM mmol/L 122* 118*   POTASSIUM mmol/L 5.7* 6.0*   CHLORIDE mmol/L 91* 83*   CO2 mmol/L 6.4* 13.9*   BUN mg/dL 20 24*   CREATININE mg/dL 0.63* 1.23   CALCIUM mg/dL 8.9 9.9   BILIRUBIN mg/dL <0.2 0.5   ALK PHOS U/L 59 83   ALT (SGPT) U/L 5 117*   AST (SGOT) U/L 5 107*   GLUCOSE mg/dL 317* 674*     Results from last 7 days   Lab Units 10/06/23  0350   WBC 10*3/mm3 6.30   HEMOGLOBIN g/dL 13.1   HEMATOCRIT % 35.6*   PLATELETS 10*3/mm3 160                             Results from last 7 days   Lab Units 10/05/23  2141   COLOR UA  Yellow   GLUCOSE UA  >=1000 mg/dL (3+)*   KETONES UA  Negative   LEUKOCYTES UA  Negative   PH, URINE  5.5   BILIRUBIN UA  Negative   UROBILINOGEN UA  0.2 E.U./dL       Pain Management Panel  More data may exist         Latest Ref Rng & Units 11/7/2018 10/21/2018   Pain Management Panel   Barbiturates  Screen, Urine Negative Negative     -   Benzodiazepine Screen, Urine Negative Negative     -   Cocaine Screen, Urine Not Detected - Not Detected          Details          This result is from an external source.               EKG:      EKG sinus rhythm, no ST elevations or depressions    Radiology:    No radiology results for the last 3 days    Assessment/Plan:    Observation telemetry admission 10/6/2023 with new onset diabetes, hyponatremia, hyperkalemia, severe triglyceridemia.    At time of admission patient resting comfortably in bed, pleasantly conversational.  He does appear anxious about the diabetes diagnosis.    New onset diabetes  Glucomander subcutaneous insulin protocol.  Diabetes education.    Hyponatremia  IVF.  Monitor response.  Patient was likely significantly dehydrated.    Hyperkalemia  Patient requiring significant amounts of insulin, will continue to be temporized with insulin.    Severe triglyceridemia  Hyperlipidemia  Fenofibrate.  Atorvastatin.  Possible that severe triglyceridemia could harm his pancreas and contribute to diabetes.    Reported history of hepatitis C  Hepatitis panel.    Chronic:  past medical history of IVDU including heroin, asthma, hepatitis C, history of discitis and osteomyelitis of lumbar spine with psoas abscess, chronic back pain tobacco use disorder, foraminal stenosis of lumbar region, lumbar radiculopathy history of MRSA skin infections requiring I&D's.    Risk Assessment: High  DVT Prophylaxis: Lovenox  Code Status: Full code  Diet: Heart healthy/carbohydrate controlled    Advance Care Planning   ACP discussion was held with the patient during this visit. Patient does not have an advance directive, information provided.           Brian Joseph Kerley, DO  10/06/23  04:38 EDT    Dictated utilizing Dragon dictation.    Electronically signed by Kerley, Brian Joseph, DO at 10/06/23 0519       Vital Signs (last day) before discharge       Date/Time Temp Temp src Pulse  Resp BP Patient Position SpO2    10/07/23 1300 -- -- 80 17 127/90 Lying 95    10/07/23 1259 98 (36.7) Temporal 74 -- -- -- --    10/07/23 0900 97.8 (36.6) Temporal 64 -- -- -- 95    10/07/23 0400 97.5 (36.4) Oral -- -- -- -- --    10/07/23 0000 97.5 (36.4) Oral 64 -- 110/76 -- 93    10/06/23 2300 -- -- 66 -- 125/87 -- 94    10/06/23 2000 -- -- 62 -- 126/91 -- 96    10/06/23 1900 97.5 (36.4) Oral 60 -- 136/95 -- 98    10/06/23 1800 -- -- 64 -- 125/89 -- 95    10/06/23 1700 -- -- 68 -- 125/69 -- 95    10/06/23 1604 97.1 (36.2) Temporal 68 -- -- -- 93    10/06/23 1600 -- -- 75 18 131/93 Lying 94    10/06/23 1500 -- -- 66 -- 119/90 -- 94    10/06/23 1400 -- -- 67 -- 136/91 -- 94    10/06/23 1300 -- -- 70 -- 144/101 -- 93    10/06/23 1200 -- -- 73 18 140/96 Sitting 96    10/06/23 1140 -- -- 75 -- -- -- 94    10/06/23 1136 97.6 (36.4) Temporal 73 17 157/100 Lying 94    10/06/23 1121 97.7 (36.5) Oral 70 18 148/90 Lying 93    10/06/23 1100 -- -- 72 -- -- -- 92    10/06/23 0723 97.8 (36.6) Oral 77 18 130/85 Lying 91    10/06/23 0626 -- -- 80 -- 139/96 -- 94    10/06/23 0548 97.6 (36.4) Oral 73 18 139/101 Lying 94    10/06/23 0430 -- -- 82 -- 125/85 -- 92    10/06/23 0400 -- -- 81 -- 138/90 -- 94    10/06/23 0330 -- -- 85 18 137/92 -- 93    10/06/23 0230 -- -- 84 -- 125/88 -- 90    10/06/23 0229 -- -- 82 -- -- -- 90    10/06/23 0228 -- -- 85 -- -- -- 90    10/06/23 0200 -- -- 85 -- 139/92 -- 93    10/06/23 0130 -- -- 89 -- 138/93 -- 91    10/06/23 0030 -- -- 98 19 147/99 -- 94          No current facility-administered medications for this encounter.     Current Outpatient Medications   Medication Sig Dispense Refill    atorvastatin (LIPITOR) 40 MG tablet Take 1 tablet by mouth Daily. 90 tablet 2    fenofibrate (TRICOR) 48 MG tablet Take 1 tablet by mouth Daily for 30 days. 30 tablet 0    HYDROcodone-acetaminophen (NORCO) 5-325 MG per tablet Take 1 tablet by mouth Every 6 (Six) Hours As Needed for Moderate Pain . 12 tablet 0     insulin aspart (NovoLOG FlexPen) 100 UNIT/ML solution pen-injector sc pen Inject 5 Units under the skin into the appropriate area as directed 3 (Three) Times a Day With Meals for 30 days. 4.5 mL 0    insulin detemir (Levemir FlexPen) 100 UNIT/ML injection Inject 15 Units under the skin into the appropriate area as directed Daily for 30 days. 5 mL 0    metFORMIN (Glucophage) 500 MG tablet Take 1 tablet by mouth Daily With Breakfast for 7 days, THEN 1 tablet 2 (Two) Times a Day With Meals for 7 days, THEN 1 tablet 3 (Three) Times a Day for 7 days, THEN 2 tablets 2 (Two) Times a Day With Meals for 7 days. 70 tablet 0     Lab Results (last 24 hours)       Procedure Component Value Units Date/Time    POC Glucose Once [[id number]]  (Abnormal) Collected: 10/07/23 1731    Specimen: Blood Updated: 10/07/23 1733     Glucose 216 mg/dL      Comment: Serial Number: [id number]:  [id number]       POC Glucose Once [[id number]]  (Abnormal) Collected: 10/07/23 1326    Specimen: Blood Updated: 10/07/23 1706     Glucose 244 mg/dL      Comment: Serial Number: [id number]:  [id number]       Phosphorus [[id number]]  (Abnormal) Collected: 10/07/23 1149    Specimen: Blood Updated: 10/07/23 1230     Phosphorus 1.6 mg/dL     Basic Metabolic Panel [[id number]]  (Abnormal) Collected: 10/07/23 1149    Specimen: Blood Updated: 10/07/23 1211     Glucose 206 mg/dL      BUN 7 mg/dL      Creatinine 0.48 mg/dL      Sodium 133 mmol/L      Potassium 4.4 mmol/L      Comment: Specimen hemolyzed.  Results may be affected.        Chloride 105 mmol/L      CO2 11.7 mmol/L      Calcium 7.6 mg/dL      BUN/Creatinine Ratio 14.6     Anion Gap 16.3 mmol/L      eGFR 124.2 mL/min/1.73     Narrative:      GFR Normal >60  Chronic Kidney Disease <60  Kidney Failure <15      Magnesium [[id number]]  (Normal) Collected: 10/07/23 1149    Specimen: Blood Updated: 10/07/23 1211     Magnesium 1.8 mg/dL     POC Glucose Once [[id number]]  (Abnormal) Collected: 10/07/23 1148     Specimen: Blood Updated: 10/07/23 1150     Glucose 202 mg/dL      Comment: Serial Number: [id number]:  [id number]       POC Glucose Once [[id number]]  (Abnormal) Collected: 10/07/23 1110    Specimen: Blood Updated: 10/07/23 1114     Glucose 224 mg/dL      Comment: Serial Number: [id number]:  [id number]       POC Glucose Once [[id number]]  (Abnormal) Collected: 10/07/23 1005    Specimen: Blood Updated: 10/07/23 1008     Glucose 206 mg/dL      Comment: Serial Number: [id number]:  [id number]       POC Glucose Once [[id number]]  (Normal) Collected: 10/07/23 0847    Specimen: Blood Updated: 10/07/23 1008     Glucose 122 mg/dL      Comment: Serial Number: [id number]:  [id number]       Phosphorus [[id number]]  (Abnormal) Collected: 10/07/23 0740    Specimen: Blood Updated: 10/07/23 0844     Phosphorus 2.2 mg/dL     Basic Metabolic Panel [[id number]]  (Abnormal) Collected: 10/07/23 0740    Specimen: Blood Updated: 10/07/23 0842     Glucose 153 mg/dL      BUN 8 mg/dL      Creatinine 0.55 mg/dL      Sodium 134 mmol/L      Potassium 5.4 mmol/L      Comment: Specimen hemolyzed.  Results may be affected.        Chloride 108 mmol/L      CO2 11.0 mmol/L      Calcium 8.0 mg/dL      BUN/Creatinine Ratio 14.5     Anion Gap 15.0 mmol/L      eGFR 119.2 mL/min/1.73     Narrative:      GFR Normal >60  Chronic Kidney Disease <60  Kidney Failure <15      Magnesium [[id number]]  (Normal) Collected: 10/07/23 0740    Specimen: Blood Updated: 10/07/23 0842     Magnesium 1.9 mg/dL     POC Glucose Once [[id number]]  (Abnormal) Collected: 10/07/23 0745    Specimen: Blood Updated: 10/07/23 0752     Glucose 148 mg/dL      Comment: Serial Number: [id number]:  [id number]       Basic Metabolic Panel [[id number]]  (Abnormal) Collected: 10/07/23 0516    Specimen: Blood Updated: 10/07/23 0708     Glucose 136 mg/dL      BUN 10 mg/dL      Creatinine 0.57 mg/dL      Sodium 135 mmol/L      Potassium 4.1 mmol/L      Comment: Specimen hemolyzed.   Results may be affected.        Chloride 106 mmol/L      CO2 15.0 mmol/L      Calcium 7.8 mg/dL      BUN/Creatinine Ratio 17.5     Anion Gap 14.0 mmol/L      eGFR 118.0 mL/min/1.73     Narrative:      GFR Normal >60  Chronic Kidney Disease <60  Kidney Failure <15      Magnesium [[id number]]  (Normal) Collected: 10/07/23 0516    Specimen: Blood Updated: 10/07/23 0708     Magnesium 2.0 mg/dL     Phosphorus [[id number]]  (Abnormal) Collected: 10/07/23 0516    Specimen: Blood Updated: 10/07/23 0657     Phosphorus 2.3 mg/dL     CBC Auto Differential [[id number]]  (Abnormal) Collected: 10/07/23 0516    Specimen: Blood Updated: 10/07/23 0649     WBC 5.46 10*3/mm3      RBC 4.15 10*6/mm3      Hemoglobin 14.2 g/dL      Hematocrit 38.2 %      MCV 92.0 fL      MCH 34.2 pg      MCHC 37.2 g/dL      RDW 12.4 %      RDW-SD 41.6 fl      MPV 11.5 fL      Platelets 119 10*3/mm3      Neutrophil % 46.5 %      Lymphocyte % 36.8 %      Monocyte % 7.0 %      Eosinophil % 8.4 %      Basophil % 0.9 %      Immature Grans % 0.4 %      Neutrophils, Absolute 2.54 10*3/mm3      Lymphocytes, Absolute 2.01 10*3/mm3      Monocytes, Absolute 0.38 10*3/mm3      Eosinophils, Absolute 0.46 10*3/mm3      Basophils, Absolute 0.05 10*3/mm3      Immature Grans, Absolute 0.02 10*3/mm3      nRBC 0.0 /100 WBC     POC Glucose Once [[id number]]  (Abnormal) Collected: 10/07/23 0638    Specimen: Blood Updated: 10/07/23 0641     Glucose 169 mg/dL      Comment: Serial Number: [id number]:  [id number]       POC Glucose Once [[id number]]  (Abnormal) Collected: 10/07/23 0534    Specimen: Blood Updated: 10/07/23 0641     Glucose 160 mg/dL      Comment: Serial Number: [id number]:  [id number]       POC Glucose Once [[id number]]  (Normal) Collected: 10/07/23 0425    Specimen: Blood Updated: 10/07/23 0428     Glucose 119 mg/dL      Comment: Serial Number: [id number]:  [id number]       POC Glucose Once [[id number]]  (Abnormal) Collected: 10/07/23 0322    Specimen: Blood  Updated: 10/07/23 0324     Glucose 134 mg/dL      Comment: Serial Number: [id number]:  [id number]       POC Glucose Once [[id number]]  (Normal) Collected: 10/07/23 0221    Specimen: Blood Updated: 10/07/23 0246     Glucose 122 mg/dL      Comment: Serial Number: [id number]:  [id number]       POC Glucose Once [[id number]]  (Abnormal) Collected: 10/07/23 0116    Specimen: Blood Updated: 10/07/23 0244     Glucose 145 mg/dL      Comment: Serial Number: [id number]:  [id number]       Magnesium [[id number]]  (Normal) Collected: 10/07/23 0013    Specimen: Blood Updated: 10/07/23 0036     Magnesium 1.9 mg/dL     Basic Metabolic Panel [[id number]]  (Abnormal) Collected: 10/07/23 0013    Specimen: Blood Updated: 10/07/23 0036     Glucose 163 mg/dL      BUN 10 mg/dL      Creatinine 0.59 mg/dL      Sodium 133 mmol/L      Potassium 4.0 mmol/L      Comment: Specimen hemolyzed.  Results may be affected.        Chloride 105 mmol/L      CO2 7.0 mmol/L      Calcium 7.7 mg/dL      BUN/Creatinine Ratio 16.9     Anion Gap 21.0 mmol/L      eGFR 116.7 mL/min/1.73     Narrative:      GFR Normal >60  Chronic Kidney Disease <60  Kidney Failure <15      Phosphorus [[id number]]  (Abnormal) Collected: 10/07/23 0013    Specimen: Blood Updated: 10/07/23 0036     Phosphorus 2.2 mg/dL     POC Glucose Once [[id number]]  (Abnormal) Collected: 10/07/23 0012    Specimen: Blood Updated: 10/07/23 0014     Glucose 166 mg/dL      Comment: Serial Number: [id number]:  [id number]       POC Glucose Once [[id number]]  (Abnormal) Collected: 10/06/23 2307    Specimen: Blood Updated: 10/06/23 2309     Glucose 237 mg/dL      Comment: Serial Number: [id number]:  [id number]       POC Glucose Once [[id number]]  (Abnormal) Collected: 10/06/23 2103    Specimen: Blood Updated: 10/06/23 2307     Glucose 284 mg/dL      Comment: Serial Number: [id number]:  [id number]       POC Glucose Once [[id number]]  (Abnormal) Collected: 10/06/23 2207    Specimen: Blood  Updated: 10/06/23 2205     Glucose 253 mg/dL      Comment: Serial Number: [id number]:  [id number]       Magnesium [[id number]]  (Normal) Collected: 10/06/23 1959    Specimen: Blood Updated: 10/06/23 2131     Magnesium 2.0 mg/dL     Basic Metabolic Panel [[id number]]  (Abnormal) Collected: 10/06/23 1959    Specimen: Blood Updated: 10/06/23 2131     Glucose 202 mg/dL      BUN 13 mg/dL      Creatinine 0.55 mg/dL      Sodium 131 mmol/L      Potassium 4.0 mmol/L      Comment: Specimen hemolyzed.  Results may be affected.        Chloride 102 mmol/L      CO2 11.4 mmol/L      Calcium 8.0 mg/dL      BUN/Creatinine Ratio 23.6     Anion Gap 17.6 mmol/L      eGFR 119.2 mL/min/1.73     Narrative:      GFR Normal >60  Chronic Kidney Disease <60  Kidney Failure <15      Phosphorus [[id number]]  (Abnormal) Collected: 10/06/23 1959    Specimen: Blood Updated: 10/06/23 2123     Phosphorus 2.2 mg/dL     POC Glucose Once [[id number]]  (Abnormal) Collected: 10/06/23 1959    Specimen: Blood Updated: 10/06/23 2003     Glucose 235 mg/dL      Comment: Serial Number: [id number]:  [id number]       Osmolality, Serum [[id number]]  (Abnormal) Collected: 10/06/23 1027    Specimen: Blood Updated: 10/06/23 1906     Osmolality 310 mOsm/kg     POC Glucose Once [[id number]]  (Abnormal) Collected: 10/06/23 1845    Specimen: Blood Updated: 10/06/23 1849     Glucose 222 mg/dL      Comment: Serial Number: [id number]:  [id number]       LDL Cholesterol, Direct [[id number]]  (Normal) Collected: 10/05/23 2056    Specimen: Blood Updated: 10/06/23 1838     LDL Cholesterol  17 mg/dL     Narrative:      LDL Reference Ranges    (U.S. Department of Health and Human Services ATP III Classifications)    Optimal          <100 mg/dl  Near Optimal     100-129 mg/dl  Borderline High  130-159 mg/dl  High             160-189 mg/dl  Very High        >189 mg/dl      POC Glucose Once [[id number]]  (Abnormal) Collected: 10/06/23 1812    Specimen: Blood Updated: 10/06/23  1819     Glucose 255 mg/dL      Comment: Serial Number: [id number]:  [id number]       POC Glucose Once [[id number]]  (Abnormal) Collected: 10/06/23 1705    Specimen: Blood Updated: 10/06/23 1819     Glucose 388 mg/dL      Comment: Serial Number: [id number]:  [id number]             Imaging Results (Last 24 Hours)       ** No results found for the last 24 hours. **          Orders (last 24 hrs)        Start     Ordered    10/08/23 0000  atorvastatin (LIPITOR) 40 MG tablet  Daily         10/07/23 1452    10/08/23 0000  fenofibrate (TRICOR) 48 MG tablet  Daily         10/07/23 1452    10/07/23 1100  POC Glucose 4x Daily Before Meals & at Bedtime  4 Times Daily Before Meals & at Bedtime,   Status:  Canceled      Comments: Complete no more than 45 minutes prior to patient eating      10/07/23 0842    10/07/23 0000  insulin detemir (Levemir FlexPen) 100 UNIT/ML injection  Daily         10/07/23 1452    10/07/23 0000  insulin aspart (NovoLOG FlexPen) 100 UNIT/ML solution pen-injector sc pen  3 Times Daily With Meals         10/07/23 1452    10/07/23 0000  metFORMIN (Glucophage) 500 MG tablet  Multiple Frequencies         10/07/23 1631    10/07/23 0000  Discharge Follow-up with PCP         10/07/23 1631    10/07/23 0000  Discharge Instructions        Comments: Please take all medications as prescribed.  If you have any questions or concerns contact your primary care physician.  If you have any new or worsening symptoms return to the ER.  Check your blood sugar before meals and at bedtime. Take 15 units long acting levemir once a day. Take 5 units novolog fast acting insulin before your meals three times a day.  Take metformin as directed.  Keep a log of your blood sugars for when you follow up with your primary care.    10/07/23 1631    10/07/23 0000  Advance Diet As Tolerated -Target Diet: diabetic         10/07/23 1631    10/06/23 0700  POC Glucose 4x Daily Before Meals & at Bedtime  4 Times Daily Before Meals & at  Bedtime,   Status:  Canceled      Comments: Complete no more than 45 minutes prior to patient eating      10/06/23 0540    --  SCANNED - TELEMETRY           10/05/23 0000    --  SCANNED - TELEMETRY           10/05/23 0000    --  SCANNED - TELEMETRY           10/05/23 0000    --  SCANNED - TELEMETRY           10/05/23 0000                  Physician Progress Notes (last 24 hours)  Notes from 10/08/23 0910 through 10/09/23 0910   No notes of this type exist for this encounter.       Consult Notes (last 24 hours)  Notes from 10/08/23 0910 through 10/09/23 0910   No notes of this type exist for this encounter.

## 2023-10-10 NOTE — PAYOR COMM NOTE
"To:  [facility]  From: Sabrina Marie RN  Phone: [phone]  Fax: [phone]  NPI: [id number]  TIN: [id number]  Member ID: [id number]  MRN: [id number]    Tracey Ivory (53 y.o. Male)       Date of Birth   1970    Social Security Number       Address   [address] [address]    Home Phone   [phone]    MRN   [id number]       [employer]   Congregation    Marital Status   Legally                             Admission Date   10/5/23    Admission Type   Emergency    Admitting Provider   Carrillo Dempsey DO    Attending Provider       Department, Room/Bed   [facility] INTENSIVE CARE, I06/       Discharge Date   10/7/2023    Discharge Disposition   Home or Self Care    Discharge Destination   Home                              Attending Provider: (none)   Allergies: No Known Allergies    Isolation: None   Infection: None   Code Status: Prior    Ht: 182.9 cm (72\")   Wt: 117 kg (257 lb 0.9 oz)    Admission Cmt: None   Principal Problem: New onset type 2 diabetes mellitus [E11.9]                   Active Insurance as of 10/5/2023       Primary Coverage       Payor Plan Insurance Group Employer/Plan Group    [facility] COMMUNITY PLAN [facility] COMMUNITY PLAN [facility]       Payor Plan Address Payor Plan Phone Number Payor Plan Fax Number Effective Dates    [address]   10/1/2023 - None Entered    [facility] [id number]         Subscriber Name Subscriber Birth Date Member ID       TRACEY IVORY 1970 [id number]                     Emergency Contacts        (Rel.) Home Phone Work Phone Mobile Phone    ELVISFREDERICK (Friend) -- -- [phone]                 History & Physical        Kerley, Brian Joseph, DO at 10/06/23 0438            [facility] HOSPITALIST   HISTORY AND PHYSICAL      Name:  Tracey Ivory   Age:  53 y.o.  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  Presented to [facility] ED on 10/5/2023 with concern for hyperglycemia.  Denied any history of diabetes.  He said that for the past week he has felt real sick; describes dry mouth, increased thirst, increased urination, increased hunger.  Says blood sugar was checked today and it was over 500.  Says he has not done any intravenous drug use in many years.  Says that he has never had any treatment for hepatitis C.    ED summary: Afebrile, tachycardic which improved, hypertensive which improved, otherwise vital signs stable on room air.  EKG sinus rhythm, no ST elevations or depressions.  Sodium initially 118, improved to 122.  Potassium 6.0, improved to 5.7.  Creatinine 1.23 improved of 0.63.  Blood glucose 674, improved to 315.  Transaminitis resolved.  Total cholesterol 553, HDL 9, triglycerides over 4400.  Glucosuria on urinalysis.  Type 2 diabetes without DKA, new onset.  Severe triglyceridemia.  Hyponatremia.  He was provided Tylenol, insulin, Zofran, 3 L normal saline bolus.  He was treated in the ED for over 8 hours without significant improvement with blood glucose and sodium.  Hospital service consulted for observation admission given new onset diabetes requiring education, and hyponatremia.    Review Of Systems:    All systems were reviewed and negative except as mentioned in history of presenting illness, assessment and plan.    Past Medical History: Patient  has a past medical history of Back injury and Hypertension.    Past Surgical History: Patient  has a past surgical history that  "includes Back surgery (2017).    Social History: Patient  reports that he has been smoking. He has been smoking an average of .5 packs per day. He does not have any smokeless tobacco history on file. He reports that he does not currently use alcohol. He reports current drug use. Drug: Marijuana.    Family History:  Patient's family history has been reviewed and found to be noncontributory.     Allergies:      Patient has no known allergies.    Home Medications:    Prior to Admission Medications       Prescriptions Last Dose Informant Patient Reported? Taking?    clindamycin (CLEOCIN) 300 MG capsule   No No    Take 1 capsule by mouth 3 (Three) Times a Day.    HYDROcodone-acetaminophen (NORCO) 5-325 MG per tablet   No No    Take 1 tablet by mouth Every 6 (Six) Hours As Needed for Moderate Pain .          ED Medications:    Medications   sodium chloride 0.9 % flush 10 mL (has no administration in time range)   sodium chloride 0.9 % bolus 2,000 mL (0 mL Intravenous Stopped 10/6/23 0227)   ondansetron (ZOFRAN) injection 4 mg (4 mg Intravenous Given 10/5/23 2138)   insulin regular (humuLIN R,novoLIN R) injection 10 Units (10 Units Intravenous Given 10/5/23 2230)   insulin regular (humuLIN R,novoLIN R) injection 10 Units (10 Units Intravenous Given 10/5/23 2328)   acetaminophen (TYLENOL) tablet 975 mg (975 mg Oral Given 10/5/23 2328)   sodium chloride 0.9 % bolus 1,000 mL (1,000 mL Intravenous New Bag 10/6/23 0155)   insulin regular (humuLIN R,novoLIN R) injection 5 Units (5 Units Subcutaneous Given 10/6/23 0316)     Vital Signs:  Temp:  [97.5 °F (36.4 °C)] 97.5 °F (36.4 °C)  Heart Rate:  [] 81  Resp:  [18-22] 18  BP: (125-176)/() 138/90        10/05/23  2013   Weight: 118 kg (260 lb)     Body mass index is 35.26 kg/m².    Physical Exam:     Most recent vital Signs: /90   Pulse 81   Temp 97.5 °F (36.4 °C) (Oral)   Resp 18   Ht 182.9 cm (72\")   Wt 118 kg (260 lb)   SpO2 94%   BMI 35.26 kg/m² "     Physical Exam  Constitutional:       General: He is not in acute distress.     Appearance: He is ill-appearing. He is not toxic-appearing.   HENT:      Mouth/Throat:      Mouth: Mucous membranes are moist.   Eyes:      Extraocular Movements: Extraocular movements intact.   Cardiovascular:      Rate and Rhythm: Normal rate and regular rhythm.      Pulses: Normal pulses.      Heart sounds: Normal heart sounds.   Pulmonary:      Effort: Pulmonary effort is normal.      Breath sounds: Normal breath sounds.   Abdominal:      Palpations: Abdomen is soft.      Tenderness: There is no abdominal tenderness.   Musculoskeletal:      Right lower leg: No edema.      Left lower leg: No edema.   Skin:     General: Skin is warm.   Neurological:      General: No focal deficit present.      Mental Status: He is alert and oriented to person, place, and time.   Psychiatric:         Mood and Affect: Mood normal.         Thought Content: Thought content normal.       Laboratory data:    I have reviewed the labs done in the emergency room.    Results from last 7 days   Lab Units 10/06/23  0350 10/05/23  2056   SODIUM mmol/L 122* 118*   POTASSIUM mmol/L 5.7* 6.0*   CHLORIDE mmol/L 91* 83*   CO2 mmol/L 6.4* 13.9*   BUN mg/dL 20 24*   CREATININE mg/dL 0.63* 1.23   CALCIUM mg/dL 8.9 9.9   BILIRUBIN mg/dL <0.2 0.5   ALK PHOS U/L 59 83   ALT (SGPT) U/L 5 117*   AST (SGOT) U/L 5 107*   GLUCOSE mg/dL 317* 674*     Results from last 7 days   Lab Units 10/06/23  0350   WBC 10*3/mm3 6.30   HEMOGLOBIN g/dL 13.1   HEMATOCRIT % 35.6*   PLATELETS 10*3/mm3 160                             Results from last 7 days   Lab Units 10/05/23  2141   COLOR UA  Yellow   GLUCOSE UA  >=1000 mg/dL (3+)*   KETONES UA  Negative   LEUKOCYTES UA  Negative   PH, URINE  5.5   BILIRUBIN UA  Negative   UROBILINOGEN UA  0.2 E.U./dL       Pain Management Panel  More data may exist         Latest Ref Rng & Units 11/7/2018 10/21/2018   Pain Management Panel   Barbiturates  Screen, Urine Negative Negative     -   Benzodiazepine Screen, Urine Negative Negative     -   Cocaine Screen, Urine Not Detected - Not Detected          Details          This result is from an external source.               EKG:      EKG sinus rhythm, no ST elevations or depressions    Radiology:    No radiology results for the last 3 days    Assessment/Plan:    Observation telemetry admission 10/6/2023 with new onset diabetes, hyponatremia, hyperkalemia, severe triglyceridemia.    At time of admission patient resting comfortably in bed, pleasantly conversational.  He does appear anxious about the diabetes diagnosis.    New onset diabetes  Glucomander subcutaneous insulin protocol.  Diabetes education.    Hyponatremia  IVF.  Monitor response.  Patient was likely significantly dehydrated.    Hyperkalemia  Patient requiring significant amounts of insulin, will continue to be temporized with insulin.    Severe triglyceridemia  Hyperlipidemia  Fenofibrate.  Atorvastatin.  Possible that severe triglyceridemia could harm his pancreas and contribute to diabetes.    Reported history of hepatitis C  Hepatitis panel.    Chronic:  past medical history of IVDU including heroin, asthma, hepatitis C, history of discitis and osteomyelitis of lumbar spine with psoas abscess, chronic back pain tobacco use disorder, foraminal stenosis of lumbar region, lumbar radiculopathy history of MRSA skin infections requiring I&D's.    Risk Assessment: High  DVT Prophylaxis: Lovenox  Code Status: Full code  Diet: Heart healthy/carbohydrate controlled    Advance Care Planning   ACP discussion was held with the patient during this visit. Patient does not have an advance directive, information provided.           Brian Joseph Kerley, DO  10/06/23  04:38 EDT    Dictated utilizing Dragon dictation.    Electronically signed by Kerley, Brian Joseph, DO at 10/06/23 0519       Vital Signs (last day) before discharge       Date/Time Temp Temp src Pulse  Resp BP Patient Position SpO2    10/07/23 1300 -- -- 80 17 127/90 Lying 95    10/07/23 1259 98 (36.7) Temporal 74 -- -- -- --    10/07/23 0900 97.8 (36.6) Temporal 64 -- -- -- 95    10/07/23 0400 97.5 (36.4) Oral -- -- -- -- --    10/07/23 0000 97.5 (36.4) Oral 64 -- 110/76 -- 93    10/06/23 2300 -- -- 66 -- 125/87 -- 94    10/06/23 2000 -- -- 62 -- 126/91 -- 96    10/06/23 1900 97.5 (36.4) Oral 60 -- 136/95 -- 98    10/06/23 1800 -- -- 64 -- 125/89 -- 95    10/06/23 1700 -- -- 68 -- 125/69 -- 95    10/06/23 1604 97.1 (36.2) Temporal 68 -- -- -- 93    10/06/23 1600 -- -- 75 18 131/93 Lying 94    10/06/23 1500 -- -- 66 -- 119/90 -- 94    10/06/23 1400 -- -- 67 -- 136/91 -- 94    10/06/23 1300 -- -- 70 -- 144/101 -- 93    10/06/23 1200 -- -- 73 18 140/96 Sitting 96    10/06/23 1140 -- -- 75 -- -- -- 94    10/06/23 1136 97.6 (36.4) Temporal 73 17 157/100 Lying 94    10/06/23 1121 97.7 (36.5) Oral 70 18 148/90 Lying 93    10/06/23 1100 -- -- 72 -- -- -- 92    10/06/23 0723 97.8 (36.6) Oral 77 18 130/85 Lying 91    10/06/23 0626 -- -- 80 -- 139/96 -- 94    10/06/23 0548 97.6 (36.4) Oral 73 18 139/101 Lying 94    10/06/23 0430 -- -- 82 -- 125/85 -- 92    10/06/23 0400 -- -- 81 -- 138/90 -- 94    10/06/23 0330 -- -- 85 18 137/92 -- 93    10/06/23 0230 -- -- 84 -- 125/88 -- 90    10/06/23 0229 -- -- 82 -- -- -- 90    10/06/23 0228 -- -- 85 -- -- -- 90    10/06/23 0200 -- -- 85 -- 139/92 -- 93    10/06/23 0130 -- -- 89 -- 138/93 -- 91    10/06/23 0030 -- -- 98 19 147/99 -- 94          No current facility-administered medications for this encounter.     Current Outpatient Medications   Medication Sig Dispense Refill    atorvastatin (LIPITOR) 40 MG tablet Take 1 tablet by mouth Daily. 90 tablet 2    fenofibrate (TRICOR) 48 MG tablet Take 1 tablet by mouth Daily for 30 days. 30 tablet 0    HYDROcodone-acetaminophen (NORCO) 5-325 MG per tablet Take 1 tablet by mouth Every 6 (Six) Hours As Needed for Moderate Pain . 12 tablet 0     insulin aspart (NovoLOG FlexPen) 100 UNIT/ML solution pen-injector sc pen Inject 5 Units under the skin into the appropriate area as directed 3 (Three) Times a Day With Meals for 30 days. 4.5 mL 0    insulin detemir (Levemir FlexPen) 100 UNIT/ML injection Inject 15 Units under the skin into the appropriate area as directed Daily for 30 days. 5 mL 0    metFORMIN (Glucophage) 500 MG tablet Take 1 tablet by mouth Daily With Breakfast for 7 days, THEN 1 tablet 2 (Two) Times a Day With Meals for 7 days, THEN 1 tablet 3 (Three) Times a Day for 7 days, THEN 2 tablets 2 (Two) Times a Day With Meals for 7 days. 70 tablet 0     Lab Results (last 24 hours)       Procedure Component Value Units Date/Time    POC Glucose Once [[id number]]  (Abnormal) Collected: 10/07/23 1731    Specimen: Blood Updated: 10/07/23 1733     Glucose 216 mg/dL      Comment: Serial Number: [id number]:  [id number]       POC Glucose Once [[id number]]  (Abnormal) Collected: 10/07/23 1326    Specimen: Blood Updated: 10/07/23 1706     Glucose 244 mg/dL      Comment: Serial Number: [id number]:  [id number]       Phosphorus [[id number]]  (Abnormal) Collected: 10/07/23 1149    Specimen: Blood Updated: 10/07/23 1230     Phosphorus 1.6 mg/dL     Basic Metabolic Panel [[id number]]  (Abnormal) Collected: 10/07/23 1149    Specimen: Blood Updated: 10/07/23 1211     Glucose 206 mg/dL      BUN 7 mg/dL      Creatinine 0.48 mg/dL      Sodium 133 mmol/L      Potassium 4.4 mmol/L      Comment: Specimen hemolyzed.  Results may be affected.        Chloride 105 mmol/L      CO2 11.7 mmol/L      Calcium 7.6 mg/dL      BUN/Creatinine Ratio 14.6     Anion Gap 16.3 mmol/L      eGFR 124.2 mL/min/1.73     Narrative:      GFR Normal >60  Chronic Kidney Disease <60  Kidney Failure <15      Magnesium [[id number]]  (Normal) Collected: 10/07/23 1149    Specimen: Blood Updated: 10/07/23 1211     Magnesium 1.8 mg/dL     POC Glucose Once [[id number]]  (Abnormal) Collected: 10/07/23 1148     Specimen: Blood Updated: 10/07/23 1150     Glucose 202 mg/dL      Comment: Serial Number: [id number]:  [id number]       POC Glucose Once [[id number]]  (Abnormal) Collected: 10/07/23 1110    Specimen: Blood Updated: 10/07/23 1114     Glucose 224 mg/dL      Comment: Serial Number: [id number]:  [id number]       POC Glucose Once [[id number]]  (Abnormal) Collected: 10/07/23 1005    Specimen: Blood Updated: 10/07/23 1008     Glucose 206 mg/dL      Comment: Serial Number: [id number]:  [id number]       POC Glucose Once [[id number]]  (Normal) Collected: 10/07/23 0847    Specimen: Blood Updated: 10/07/23 1008     Glucose 122 mg/dL      Comment: Serial Number: [id number]:  [id number]       Phosphorus [[id number]]  (Abnormal) Collected: 10/07/23 0740    Specimen: Blood Updated: 10/07/23 0844     Phosphorus 2.2 mg/dL     Basic Metabolic Panel [[id number]]  (Abnormal) Collected: 10/07/23 0740    Specimen: Blood Updated: 10/07/23 0842     Glucose 153 mg/dL      BUN 8 mg/dL      Creatinine 0.55 mg/dL      Sodium 134 mmol/L      Potassium 5.4 mmol/L      Comment: Specimen hemolyzed.  Results may be affected.        Chloride 108 mmol/L      CO2 11.0 mmol/L      Calcium 8.0 mg/dL      BUN/Creatinine Ratio 14.5     Anion Gap 15.0 mmol/L      eGFR 119.2 mL/min/1.73     Narrative:      GFR Normal >60  Chronic Kidney Disease <60  Kidney Failure <15      Magnesium [[id number]]  (Normal) Collected: 10/07/23 0740    Specimen: Blood Updated: 10/07/23 0842     Magnesium 1.9 mg/dL     POC Glucose Once [[id number]]  (Abnormal) Collected: 10/07/23 0745    Specimen: Blood Updated: 10/07/23 0752     Glucose 148 mg/dL      Comment: Serial Number: [id number]:  [id number]       Basic Metabolic Panel [[id number]]  (Abnormal) Collected: 10/07/23 0516    Specimen: Blood Updated: 10/07/23 0708     Glucose 136 mg/dL      BUN 10 mg/dL      Creatinine 0.57 mg/dL      Sodium 135 mmol/L      Potassium 4.1 mmol/L      Comment: Specimen hemolyzed.   Results may be affected.        Chloride 106 mmol/L      CO2 15.0 mmol/L      Calcium 7.8 mg/dL      BUN/Creatinine Ratio 17.5     Anion Gap 14.0 mmol/L      eGFR 118.0 mL/min/1.73     Narrative:      GFR Normal >60  Chronic Kidney Disease <60  Kidney Failure <15      Magnesium [[id number]]  (Normal) Collected: 10/07/23 0516    Specimen: Blood Updated: 10/07/23 0708     Magnesium 2.0 mg/dL     Phosphorus [[id number]]  (Abnormal) Collected: 10/07/23 0516    Specimen: Blood Updated: 10/07/23 0657     Phosphorus 2.3 mg/dL     CBC Auto Differential [[id number]]  (Abnormal) Collected: 10/07/23 0516    Specimen: Blood Updated: 10/07/23 0649     WBC 5.46 10*3/mm3      RBC 4.15 10*6/mm3      Hemoglobin 14.2 g/dL      Hematocrit 38.2 %      MCV 92.0 fL      MCH 34.2 pg      MCHC 37.2 g/dL      RDW 12.4 %      RDW-SD 41.6 fl      MPV 11.5 fL      Platelets 119 10*3/mm3      Neutrophil % 46.5 %      Lymphocyte % 36.8 %      Monocyte % 7.0 %      Eosinophil % 8.4 %      Basophil % 0.9 %      Immature Grans % 0.4 %      Neutrophils, Absolute 2.54 10*3/mm3      Lymphocytes, Absolute 2.01 10*3/mm3      Monocytes, Absolute 0.38 10*3/mm3      Eosinophils, Absolute 0.46 10*3/mm3      Basophils, Absolute 0.05 10*3/mm3      Immature Grans, Absolute 0.02 10*3/mm3      nRBC 0.0 /100 WBC     POC Glucose Once [[id number]]  (Abnormal) Collected: 10/07/23 0638    Specimen: Blood Updated: 10/07/23 0641     Glucose 169 mg/dL      Comment: Serial Number: [id number]:  [id number]       POC Glucose Once [[id number]]  (Abnormal) Collected: 10/07/23 0534    Specimen: Blood Updated: 10/07/23 0641     Glucose 160 mg/dL      Comment: Serial Number: [id number]:  [id number]       POC Glucose Once [[id number]]  (Normal) Collected: 10/07/23 0425    Specimen: Blood Updated: 10/07/23 0428     Glucose 119 mg/dL      Comment: Serial Number: [id number]:  [id number]       POC Glucose Once [[id number]]  (Abnormal) Collected: 10/07/23 0322    Specimen: Blood  Updated: 10/07/23 0324     Glucose 134 mg/dL      Comment: Serial Number: [id number]:  [id number]       POC Glucose Once [[id number]]  (Normal) Collected: 10/07/23 0221    Specimen: Blood Updated: 10/07/23 0246     Glucose 122 mg/dL      Comment: Serial Number: [id number]:  [id number]       POC Glucose Once [[id number]]  (Abnormal) Collected: 10/07/23 0116    Specimen: Blood Updated: 10/07/23 0244     Glucose 145 mg/dL      Comment: Serial Number: [id number]:  [id number]       Magnesium [[id number]]  (Normal) Collected: 10/07/23 0013    Specimen: Blood Updated: 10/07/23 0036     Magnesium 1.9 mg/dL     Basic Metabolic Panel [[id number]]  (Abnormal) Collected: 10/07/23 0013    Specimen: Blood Updated: 10/07/23 0036     Glucose 163 mg/dL      BUN 10 mg/dL      Creatinine 0.59 mg/dL      Sodium 133 mmol/L      Potassium 4.0 mmol/L      Comment: Specimen hemolyzed.  Results may be affected.        Chloride 105 mmol/L      CO2 7.0 mmol/L      Calcium 7.7 mg/dL      BUN/Creatinine Ratio 16.9     Anion Gap 21.0 mmol/L      eGFR 116.7 mL/min/1.73     Narrative:      GFR Normal >60  Chronic Kidney Disease <60  Kidney Failure <15      Phosphorus [[id number]]  (Abnormal) Collected: 10/07/23 0013    Specimen: Blood Updated: 10/07/23 0036     Phosphorus 2.2 mg/dL     POC Glucose Once [[id number]]  (Abnormal) Collected: 10/07/23 0012    Specimen: Blood Updated: 10/07/23 0014     Glucose 166 mg/dL      Comment: Serial Number: [id number]:  [id number]       POC Glucose Once [[id number]]  (Abnormal) Collected: 10/06/23 2307    Specimen: Blood Updated: 10/06/23 2309     Glucose 237 mg/dL      Comment: Serial Number: [id number]:  [id number]       POC Glucose Once [[id number]]  (Abnormal) Collected: 10/06/23 2103    Specimen: Blood Updated: 10/06/23 2307     Glucose 284 mg/dL      Comment: Serial Number: [id number]:  [id number]       POC Glucose Once [[id number]]  (Abnormal) Collected: 10/06/23 2206    Specimen: Blood  Updated: 10/06/23 2205     Glucose 253 mg/dL      Comment: Serial Number: [id number]:  [id number]       Magnesium [[id number]]  (Normal) Collected: 10/06/23 1959    Specimen: Blood Updated: 10/06/23 2131     Magnesium 2.0 mg/dL     Basic Metabolic Panel [[id number]]  (Abnormal) Collected: 10/06/23 1959    Specimen: Blood Updated: 10/06/23 2131     Glucose 202 mg/dL      BUN 13 mg/dL      Creatinine 0.55 mg/dL      Sodium 131 mmol/L      Potassium 4.0 mmol/L      Comment: Specimen hemolyzed.  Results may be affected.        Chloride 102 mmol/L      CO2 11.4 mmol/L      Calcium 8.0 mg/dL      BUN/Creatinine Ratio 23.6     Anion Gap 17.6 mmol/L      eGFR 119.2 mL/min/1.73     Narrative:      GFR Normal >60  Chronic Kidney Disease <60  Kidney Failure <15      Phosphorus [[id number]]  (Abnormal) Collected: 10/06/23 1959    Specimen: Blood Updated: 10/06/23 2123     Phosphorus 2.2 mg/dL     POC Glucose Once [[id number]]  (Abnormal) Collected: 10/06/23 1959    Specimen: Blood Updated: 10/06/23 2003     Glucose 235 mg/dL      Comment: Serial Number: [id number]:  [id number]       Osmolality, Serum [[id number]]  (Abnormal) Collected: 10/06/23 1027    Specimen: Blood Updated: 10/06/23 1906     Osmolality 310 mOsm/kg     POC Glucose Once [[id number]]  (Abnormal) Collected: 10/06/23 1845    Specimen: Blood Updated: 10/06/23 1849     Glucose 222 mg/dL      Comment: Serial Number: [id number]:  [id number]       LDL Cholesterol, Direct [[id number]]  (Normal) Collected: 10/05/23 2056    Specimen: Blood Updated: 10/06/23 1838     LDL Cholesterol  17 mg/dL     Narrative:      LDL Reference Ranges    (U.S. Department of Health and Human Services ATP III Classifications)    Optimal          <100 mg/dl  Near Optimal     100-129 mg/dl  Borderline High  130-159 mg/dl  High             160-189 mg/dl  Very High        >189 mg/dl      POC Glucose Once [[id number]]  (Abnormal) Collected: 10/06/23 1812    Specimen: Blood Updated: 10/06/23  1819     Glucose 255 mg/dL      Comment: Serial Number: [id number]:  [id number]       POC Glucose Once [[id number]]  (Abnormal) Collected: 10/06/23 1705    Specimen: Blood Updated: 10/06/23 1819     Glucose 388 mg/dL      Comment: Serial Number: [id number]:  [id number]             Imaging Results (Last 24 Hours)       ** No results found for the last 24 hours. **          Orders (last 24 hrs)        Start     Ordered    10/08/23 0000  atorvastatin (LIPITOR) 40 MG tablet  Daily         10/07/23 1452    10/08/23 0000  fenofibrate (TRICOR) 48 MG tablet  Daily         10/07/23 1452    10/07/23 0000  insulin detemir (Levemir FlexPen) 100 UNIT/ML injection  Daily         10/07/23 1452    10/07/23 0000  insulin aspart (NovoLOG FlexPen) 100 UNIT/ML solution pen-injector sc pen  3 Times Daily With Meals         10/07/23 1452    10/07/23 0000  metFORMIN (Glucophage) 500 MG tablet  Multiple Frequencies         10/07/23 1631    10/07/23 0000  Discharge Follow-up with PCP         10/07/23 1631    10/07/23 0000  Discharge Instructions        Comments: Please take all medications as prescribed.  If you have any questions or concerns contact your primary care physician.  If you have any new or worsening symptoms return to the ER.  Check your blood sugar before meals and at bedtime. Take 15 units long acting levemir once a day. Take 5 units novolog fast acting insulin before your meals three times a day.  Take metformin as directed.  Keep a log of your blood sugars for when you follow up with your primary care.    10/07/23 1631    10/07/23 0000  Advance Diet As Tolerated -Target Diet: diabetic         10/07/23 1631    --  SCANNED - TELEMETRY           10/05/23 0000    --  SCANNED - TELEMETRY           10/05/23 0000    --  SCANNED - TELEMETRY           10/05/23 0000    --  SCANNED - TELEMETRY           10/05/23 0000                  Physician Progress Notes (last 24 hours)  Notes from 10/09/23 0716 through 10/10/23 0716   No  notes of this type exist for this encounter.       Consult Notes (last 24 hours)  Notes from 10/09/23 0716 through 10/10/23 0716   No notes of this type exist for this encounter.          Discharge Summary        Carrillo Dempsey,  at 10/07/23 1632              [facility]   DISCHARGE SUMMARY      Name:  Albin Ivory   Age:  53 y.o.  Sex:  male  :  1970  MRN:  [id number]   Visit Number:  [id number]    Admission Date:  10/5/2023  Date of Discharge:  10/7/2023  Primary Care Physician:  Provider, No Known    Important issues to note:    Start: Levemir and NovoLog prescription was also sent for glucometer.  Patient was given the pens not the vials.  Was also sent a prescription for Lipitor and Tricor.  Stop: Nothing  Follow up: PCP  Brief Summary: Presented as he had been feeling dehydrated and had his blood sugar checked and it was over 500 due to previously undiagnosed diabetes. Initially had shown signs of DKA with a high anion gap which improved with IV insulin and IV fluids.  Also had very high triglycerides which likely also benefited from the IV insulin and IV fluids.  Was started on Tricor and Lipitor for home.  Recommend close follow-up of his triglycerides outpatient.  Patient was ambulating not having any acute distress other than a headache at the time of discharge.  Prescriptions were sent for insulin and glucometer and medications for cholesterol.  If his triglycerides are coming down outpatient likely he could come off the Tricor.  He also has other chronic issues including history of hepatitis C for which she would benefit for follow-up with her primary care doctor.    Discharge Diagnoses:       New onset type 2 diabetes mellitus    DKA (diabetic ketoacidosis)        Problem List:     Active Hospital Problems    Diagnosis  POA    **New onset type 2 diabetes mellitus [E11.9]  Yes    DKA (diabetic ketoacidosis) [E11.10]  Yes      Resolved Hospital Problems   No resolved  problems to display.     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  Presented to [facility] on 10/5/2023 with concern for hyperglycemia.  Denied any history of diabetes.  He said that for the past week he has felt real sick; describes dry mouth, increased thirst, increased urination, increased hunger.  Says blood sugar was checked today and it was over 500.  Says he has not done any intravenous drug use in many years.  Says that he has never had any treatment for hepatitis C.     ED summary: Afebrile, tachycardic which improved, hypertensive which improved, otherwise vital signs stable on room air.  EKG sinus rhythm, no ST elevations or depressions.  Sodium initially 118, improved to 122.  Potassium 6.0, improved to 5.7.  Creatinine 1.23 improved of 0.63.  Blood glucose 674, improved to 315.  Transaminitis resolved.  Total cholesterol 553, HDL 9, triglycerides over 4400.  Glucosuria on urinalysis.  Type 2 diabetes without DKA, new onset.  Severe triglyceridemia.  Hyponatremia.  He was provided Tylenol, insulin, Zofran, 3 L normal saline bolus.  He was treated in the ED for over 8 hours without significant improvement with blood glucose and sodium.  Hospital service consulted for observation admission given new onset diabetes requiring education, and hyponatremia.      Hospital course:   New onset diabetes/DKA  Hyperkalemia  Hyponatremia  Hypertriglyceridemia    -Patient was admitted and given DKA protocol with IV  fluids and IV insulin anion gap closed.  Had a high potassium which resolved.  Was transitioned to subcutaneous insulin.  Prescription was given and sent to his pharmacy.  Suspect hypertriglyceridemia secondary to uncontrolled diabetes.  Encouraged tight blood sugar control outpatient.  Prescription given for basal and bolus insulin confirmed co-pay $0 to his pharmacy.  Electrolyte derangements likely due to hyperglycemia hypertriglyceridemia and DKA.  They were vastly improved by the time of discharge.  I sent him prescription for Lipitor and Tricor.  He probably will only need the Tricor until his triglycerides improved.  Recommend rechecking his triglycerides outpatient and if they are more reasonable DC Tricor.  He was tolerating p.o. ambulating not having any pain aside from a headache at the time of discharge.    Vital Signs:    Temp:  [97.5 °F (36.4 °C)-98 °F (36.7 °C)] 98 °F (36.7 °C)  Heart Rate:  [60-80] 80  Resp:  [17] 17  BP: (110-136)/(69-95) 127/90    Physical Exam:  Constitutional:       General: He is not in acute distress.     Appearance: He is not ill-appearing. He is not toxic-appearing.   HENT:      Mouth/Throat:      Mouth: Mucous membranes are moist.   Eyes:      Extraocular Movements: Extraocular movements intact.   Cardiovascular:      Rate and Rhythm: Normal rate and regular rhythm.      Pulses: Normal pulses.      Heart sounds: Normal heart sounds.   Pulmonary:      Effort: Pulmonary effort is normal.      Breath sounds: Normal breath sounds.   Abdominal:      Palpations: Abdomen is soft.      Tenderness: There is no abdominal tenderness.   Musculoskeletal:      Right lower leg: No edema.      Left lower leg: No edema.   Skin:     General: Skin is warm.   Neurological:      General: No focal deficit present.      Mental Status: He is alert and oriented to person, place, and time.   Psychiatric:         Mood and Affect: Mood normal.         Thought Content: Thought content normal.        Pertinent Lab Results:     Results from last 7 days   Lab Units 10/07/23  1149 10/07/23  0740 10/07/23  0516 10/06/23  1243 10/06/23  0800 10/06/23  0350 10/05/23  2056   SODIUM mmol/L 133* 134* 135*   < > 125*  125* 122* 118*   POTASSIUM mmol/L 4.4 5.4* 4.1   < > 5.2  5.2 5.7* 6.0*   CHLORIDE mmol/L 105 108* 106   < > 94*  95* 91* 83*   CO2 mmol/L 11.7* 11.0* 15.0*   < > 5.2*  6.9* 6.4* 13.9*   BUN mg/dL 7 8 10   < > 17  17 20 24*   CREATININE mg/dL 0.48* 0.55* 0.57*   < > 0.69*  0.67* 0.63* 1.23   CALCIUM mg/dL 7.6* 8.0* 7.8*   < > 7.9*  8.0* 8.9 9.9   BILIRUBIN mg/dL  --   --   --   --  0.4 <0.2 0.5   ALK PHOS U/L  --   --   --   --  57 59 83   ALT (SGPT) U/L  --   --   --   --  93* 5 117*   AST (SGOT) U/L  --   --   --   --  88* 5 107*   GLUCOSE mg/dL 206* 153* 136*   < > 309*  304* 317* 674*    < > = values in this interval not displayed.     Results from last 7 days   Lab Units 10/07/23  0516 10/06/23  1027 10/06/23  0630   WBC 10*3/mm3 5.46 5.84 6.63   HEMOGLOBIN g/dL 14.2 14.6 14.1   HEMATOCRIT % 38.2 39.0 38.2   PLATELETS 10*3/mm3 119* 127* 140         Results from last 7 days   Lab Units 10/06/23  1243 10/06/23  0800   HSTROP T ng/L 6 6                           Pertinent Radiology Results:    Imaging Results (All)       Procedure Component Value Units Date/Time    XR Chest 1 View [[id number]] Collected: 10/06/23 1049     Updated: 10/06/23 1730    Narrative:      PROCEDURE: XR CHEST 1 VW-        HISTORY: Assess for Fluid Overload     COMPARISON: None.     FINDINGS: The heart is normal in size. There is mild vascular  engorgement. There is no dense consolidation. There is mild bronchial  wall thickening. There is prominence of the interstitium. There is no  edema. There is no pleural effusion. There is no pneumothorax. There are  no acute osseous abnormalities.       Impression:      Mild vascular engorgement and prominence of the  interstitium.     No edema or pleural effusion.     Mild  bronchial wall thickening.                       Images were reviewed, interpreted, and dictated by USSIE Aguilar  Transcribed by Elif Dominguez PA-C.     This report was signed and finalized on 10/6/2023 5:27 PM by SUSIE Benavides.               Echo:      Condition on Discharge:      Stable.    Code status during the hospital stay:    Code Status and Medical Interventions:   Ordered at: 10/06/23 0520     Code Status (Patient has no pulse and is not breathing):    CPR (Attempt to Resuscitate)     Medical Interventions (Patient has pulse or is breathing):    Full Support     Discharge Disposition:    Home or Self Care    Discharge Medications:       Discharge Medications        New Medications        Instructions Start Date   atorvastatin 40 MG tablet  Commonly known as: LIPITOR   40 mg, Oral, Daily   Start Date: October 8, 2023     fenofibrate 48 MG tablet  Commonly known as: TRICOR   48 mg, Oral, Daily   Start Date: October 8, 2023     Levemir FlexPen 100 UNIT/ML injection  Generic drug: insulin detemir   15 Units, Subcutaneous, Daily      metFORMIN 500 MG tablet  Commonly known as: Glucophage   Take 1 tablet by mouth Daily With Breakfast for 7 days, THEN 1 tablet 2 (Two) Times a Day With Meals for 7 days, THEN 1 tablet 3 (Three) Times a Day for 7 days, THEN 2 tablets 2 (Two) Times a Day With Meals for 7 days.   Start Date: October 7, 2023     NovoLOG FlexPen 100 UNIT/ML solution pen-injector sc pen  Generic drug: insulin aspart   5 Units, Subcutaneous, 3 Times Daily With Meals             Continue These Medications        Instructions Start Date   HYDROcodone-acetaminophen 5-325 MG per tablet  Commonly known as: NORCO   1 tablet, Oral, Every 6 Hours PRN             Discharge Diet:     Diet Instructions       Advance Diet As Tolerated -Target Diet: diabetic      Target Diet: diabetic          Activity at Discharge:       Follow-up Appointments:    Additional Instructions for the Follow-ups  that You Need to Schedule       Discharge Follow-up with PCP   As directed       Currently Documented PCP:    Provider, No Known    PCP Phone Number:    [phone]     Follow Up Details: 1 week               Contact information for follow-up providers       Provider, No Known .    Why: 1 week  Contact information:  [facility]  [place] [address]  [phone]                       Contact information for after-discharge care       [facility] .    Service: [facility]  Contact information:  [address] [place] [address] [phone]                                 No future appointments.  Test Results Pending at Discharge:    Pending Labs       Order Current Status    [place] Top In process    [place] Draw In process               Carrillo Dempsey DO  10/07/23  16:32 EDT    Time: I spent 45 minutes on this discharge activity which included: face-to-face encounter with the patient, reviewing the data in the system, coordination of the care with the nursing staff as well as consultants, documentation, and entering orders.     Dictated utilizing Dragon dictation.        Electronically signed by Carrillo Dempsey DO at 10/07/23 2659

## 2023-11-16 ENCOUNTER — HOSPITAL ENCOUNTER (OUTPATIENT)
Dept: GENERAL RADIOLOGY | Facility: HOSPITAL | Age: 53
Discharge: HOME | End: 2023-11-16
Admitting: NURSE PRACTITIONER
Payer: MEDICAID

## 2023-11-16 ENCOUNTER — HOSPITAL ENCOUNTER (OUTPATIENT)
Dept: GENERAL RADIOLOGY | Facility: HOSPITAL | Age: 53
Discharge: HOME | End: 2023-11-16
Payer: MEDICAID

## 2023-11-16 ENCOUNTER — TRANSCRIBE ORDERS (OUTPATIENT)
Dept: LAB | Facility: HOSPITAL | Age: 53
End: 2023-11-16
Payer: MEDICAID

## 2023-11-16 DIAGNOSIS — M25.569 KNEE PAIN, UNSPECIFIED CHRONICITY, UNSPECIFIED LATERALITY: Primary | ICD-10-CM

## 2023-11-16 DIAGNOSIS — M25.569 KNEE PAIN, UNSPECIFIED CHRONICITY, UNSPECIFIED LATERALITY: ICD-10-CM

## 2023-11-16 DIAGNOSIS — M54.2 NECK PAIN: ICD-10-CM

## 2023-11-16 PROCEDURE — 73562 X-RAY EXAM OF KNEE 3: CPT

## 2023-11-16 PROCEDURE — 72050 X-RAY EXAM NECK SPINE 4/5VWS: CPT

## 2024-06-01 ENCOUNTER — HOSPITAL ENCOUNTER (OUTPATIENT)
Facility: HOSPITAL | Age: 54
Setting detail: OBSERVATION
Discharge: HOME | End: 2024-06-03
Attending: STUDENT IN AN ORGANIZED HEALTH CARE EDUCATION/TRAINING PROGRAM | Admitting: FAMILY MEDICINE
Payer: MEDICAID

## 2024-06-01 DIAGNOSIS — R42 DIZZINESS: Primary | ICD-10-CM

## 2024-06-01 DIAGNOSIS — B19.20 HEPATITIS C VIRUS INFECTION WITHOUT HEPATIC COMA, UNSPECIFIED CHRONICITY: ICD-10-CM

## 2024-06-01 LAB
ALBUMIN SERPL-MCNC: 4.1 G/DL (ref 3.5–5.2)
ALBUMIN/GLOB SERPL: 1.1 G/DL
ALP SERPL-CCNC: 89 U/L (ref 39–117)
ALT SERPL W P-5'-P-CCNC: 186 U/L (ref 1–41)
ANION GAP SERPL CALCULATED.3IONS-SCNC: 18 MMOL/L (ref 5–15)
AST SERPL-CCNC: 122 U/L (ref 1–40)
BASOPHILS # BLD AUTO: 0.11 10*3/MM3 (ref 0–0.2)
BASOPHILS NFR BLD AUTO: 1.1 % (ref 0–1.5)
BILIRUB SERPL-MCNC: 1.4 MG/DL (ref 0–1.2)
BUN SERPL-MCNC: 10 MG/DL (ref 6–20)
BUN/CREAT SERPL: 13 (ref 7–25)
CALCIUM SPEC-SCNC: 9.3 MG/DL (ref 8.6–10.5)
CHLORIDE SERPL-SCNC: 97 MMOL/L (ref 98–107)
CO2 SERPL-SCNC: 22 MMOL/L (ref 22–29)
CREAT SERPL-MCNC: 0.77 MG/DL (ref 0.76–1.27)
DEPRECATED RDW RBC AUTO: 41.1 FL (ref 37–54)
EGFRCR SERPLBLD CKD-EPI 2021: 107.7 ML/MIN/1.73
EOSINOPHIL # BLD AUTO: 0.36 10*3/MM3 (ref 0–0.4)
EOSINOPHIL NFR BLD AUTO: 3.5 % (ref 0.3–6.2)
ERYTHROCYTE [DISTWIDTH] IN BLOOD BY AUTOMATED COUNT: 12.6 % (ref 12.3–15.4)
FENTANYL UR-MCNC: NEGATIVE NG/ML
GLOBULIN UR ELPH-MCNC: 3.8 GM/DL
GLUCOSE BLDC GLUCOMTR-MCNC: 137 MG/DL (ref 70–130)
GLUCOSE SERPL-MCNC: 144 MG/DL (ref 65–99)
HCT VFR BLD AUTO: 46.7 % (ref 37.5–51)
HGB BLD-MCNC: 17.2 G/DL (ref 13–17.7)
HOLD SPECIMEN: NORMAL
HOLD SPECIMEN: NORMAL
IMM GRANULOCYTES # BLD AUTO: 0.04 10*3/MM3 (ref 0–0.05)
IMM GRANULOCYTES NFR BLD AUTO: 0.4 % (ref 0–0.5)
LIPASE SERPL-CCNC: 73 U/L (ref 13–60)
LYMPHOCYTES # BLD AUTO: 4.01 10*3/MM3 (ref 0.7–3.1)
LYMPHOCYTES NFR BLD AUTO: 39.2 % (ref 19.6–45.3)
MAGNESIUM SERPL-MCNC: 1.4 MG/DL (ref 1.6–2.6)
MCH RBC QN AUTO: 32.5 PG (ref 26.6–33)
MCHC RBC AUTO-ENTMCNC: 36.8 G/DL (ref 31.5–35.7)
MCV RBC AUTO: 88.1 FL (ref 79–97)
MONOCYTES # BLD AUTO: 0.9 10*3/MM3 (ref 0.1–0.9)
MONOCYTES NFR BLD AUTO: 8.8 % (ref 5–12)
NEUTROPHILS NFR BLD AUTO: 4.81 10*3/MM3 (ref 1.7–7)
NEUTROPHILS NFR BLD AUTO: 47 % (ref 42.7–76)
NRBC BLD AUTO-RTO: 0 /100 WBC (ref 0–0.2)
PLATELET # BLD AUTO: 158 10*3/MM3 (ref 140–450)
PMV BLD AUTO: 11.4 FL (ref 6–12)
POTASSIUM SERPL-SCNC: 3.6 MMOL/L (ref 3.5–5.2)
PROT SERPL-MCNC: 7.9 G/DL (ref 6–8.5)
RBC # BLD AUTO: 5.3 10*6/MM3 (ref 4.14–5.8)
RBC MORPH BLD: NORMAL
SMALL PLATELETS BLD QL SMEAR: ADEQUATE
SODIUM SERPL-SCNC: 137 MMOL/L (ref 136–145)
TROPONIN T SERPL HS-MCNC: <6 NG/L
WBC MORPH BLD: NORMAL
WBC NRBC COR # BLD AUTO: 10.23 10*3/MM3 (ref 3.4–10.8)
WHOLE BLOOD HOLD COAG: NORMAL
WHOLE BLOOD HOLD SPECIMEN: NORMAL

## 2024-06-01 PROCEDURE — 83690 ASSAY OF LIPASE: CPT | Performed by: PHYSICIAN ASSISTANT

## 2024-06-01 PROCEDURE — 99285 EMERGENCY DEPT VISIT HI MDM: CPT

## 2024-06-01 PROCEDURE — 25010000002 ONDANSETRON PER 1 MG: Performed by: PHYSICIAN ASSISTANT

## 2024-06-01 PROCEDURE — 25810000003 SODIUM CHLORIDE 0.9 % SOLUTION: Performed by: PHYSICIAN ASSISTANT

## 2024-06-01 PROCEDURE — 80307 DRUG TEST PRSMV CHEM ANLYZR: CPT | Performed by: PHYSICIAN ASSISTANT

## 2024-06-01 PROCEDURE — 83735 ASSAY OF MAGNESIUM: CPT | Performed by: PHYSICIAN ASSISTANT

## 2024-06-01 PROCEDURE — 85007 BL SMEAR W/DIFF WBC COUNT: CPT | Performed by: PHYSICIAN ASSISTANT

## 2024-06-01 PROCEDURE — G0378 HOSPITAL OBSERVATION PER HR: HCPCS

## 2024-06-01 PROCEDURE — 81001 URINALYSIS AUTO W/SCOPE: CPT | Performed by: PHYSICIAN ASSISTANT

## 2024-06-01 PROCEDURE — 80053 COMPREHEN METABOLIC PANEL: CPT | Performed by: PHYSICIAN ASSISTANT

## 2024-06-01 PROCEDURE — 25010000002 KETOROLAC TROMETHAMINE PER 15 MG: Performed by: PHYSICIAN ASSISTANT

## 2024-06-01 PROCEDURE — 96375 TX/PRO/DX INJ NEW DRUG ADDON: CPT

## 2024-06-01 PROCEDURE — 93005 ELECTROCARDIOGRAM TRACING: CPT

## 2024-06-01 PROCEDURE — 84484 ASSAY OF TROPONIN QUANT: CPT | Performed by: PHYSICIAN ASSISTANT

## 2024-06-01 PROCEDURE — 82948 REAGENT STRIP/BLOOD GLUCOSE: CPT

## 2024-06-01 PROCEDURE — 85025 COMPLETE CBC W/AUTO DIFF WBC: CPT | Performed by: PHYSICIAN ASSISTANT

## 2024-06-01 RX ORDER — ONDANSETRON 2 MG/ML
4 INJECTION INTRAMUSCULAR; INTRAVENOUS ONCE
Status: COMPLETED | OUTPATIENT
Start: 2024-06-01 | End: 2024-06-01

## 2024-06-01 RX ORDER — SODIUM CHLORIDE 0.9 % (FLUSH) 0.9 %
10 SYRINGE (ML) INJECTION AS NEEDED
Status: DISCONTINUED | OUTPATIENT
Start: 2024-06-01 | End: 2024-06-03 | Stop reason: HOSPADM

## 2024-06-01 RX ORDER — KETOROLAC TROMETHAMINE 30 MG/ML
30 INJECTION, SOLUTION INTRAMUSCULAR; INTRAVENOUS ONCE
Status: COMPLETED | OUTPATIENT
Start: 2024-06-01 | End: 2024-06-01

## 2024-06-01 RX ADMIN — KETOROLAC TROMETHAMINE 30 MG: 30 INJECTION, SOLUTION INTRAMUSCULAR; INTRAVENOUS at 23:38

## 2024-06-01 RX ADMIN — ONDANSETRON 4 MG: 2 INJECTION INTRAMUSCULAR; INTRAVENOUS at 22:34

## 2024-06-01 RX ADMIN — SODIUM CHLORIDE 1000 ML: 9 INJECTION, SOLUTION INTRAVENOUS at 22:34

## 2024-06-02 ENCOUNTER — APPOINTMENT (OUTPATIENT)
Dept: CT IMAGING | Facility: HOSPITAL | Age: 54
End: 2024-06-02
Payer: MEDICAID

## 2024-06-02 ENCOUNTER — APPOINTMENT (OUTPATIENT)
Dept: MRI IMAGING | Facility: HOSPITAL | Age: 54
End: 2024-06-02
Payer: MEDICAID

## 2024-06-02 PROBLEM — R42 DIZZINESS: Status: ACTIVE | Noted: 2024-06-02

## 2024-06-02 LAB
AMPHET+METHAMPHET UR QL: NEGATIVE
AMPHETAMINES UR QL: NEGATIVE
APAP SERPL-MCNC: <5 MCG/ML (ref 0–30)
BACTERIA UR QL AUTO: NORMAL /HPF
BARBITURATES UR QL SCN: NEGATIVE
BENZODIAZ UR QL SCN: NEGATIVE
BILIRUB UR QL STRIP: NEGATIVE
BUPRENORPHINE SERPL-MCNC: NEGATIVE NG/ML
CANNABINOIDS SERPL QL: NEGATIVE
CHOLEST SERPL-MCNC: 107 MG/DL (ref 0–200)
CLARITY UR: CLEAR
COCAINE UR QL: NEGATIVE
COLOR UR: YELLOW
CRP SERPL-MCNC: <0.3 MG/DL (ref 0–0.5)
D-LACTATE SERPL-SCNC: 0.8 MMOL/L (ref 0.5–2)
ETHANOL BLD-MCNC: <10 MG/DL (ref 0–10)
ETHANOL UR QL: <0.01 %
GLUCOSE BLDC GLUCOMTR-MCNC: 230 MG/DL (ref 70–130)
GLUCOSE BLDC GLUCOMTR-MCNC: 251 MG/DL (ref 70–130)
GLUCOSE BLDC GLUCOMTR-MCNC: 283 MG/DL (ref 70–130)
GLUCOSE BLDC GLUCOMTR-MCNC: 339 MG/DL (ref 70–130)
GLUCOSE UR STRIP-MCNC: NEGATIVE MG/DL
HAV IGM SERPL QL IA: ABNORMAL
HBV CORE IGM SERPL QL IA: ABNORMAL
HBV SURFACE AG SERPL QL IA: ABNORMAL
HCV AB SER QL: REACTIVE
HDLC SERPL-MCNC: 24 MG/DL (ref 40–60)
HGB UR QL STRIP.AUTO: NEGATIVE
HYALINE CASTS UR QL AUTO: NORMAL /LPF
KETONES UR QL STRIP: ABNORMAL
LDLC SERPL CALC-MCNC: 57 MG/DL (ref 0–100)
LDLC/HDLC SERPL: 2.2 {RATIO}
LEUKOCYTE ESTERASE UR QL STRIP.AUTO: NEGATIVE
METHADONE UR QL SCN: NEGATIVE
NITRITE UR QL STRIP: NEGATIVE
OPIATES UR QL: POSITIVE
OXYCODONE UR QL SCN: NEGATIVE
PCP UR QL SCN: NEGATIVE
PH UR STRIP.AUTO: 5.5 [PH] (ref 5–8)
PROCALCITONIN SERPL-MCNC: 0.07 NG/ML (ref 0–0.25)
PROT UR QL STRIP: ABNORMAL
RBC # UR STRIP: NORMAL /HPF
REF LAB TEST METHOD: NORMAL
SP GR UR STRIP: 1.02 (ref 1–1.03)
SQUAMOUS #/AREA URNS HPF: NORMAL /HPF
TRICYCLICS UR QL SCN: NEGATIVE
TRIGL SERPL-MCNC: 151 MG/DL (ref 0–150)
TROPONIN T SERPL HS-MCNC: 6 NG/L
TSH SERPL DL<=0.05 MIU/L-ACNC: 4.11 UIU/ML (ref 0.27–4.2)
UROBILINOGEN UR QL STRIP: ABNORMAL
VLDLC SERPL-MCNC: 26 MG/DL (ref 5–40)
WBC # UR STRIP: NORMAL /HPF

## 2024-06-02 PROCEDURE — 82948 REAGENT STRIP/BLOOD GLUCOSE: CPT | Performed by: FAMILY MEDICINE

## 2024-06-02 PROCEDURE — 25010000002 DIPHENHYDRAMINE PER 50 MG: Performed by: PHYSICIAN ASSISTANT

## 2024-06-02 PROCEDURE — 96366 THER/PROPH/DIAG IV INF ADDON: CPT

## 2024-06-02 PROCEDURE — G0378 HOSPITAL OBSERVATION PER HR: HCPCS

## 2024-06-02 PROCEDURE — 25010000002 DEXAMETHASONE SODIUM PHOSPHATE 10 MG/ML SOLUTION: Performed by: STUDENT IN AN ORGANIZED HEALTH CARE EDUCATION/TRAINING PROGRAM

## 2024-06-02 PROCEDURE — 83605 ASSAY OF LACTIC ACID: CPT | Performed by: FAMILY MEDICINE

## 2024-06-02 PROCEDURE — 25810000003 SODIUM CHLORIDE 0.9 % SOLUTION: Performed by: FAMILY MEDICINE

## 2024-06-02 PROCEDURE — 70496 CT ANGIOGRAPHY HEAD: CPT

## 2024-06-02 PROCEDURE — 74176 CT ABD & PELVIS W/O CONTRAST: CPT

## 2024-06-02 PROCEDURE — 84484 ASSAY OF TROPONIN QUANT: CPT | Performed by: PHYSICIAN ASSISTANT

## 2024-06-02 PROCEDURE — 96365 THER/PROPH/DIAG IV INF INIT: CPT

## 2024-06-02 PROCEDURE — 99223 1ST HOSP IP/OBS HIGH 75: CPT | Performed by: FAMILY MEDICINE

## 2024-06-02 PROCEDURE — 25510000001 IOPAMIDOL 61 % SOLUTION: Performed by: STUDENT IN AN ORGANIZED HEALTH CARE EDUCATION/TRAINING PROGRAM

## 2024-06-02 PROCEDURE — 86140 C-REACTIVE PROTEIN: CPT | Performed by: FAMILY MEDICINE

## 2024-06-02 PROCEDURE — 96375 TX/PRO/DX INJ NEW DRUG ADDON: CPT

## 2024-06-02 PROCEDURE — 84145 PROCALCITONIN (PCT): CPT | Performed by: FAMILY MEDICINE

## 2024-06-02 PROCEDURE — 84443 ASSAY THYROID STIM HORMONE: CPT | Performed by: FAMILY MEDICINE

## 2024-06-02 PROCEDURE — 25010000002 MAGNESIUM SULFATE 2 GM/50ML SOLUTION: Performed by: FAMILY MEDICINE

## 2024-06-02 PROCEDURE — 80074 ACUTE HEPATITIS PANEL: CPT | Performed by: FAMILY MEDICINE

## 2024-06-02 PROCEDURE — 25010000002 DROPERIDOL PER 5 MG: Performed by: FAMILY MEDICINE

## 2024-06-02 PROCEDURE — 63710000001 INSULIN GLARGINE PER 5 UNITS: Performed by: FAMILY MEDICINE

## 2024-06-02 PROCEDURE — 82948 REAGENT STRIP/BLOOD GLUCOSE: CPT

## 2024-06-02 PROCEDURE — 80143 DRUG ASSAY ACETAMINOPHEN: CPT | Performed by: FAMILY MEDICINE

## 2024-06-02 PROCEDURE — 80061 LIPID PANEL: CPT | Performed by: FAMILY MEDICINE

## 2024-06-02 PROCEDURE — 25010000002 METOCLOPRAMIDE PER 10 MG: Performed by: PHYSICIAN ASSISTANT

## 2024-06-02 PROCEDURE — 63710000001 INSULIN LISPRO (HUMAN) PER 5 UNITS: Performed by: FAMILY MEDICINE

## 2024-06-02 PROCEDURE — 70450 CT HEAD/BRAIN W/O DYE: CPT

## 2024-06-02 PROCEDURE — 36415 COLL VENOUS BLD VENIPUNCTURE: CPT

## 2024-06-02 PROCEDURE — 82077 ASSAY SPEC XCP UR&BREATH IA: CPT | Performed by: FAMILY MEDICINE

## 2024-06-02 PROCEDURE — 70551 MRI BRAIN STEM W/O DYE: CPT

## 2024-06-02 PROCEDURE — 70498 CT ANGIOGRAPHY NECK: CPT

## 2024-06-02 RX ORDER — ACETAMINOPHEN 325 MG/1
650 TABLET ORAL EVERY 4 HOURS PRN
Status: DISCONTINUED | OUTPATIENT
Start: 2024-06-02 | End: 2024-06-03 | Stop reason: HOSPADM

## 2024-06-02 RX ORDER — DEXTROSE MONOHYDRATE 25 G/50ML
25 INJECTION, SOLUTION INTRAVENOUS
Status: DISCONTINUED | OUTPATIENT
Start: 2024-06-02 | End: 2024-06-03 | Stop reason: HOSPADM

## 2024-06-02 RX ORDER — FLUOXETINE HYDROCHLORIDE 20 MG/1
1 CAPSULE ORAL DAILY
COMMUNITY
Start: 2024-05-23

## 2024-06-02 RX ORDER — LIDOCAINE HYDROCHLORIDE 20 MG/ML
10 SOLUTION OROPHARYNGEAL ONCE
Status: COMPLETED | OUTPATIENT
Start: 2024-06-02 | End: 2024-06-02

## 2024-06-02 RX ORDER — NICOTINE 21 MG/24HR
1 PATCH, TRANSDERMAL 24 HOURS TRANSDERMAL
Status: DISCONTINUED | OUTPATIENT
Start: 2024-06-02 | End: 2024-06-03 | Stop reason: HOSPADM

## 2024-06-02 RX ORDER — FLUOXETINE HYDROCHLORIDE 20 MG/1
20 CAPSULE ORAL DAILY
Status: DISCONTINUED | OUTPATIENT
Start: 2024-06-02 | End: 2024-06-03 | Stop reason: HOSPADM

## 2024-06-02 RX ORDER — DEXAMETHASONE SODIUM PHOSPHATE 10 MG/ML
10 INJECTION, SOLUTION INTRAMUSCULAR; INTRAVENOUS ONCE
Status: COMPLETED | OUTPATIENT
Start: 2024-06-02 | End: 2024-06-02

## 2024-06-02 RX ORDER — DIPHENHYDRAMINE HYDROCHLORIDE 50 MG/ML
25 INJECTION INTRAMUSCULAR; INTRAVENOUS ONCE
Status: COMPLETED | OUTPATIENT
Start: 2024-06-02 | End: 2024-06-02

## 2024-06-02 RX ORDER — SODIUM CHLORIDE 9 MG/ML
40 INJECTION, SOLUTION INTRAVENOUS AS NEEDED
Status: DISCONTINUED | OUTPATIENT
Start: 2024-06-02 | End: 2024-06-03 | Stop reason: HOSPADM

## 2024-06-02 RX ORDER — ONDANSETRON 2 MG/ML
4 INJECTION INTRAMUSCULAR; INTRAVENOUS EVERY 6 HOURS PRN
Status: DISCONTINUED | OUTPATIENT
Start: 2024-06-02 | End: 2024-06-03 | Stop reason: HOSPADM

## 2024-06-02 RX ORDER — NICOTINE POLACRILEX 4 MG
15 LOZENGE BUCCAL
Status: DISCONTINUED | OUTPATIENT
Start: 2024-06-02 | End: 2024-06-03 | Stop reason: HOSPADM

## 2024-06-02 RX ORDER — NITROGLYCERIN 0.4 MG/1
0.4 TABLET SUBLINGUAL
Status: DISCONTINUED | OUTPATIENT
Start: 2024-06-02 | End: 2024-06-03 | Stop reason: HOSPADM

## 2024-06-02 RX ORDER — FENOFIBRATE 48 MG/1
48 TABLET, COATED ORAL DAILY
Status: DISCONTINUED | OUTPATIENT
Start: 2024-06-02 | End: 2024-06-03 | Stop reason: HOSPADM

## 2024-06-02 RX ORDER — BUSPIRONE HYDROCHLORIDE 5 MG/1
5 TABLET ORAL 3 TIMES DAILY
Status: DISCONTINUED | OUTPATIENT
Start: 2024-06-02 | End: 2024-06-03 | Stop reason: HOSPADM

## 2024-06-02 RX ORDER — METOCLOPRAMIDE HYDROCHLORIDE 5 MG/ML
10 INJECTION INTRAMUSCULAR; INTRAVENOUS ONCE
Status: COMPLETED | OUTPATIENT
Start: 2024-06-02 | End: 2024-06-02

## 2024-06-02 RX ORDER — ACETAMINOPHEN 160 MG/5ML
650 SOLUTION ORAL EVERY 4 HOURS PRN
Status: DISCONTINUED | OUTPATIENT
Start: 2024-06-02 | End: 2024-06-03 | Stop reason: HOSPADM

## 2024-06-02 RX ORDER — ASPIRIN 81 MG/1
81 TABLET ORAL DAILY
Status: DISCONTINUED | OUTPATIENT
Start: 2024-06-02 | End: 2024-06-03 | Stop reason: HOSPADM

## 2024-06-02 RX ORDER — INSULIN LISPRO 100 [IU]/ML
2-9 INJECTION, SOLUTION INTRAVENOUS; SUBCUTANEOUS
Status: DISCONTINUED | OUTPATIENT
Start: 2024-06-02 | End: 2024-06-03 | Stop reason: HOSPADM

## 2024-06-02 RX ORDER — DROPERIDOL 2.5 MG/ML
1.25 INJECTION, SOLUTION INTRAMUSCULAR; INTRAVENOUS ONCE
Status: COMPLETED | OUTPATIENT
Start: 2024-06-02 | End: 2024-06-02

## 2024-06-02 RX ORDER — BISACODYL 10 MG
10 SUPPOSITORY, RECTAL RECTAL DAILY PRN
Status: DISCONTINUED | OUTPATIENT
Start: 2024-06-02 | End: 2024-06-03 | Stop reason: HOSPADM

## 2024-06-02 RX ORDER — ALUMINA, MAGNESIA, AND SIMETHICONE 2400; 2400; 240 MG/30ML; MG/30ML; MG/30ML
15 SUSPENSION ORAL EVERY 6 HOURS PRN
Status: DISCONTINUED | OUTPATIENT
Start: 2024-06-02 | End: 2024-06-03 | Stop reason: HOSPADM

## 2024-06-02 RX ORDER — MECLIZINE HYDROCHLORIDE 25 MG/1
12.5 TABLET ORAL 3 TIMES DAILY PRN
Status: DISCONTINUED | OUTPATIENT
Start: 2024-06-02 | End: 2024-06-03 | Stop reason: HOSPADM

## 2024-06-02 RX ORDER — BUTALBITAL, ACETAMINOPHEN AND CAFFEINE 50; 325; 40 MG/1; MG/1; MG/1
1 TABLET ORAL EVERY 4 HOURS PRN
Status: DISCONTINUED | OUTPATIENT
Start: 2024-06-02 | End: 2024-06-03 | Stop reason: HOSPADM

## 2024-06-02 RX ORDER — PANTOPRAZOLE SODIUM 40 MG/10ML
40 INJECTION, POWDER, LYOPHILIZED, FOR SOLUTION INTRAVENOUS ONCE
Status: COMPLETED | OUTPATIENT
Start: 2024-06-02 | End: 2024-06-02

## 2024-06-02 RX ORDER — ONDANSETRON 4 MG/1
4 TABLET, ORALLY DISINTEGRATING ORAL EVERY 6 HOURS PRN
Status: DISCONTINUED | OUTPATIENT
Start: 2024-06-02 | End: 2024-06-03 | Stop reason: HOSPADM

## 2024-06-02 RX ORDER — ATORVASTATIN CALCIUM 40 MG/1
40 TABLET, FILM COATED ORAL DAILY
Status: DISCONTINUED | OUTPATIENT
Start: 2024-06-02 | End: 2024-06-03 | Stop reason: HOSPADM

## 2024-06-02 RX ORDER — MAGNESIUM SULFATE HEPTAHYDRATE 40 MG/ML
2 INJECTION, SOLUTION INTRAVENOUS ONCE
Status: COMPLETED | OUTPATIENT
Start: 2024-06-02 | End: 2024-06-02

## 2024-06-02 RX ORDER — SODIUM CHLORIDE 0.9 % (FLUSH) 0.9 %
10 SYRINGE (ML) INJECTION EVERY 12 HOURS SCHEDULED
Status: DISCONTINUED | OUTPATIENT
Start: 2024-06-02 | End: 2024-06-03 | Stop reason: HOSPADM

## 2024-06-02 RX ORDER — TOPIRAMATE 25 MG/1
50 TABLET ORAL EVERY 12 HOURS SCHEDULED
Status: DISCONTINUED | OUTPATIENT
Start: 2024-06-02 | End: 2024-06-02

## 2024-06-02 RX ORDER — ACETAMINOPHEN 650 MG/1
650 SUPPOSITORY RECTAL EVERY 4 HOURS PRN
Status: DISCONTINUED | OUTPATIENT
Start: 2024-06-02 | End: 2024-06-03 | Stop reason: HOSPADM

## 2024-06-02 RX ORDER — BISACODYL 5 MG/1
5 TABLET, DELAYED RELEASE ORAL DAILY PRN
Status: DISCONTINUED | OUTPATIENT
Start: 2024-06-02 | End: 2024-06-03 | Stop reason: HOSPADM

## 2024-06-02 RX ORDER — AMOXICILLIN 250 MG
2 CAPSULE ORAL 2 TIMES DAILY PRN
Status: DISCONTINUED | OUTPATIENT
Start: 2024-06-02 | End: 2024-06-03 | Stop reason: HOSPADM

## 2024-06-02 RX ORDER — POLYETHYLENE GLYCOL 3350 17 G/17G
17 POWDER, FOR SOLUTION ORAL DAILY PRN
Status: DISCONTINUED | OUTPATIENT
Start: 2024-06-02 | End: 2024-06-03 | Stop reason: HOSPADM

## 2024-06-02 RX ORDER — BUSPIRONE HYDROCHLORIDE 5 MG/1
1 TABLET ORAL 3 TIMES DAILY
COMMUNITY
Start: 2024-05-23

## 2024-06-02 RX ORDER — SODIUM CHLORIDE 0.9 % (FLUSH) 0.9 %
10 SYRINGE (ML) INJECTION AS NEEDED
Status: DISCONTINUED | OUTPATIENT
Start: 2024-06-02 | End: 2024-06-03 | Stop reason: HOSPADM

## 2024-06-02 RX ADMIN — LIDOCAINE HYDROCHLORIDE 10 ML: 20 SOLUTION ORAL at 01:48

## 2024-06-02 RX ADMIN — INSULIN LISPRO 4 UNITS: 100 INJECTION, SOLUTION INTRAVENOUS; SUBCUTANEOUS at 18:53

## 2024-06-02 RX ADMIN — BUSPIRONE HYDROCHLORIDE 5 MG: 5 TABLET ORAL at 11:10

## 2024-06-02 RX ADMIN — BUSPIRONE HYDROCHLORIDE 5 MG: 5 TABLET ORAL at 16:59

## 2024-06-02 RX ADMIN — BUSPIRONE HYDROCHLORIDE 5 MG: 5 TABLET ORAL at 20:59

## 2024-06-02 RX ADMIN — IOPAMIDOL 100 ML: 612 INJECTION, SOLUTION INTRAVENOUS at 03:19

## 2024-06-02 RX ADMIN — INSULIN LISPRO 6 UNITS: 100 INJECTION, SOLUTION INTRAVENOUS; SUBCUTANEOUS at 12:25

## 2024-06-02 RX ADMIN — DIPHENHYDRAMINE HYDROCHLORIDE 25 MG: 50 INJECTION, SOLUTION INTRAMUSCULAR; INTRAVENOUS at 00:46

## 2024-06-02 RX ADMIN — METOCLOPRAMIDE 10 MG: 5 INJECTION, SOLUTION INTRAMUSCULAR; INTRAVENOUS at 00:46

## 2024-06-02 RX ADMIN — INSULIN GLARGINE 10 UNITS: 100 INJECTION, SOLUTION SUBCUTANEOUS at 08:56

## 2024-06-02 RX ADMIN — BUTALBITAL, ACETAMINOPHEN, AND CAFFEINE 1 TABLET: 50; 325; 40 TABLET ORAL at 20:59

## 2024-06-02 RX ADMIN — FLUOXETINE 20 MG: 20 CAPSULE ORAL at 11:07

## 2024-06-02 RX ADMIN — DEXAMETHASONE SODIUM PHOSPHATE 10 MG: 10 INJECTION INTRAMUSCULAR; INTRAVENOUS at 03:34

## 2024-06-02 RX ADMIN — Medication 10 ML: at 21:00

## 2024-06-02 RX ADMIN — DROPERIDOL 1.25 MG: 2.5 INJECTION, SOLUTION INTRAMUSCULAR; INTRAVENOUS at 05:57

## 2024-06-02 RX ADMIN — FENOFIBRATE 48 MG: 48 TABLET ORAL at 11:10

## 2024-06-02 RX ADMIN — Medication 10 ML: at 11:08

## 2024-06-02 RX ADMIN — ATORVASTATIN CALCIUM 40 MG: 40 TABLET, FILM COATED ORAL at 08:55

## 2024-06-02 RX ADMIN — PANTOPRAZOLE SODIUM 40 MG: 40 INJECTION, POWDER, FOR SOLUTION INTRAVENOUS at 01:48

## 2024-06-02 RX ADMIN — ASPIRIN 81 MG: 81 TABLET, COATED ORAL at 08:55

## 2024-06-02 RX ADMIN — BUTALBITAL, ACETAMINOPHEN, AND CAFFEINE 1 TABLET: 50; 325; 40 TABLET ORAL at 08:55

## 2024-06-02 RX ADMIN — INSULIN LISPRO 7 UNITS: 100 INJECTION, SOLUTION INTRAVENOUS; SUBCUTANEOUS at 20:59

## 2024-06-02 RX ADMIN — MAGNESIUM SULFATE HEPTAHYDRATE 2 G: 40 INJECTION, SOLUTION INTRAVENOUS at 03:00

## 2024-06-02 RX ADMIN — SODIUM CHLORIDE 1000 ML: 9 INJECTION, SOLUTION INTRAVENOUS at 03:23

## 2024-06-02 RX ADMIN — INSULIN LISPRO 4 UNITS: 100 INJECTION, SOLUTION INTRAVENOUS; SUBCUTANEOUS at 08:56

## 2024-06-02 RX ADMIN — ALUMINUM HYDROXIDE, MAGNESIUM HYDROXIDE, AND DIMETHICONE: 400; 400; 40 SUSPENSION ORAL at 01:48

## 2024-06-02 RX ADMIN — BUTALBITAL, ACETAMINOPHEN, AND CAFFEINE 1 TABLET: 50; 325; 40 TABLET ORAL at 16:59
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
## 2024-06-02 NOTE — PLAN OF CARE
Problem: Adult Inpatient Plan of Care  Goal: Plan of Care Review  Outcome: Ongoing, Progressing  Goal: Patient-Specific Goal (Individualized)  Outcome: Ongoing, Progressing  Goal: Absence of Hospital-Acquired Illness or Injury  Outcome: Ongoing, Progressing  Intervention: Identify and Manage Fall Risk  Recent Flowsheet Documentation  Taken 6/2/2024 1600 by Bryanna Arenas RN  Safety Promotion/Fall Prevention:   safety round/check completed   room organization consistent  Taken 6/2/2024 1400 by Bryanna Arenas RN  Safety Promotion/Fall Prevention:   safety round/check completed   room organization consistent  Taken 6/2/2024 1200 by Bryanna Arenas RN  Safety Promotion/Fall Prevention:   safety round/check completed   room organization consistent  Taken 6/2/2024 1000 by Bryanna Arenas RN  Safety Promotion/Fall Prevention:   safety round/check completed   room organization consistent  Taken 6/2/2024 0800 by Bryanna Arenas RN  Safety Promotion/Fall Prevention:   safety round/check completed   room organization consistent  Intervention: Prevent Skin Injury  Recent Flowsheet Documentation  Taken 6/2/2024 1600 by Bryanna Arenas RN  Body Position: position changed independently  Taken 6/2/2024 1400 by Bryanna Arenas RN  Body Position: position changed independently  Taken 6/2/2024 1200 by Bryanna Arenas RN  Body Position: position changed independently  Taken 6/2/2024 1000 by Bryanna Arenas RN  Body Position: position changed independently  Taken 6/2/2024 0800 by Bryanna Arenas RN  Body Position: position changed independently  Intervention: Prevent Infection  Recent Flowsheet Documentation  Taken 6/2/2024 1600 by Bryanna Arenas RN  Infection Prevention:   single patient room provided   rest/sleep promoted  Taken 6/2/2024 1400 by Bryanna Arenas RN  Infection Prevention:   single patient room provided   rest/sleep promoted  Taken 6/2/2024 1000 by Bryanna Arenas RN  Infection  Prevention:   single patient room provided   rest/sleep promoted  Goal: Optimal Comfort and Wellbeing  Outcome: Ongoing, Progressing  Intervention: Provide Person-Centered Care  Recent Flowsheet Documentation  Taken 6/2/2024 0800 by Bryanna Arenas, RN  Trust Relationship/Rapport:   care explained   choices provided  Goal: Readiness for Transition of Care  Outcome: Ongoing, Progressing     Problem: Nausea and Vomiting  Goal: Fluid and Electrolyte Balance  Outcome: Ongoing, Progressing   Goal Outcome Evaluation:

## 2024-06-02 NOTE — PLAN OF CARE
Problem: Adult Inpatient Plan of Care  Goal: Plan of Care Review  Outcome: Ongoing, Progressing  Flowsheets (Taken 6/2/2024 0525)  Progress: no change  Plan of Care Reviewed With: patient  Goal: Patient-Specific Goal (Individualized)  Outcome: Ongoing, Progressing  Goal: Absence of Hospital-Acquired Illness or Injury  Outcome: Ongoing, Progressing  Goal: Optimal Comfort and Wellbeing  Outcome: Ongoing, Progressing  Goal: Readiness for Transition of Care  Outcome: Ongoing, Progressing  Intervention: Mutually Develop Transition Plan  Recent Flowsheet Documentation  Taken 6/2/2024 0521 by Savanna Proctor, ASUNCION  Transportation Anticipated: family or friend will provide  Patient/Family Anticipated Services at Transition:   Patient/Family Anticipates Transition to:   home   home with family  Taken 6/2/2024 0520 by Savanna Proctor, RN  Equipment Currently Used at Home: none     Problem: Nausea and Vomiting  Goal: Fluid and Electrolyte Balance  Outcome: Ongoing, Progressing   Goal Outcome Evaluation:  Plan of Care Reviewed With: patient        Progress: no change

## 2024-06-02 NOTE — PAYOR COMM NOTE
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 symptoms, the presence of pyuria, bacteria, and/or nitrites on the urinalysis result does not correlate with infection.    Urinalysis, Microscopic Only - Urine, Clean Catch [[id number]] Collected: 06/01/24 2254    Specimen: Urine, Clean Catch Updated: 06/02/24 0013     RBC, UA None Seen /HPF      WBC, UA 0-2 /HPF      Comment: Urine culture not indicated.        Bacteria, UA None Seen /HPF      Squamous Epithelial Cells, UA None Seen /HPF      Hyaline Casts, UA None Seen /LPF      Methodology Manual Light Microscopy    Urine Drug Screen - Urine, Clean Catch [[id number]]  (Abnormal) Collected: 06/01/24 2254    Specimen: Urine, Clean Catch Updated: 06/02/24 0000     THC, Screen, Urine Negative     Phencyclidine (PCP), Urine Negative     Cocaine Screen, Urine  Negative     Methamphetamine, Ur Negative     Opiate Screen Positive     Amphetamine Screen, Urine Negative     Benzodiazepine Screen, Urine Negative     Tricyclic Antidepressants Screen Negative     Methadone Screen, Urine Negative     Barbiturates Screen, Urine Negative     Oxycodone Screen, Urine Negative     Buprenorphine, Screen, Urine Negative    Narrative:      Cutoff For Drugs Screened:    Amphetamines               500 ng/ml  Barbiturates               200 ng/ml  Benzodiazepines            150 ng/ml  Cocaine                    150 ng/ml  Methadone                  200 ng/ml  Opiates                    100 ng/ml  Phencyclidine               25 ng/ml  THC                         50 ng/ml  Methamphetamine            500 ng/ml  Tricyclic Antidepressants  300 ng/ml  Oxycodone                  100 ng/ml  Buprenorphine               10 ng/ml    The normal value for all drugs tested is negative. This report includes unconfirmed screening results, with the cutoff values listed, to be used for medical treatment purposes only.  Unconfirmed results must not be used for non-medical purposes such as employment or legal testing.  Clinical consideration should be applied to any drug of abuse test, particularly when unconfirmed results are used.      CBC & Differential [[id number]]  (Abnormal) Collected: 06/01/24 2229    Specimen: Blood Updated: 06/01/24 6273    Narrative:      The following orders were created for panel order CBC & Differential.  Procedure                               Abnormality         Status                     ---------                               -----------         ------                     CBC Auto Differential[[id number]]        Abnormal            Final result               Scan Slide[[id number]]                                       Final result                 Please view results for these tests on the individual orders.    Scan Slide [[id number]] Collected: 06/01/24 2229    Specimen: Blood  Updated: 06/01/24 2345     RBC Morphology Normal     WBC Morphology Normal     Platelet Estimate Adequate    CBC Auto Differential [[id number]]  (Abnormal) Collected: 06/01/24 2229    Specimen: Blood Updated: 06/01/24 2344     WBC 10.23 10*3/mm3      RBC 5.30 10*6/mm3      Hemoglobin 17.2 g/dL      Hematocrit 46.7 %      MCV 88.1 fL      MCH 32.5 pg      MCHC 36.8 g/dL      RDW 12.6 %      RDW-SD 41.1 fl      MPV 11.4 fL      Platelets 158 10*3/mm3      Neutrophil % 47.0 %      Lymphocyte % 39.2 %      Monocyte % 8.8 %      Eosinophil % 3.5 %      Basophil % 1.1 %      Immature Grans % 0.4 %      Neutrophils, Absolute 4.81 10*3/mm3      Lymphocytes, Absolute 4.01 10*3/mm3      Monocytes, Absolute 0.90 10*3/mm3      Eosinophils, Absolute 0.36 10*3/mm3      Basophils, Absolute 0.11 10*3/mm3      Immature Grans, Absolute 0.04 10*3/mm3      nRBC 0.0 /100 WBC     Fentanyl, Urine - Urine, Clean Catch [[id number]]  (Normal) Collected: 06/01/24 2254    Specimen: Urine, Clean Catch Updated: 06/01/24 2316     Fentanyl, Urine Negative    Narrative:      Negative Threshold:      Fentanyl 5 ng/mL     The normal value for the drug tested is negative. This report includes final unconfirmed screening results to be used for medical treatment purposes only. Unconfirmed results must not be used for non-medical purposes such as employment or legal testing. Clinical consideration should be applied to any drug of abuse test, particularly when unconfirmed results are used.           Single High Sensitivity Troponin T [[id number]]  (Normal) Collected: 06/01/24 2229    Specimen: Blood Updated: 06/01/24 2311     HS Troponin T <6 ng/L     Narrative:      High Sensitive Troponin T Reference Range:  <14.0 ng/L- Negative Female for AMI  <22.0 ng/L- Negative Male for AMI  >=14 - Abnormal Female indicating possible myocardial injury.  >=22 - Abnormal Male indicating possible myocardial injury.   Clinicians would have to utilize clinical acumen, EKG,  Troponin, and serial changes to determine if it is an Acute Myocardial Infarction or myocardial injury due to an underlying chronic condition.         Comprehensive Metabolic Panel [[id number]]  (Abnormal) Collected: 06/01/24 2229    Specimen: Blood Updated: 06/01/24 2309     Glucose 144 mg/dL      BUN 10 mg/dL      Creatinine 0.77 mg/dL      Sodium 137 mmol/L      Potassium 3.6 mmol/L      Chloride 97 mmol/L      CO2 22.0 mmol/L      Calcium 9.3 mg/dL      Total Protein 7.9 g/dL      Albumin 4.1 g/dL      ALT (SGPT) 186 U/L      AST (SGOT) 122 U/L      Alkaline Phosphatase 89 U/L      Total Bilirubin 1.4 mg/dL      Globulin 3.8 gm/dL      A/G Ratio 1.1 g/dL      BUN/Creatinine Ratio 13.0     Anion Gap 18.0 mmol/L      eGFR 107.7 mL/min/1.73     Narrative:      GFR Normal >60  Chronic Kidney Disease <60  Kidney Failure <15      Lipase [[id number]]  (Abnormal) Collected: 06/01/24 2229    Specimen: Blood Updated: 06/01/24 2309     Lipase 73 U/L     Magnesium [[id number]]  (Abnormal) Collected: 06/01/24 2229    Specimen: Blood Updated: 06/01/24 2309     Magnesium 1.4 mg/dL     [place] Draw [[id number]] Collected: 06/01/24 2229    Specimen: Blood Updated: 06/01/24 2246    Narrative:      The following orders were created for panel order [place] Draw.  Procedure                               Abnormality         Status                     ---------                               -----------         ------                     Green Top (Gel)[[id number]]                                  Final result               Lavender Top[[id number]]                                     Final result               Gold Top - SST[[id number]]                                   Final result               Light Blue Top[[id number]]                                   Final result                 Please view results for these tests on the individual orders.    Green Top (Gel) [[id number]] Collected: 06/01/24 2229    Specimen: Blood Updated: 06/01/24 2246      Extra Tube Hold for add-ons.     Comment: Auto resulted.       Lavender Top [[id number]] Collected: 06/01/24 2229    Specimen: Blood Updated: 06/01/24 2246     Extra Tube hold for add-on     Comment: Auto resulted       Gold Top - SST [[id number]] Collected: 06/01/24 2229    Specimen: Blood Updated: 06/01/24 2246     Extra Tube Hold for add-ons.     Comment: Auto resulted.       Light Blue Top [[id number]] Collected: 06/01/24 2229    Specimen: Blood Updated: 06/01/24 2246     Extra Tube Hold for add-ons.     Comment: Auto resulted       POC Glucose Once [[id number]]  (Abnormal) Collected: 06/01/24 2215    Specimen: Blood Updated: 06/01/24 2219     Glucose 137 mg/dL      Comment: Serial Number: [id number]:  [id number]             Orders (last 48 hrs)        Start     Ordered    06/02/24 0900  atorvastatin (LIPITOR) tablet 40 mg  Daily         06/02/24 0509    06/02/24 0900  fenofibrate (TRICOR) tablet 48 mg  Daily         06/02/24 0509    06/02/24 0900  insulin glargine (LANTUS, SEMGLEE) injection 10 Units  Daily         06/02/24 0509 06/02/24 0900  sodium chloride 0.9 % flush 10 mL  Every 12 Hours Scheduled         06/02/24 0509    06/02/24 0900  topiramate (TOPAMAX) tablet 50 mg  Every 12 Hours Scheduled,   Status:  Discontinued         06/02/24 0510    06/02/24 0900  aspirin EC tablet 81 mg  Daily         06/02/24 0536    06/02/24 0900  nicotine (NICODERM CQ) 21 MG/24HR patch 1 patch  Every 24 Hours Scheduled         06/02/24 0547    06/02/24 0800  Vital Signs  Every 4 Hours       06/02/24 0509    06/02/24 0800  Oral Care  2 Times Daily       06/02/24 0509    06/02/24 0730  Insulin Lispro (humaLOG) injection 2-9 Units  4 Times Daily Before Meals & Nightly         06/02/24 0509    06/02/24 0700  POC Glucose 4x Daily Before Meals & at Bedtime  4 Times Daily Before Meals & at Bedtime      Comments: Complete no more than 45 minutes prior to patient eating      06/02/24 0509    06/02/24 0600  TSH  Morning Draw    "      06/02/24 0509    06/02/24 0600  Lipid Panel  Morning Draw         06/02/24 0509    06/02/24 0600  droperidol (INAPSINE) injection 1.25 mg  Once         06/02/24 0514    06/02/24 0522  Inpatient Case Management  Consult  Once        Provider:  (Not yet assigned)    06/02/24 0522    06/02/24 0510  MRI Brain Without Contrast  1 Time Imaging         06/02/24 0510    06/02/24 0509  aluminum-magnesium hydroxide-simethicone (MAALOX MAX) 400-400-40 MG/5ML suspension 15 mL  Every 6 Hours PRN         06/02/24 0509    06/02/24 0509  ondansetron ODT (ZOFRAN-ODT) disintegrating tablet 4 mg  Every 6 Hours PRN        Placed in \"Or\" Linked Group    06/02/24 0509    06/02/24 0509  ondansetron (ZOFRAN) injection 4 mg  Every 6 Hours PRN        Placed in \"Or\" Linked Group    06/02/24 0509    06/02/24 0509  Intake & Output  Every Shift       06/02/24 0509    06/02/24 0509  Weigh Patient  Once         06/02/24 0509    06/02/24 0509  Insert Peripheral IV  Once         06/02/24 0509    06/02/24 0509  Saline Lock & Maintain IV Access  Continuous,   Status:  Canceled         06/02/24 0509    06/02/24 0509  Code Status and Medical Interventions:  Continuous         06/02/24 0509    06/02/24 0509  Place Sequential Compression Device  Once         06/02/24 0509    06/02/24 0509  Maintain Sequential Compression Device  Continuous         06/02/24 0509    06/02/24 0509  Continuous Cardiac Monitoring  Continuous        Comments: Follow Standing Orders As Outlined in Process Instructions (Open Order Report to View Full Instructions)    06/02/24 0509    06/02/24 0509  Maintain IV Access  Continuous         06/02/24 0509    06/02/24 0509  Telemetry - Place Orders & Notify Provider of Results When Patient Experiences Acute Chest Pain, Dysrhythmia or Respiratory Distress  Continuous        Comments: Open Order Report to View Parameters Requiring Provider Notification    06/02/24 0509    06/02/24 0509  May Be Off Telemetry for Tests " " Continuous         06/02/24 0509    06/02/24 0509  Diet: Diabetic; Consistent Carbohydrate; Fluid Consistency: Thin (IDDSI 0)  Diet Effective Now         06/02/24 0509    06/02/24 0509  acetaminophen (TYLENOL) tablet 650 mg  Every 4 Hours PRN        Placed in \"Or\" Linked Group    06/02/24 0509    06/02/24 0509  acetaminophen (TYLENOL) 160 MG/5ML oral solution 650 mg  Every 4 Hours PRN        Placed in \"Or\" Linked Group    06/02/24 0509    06/02/24 0509  acetaminophen (TYLENOL) suppository 650 mg  Every 4 Hours PRN        Placed in \"Or\" Linked Group    06/02/24 0509    06/02/24 0508  sennosides-docusate (PERICOLACE) 8.6-50 MG per tablet 2 tablet  2 Times Daily PRN        Placed in \"And\" Linked Group    06/02/24 0509    06/02/24 0508  polyethylene glycol (MIRALAX) packet 17 g  Daily PRN        Placed in \"And\" Linked Group    06/02/24 0509    06/02/24 0508  bisacodyl (DULCOLAX) EC tablet 5 mg  Daily PRN        Placed in \"And\" Linked Group    06/02/24 0509    06/02/24 0508  bisacodyl (DULCOLAX) suppository 10 mg  Daily PRN        Placed in \"And\" Linked Group    06/02/24 0509    06/02/24 0508  nitroglycerin (NITROSTAT) SL tablet 0.4 mg  Every 5 Minutes PRN         06/02/24 0509    06/02/24 0508  dextrose (GLUTOSE) oral gel 15 g  Every 15 Minutes PRN         06/02/24 0509    06/02/24 0508  dextrose (D50W) (25 g/50 mL) IV injection 25 g  Every 15 Minutes PRN         06/02/24 0509    06/02/24 0508  glucagon (GLUCAGEN) injection 1 mg  Every 15 Minutes PRN         06/02/24 0509    06/02/24 0508  sodium chloride 0.9 % flush 10 mL  As Needed         06/02/24 0509    06/02/24 0508  sodium chloride 0.9 % infusion 40 mL  As Needed         06/02/24 0509    06/02/24 0506  butalbital-acetaminophen-caffeine (FIORICET, ESGIC) -40 MG per tablet 1 tablet  Every 4 Hours PRN         06/02/24 0506    06/02/24 0421  Cardiac Monitoring  Continuous,   Status:  Canceled        Comments: Follow Standing Orders As Outlined in Process " Instructions (Open Order Report to View Full Instructions)    06/02/24 0420    06/02/24 0420  Initiate Observation Status  Once         06/02/24 0420    06/02/24 0343  dexAMETHasone sodium phosphate injection 10 mg  Once         06/02/24 0327    06/02/24 0329  iopamidol (ISOVUE-300) 61 % injection 100 mL  Once in Imaging         06/02/24 0313    06/02/24 0258  sodium chloride 0.9 % bolus 1,000 mL  Once         06/02/24 0242    06/02/24 0255  magnesium sulfate 2g/50 mL (PREMIX) infusion  Once         06/02/24 0239    06/02/24 0244  Lactic Acid, Plasma  STAT         06/02/24 0243    06/02/24 0240  Procalcitonin  STAT         06/02/24 0239    06/02/24 0240  C-reactive Protein  Once         06/02/24 0239    06/02/24 0240  Hepatitis Panel, Acute  Once         06/02/24 0239    06/02/24 0240  Acetaminophen Level  Once         06/02/24 0239    06/02/24 0238  Ethanol  Once         06/02/24 0237    06/02/24 0232  CT Angiogram Head  1 Time Imaging         06/02/24 0231    06/02/24 0232  CT Angiogram Neck  1 Time Imaging         06/02/24 0231    06/02/24 0144  aluminum-magnesium hydroxide-simethicone (MAALOX MAX) 400-400-40 MG/5ML 15 mL suspension  Once         06/02/24 0128    06/02/24 0144  Lidocaine Viscous HCl (XYLOCAINE) 2 % solution 10 mL  Once         06/02/24 0128    06/02/24 0144  pantoprazole (PROTONIX) injection 40 mg  Once         06/02/24 0128    06/02/24 0133  Single High Sensitivity Troponin T  Once         06/02/24 0132    06/02/24 0039  metoclopramide (REGLAN) injection 10 mg  Once         06/02/24 0023    06/02/24 0039  diphenhydrAMINE (BENADRYL) injection 25 mg  Once         06/02/24 0023    06/01/24 2337  ketorolac (TORADOL) injection 30 mg  Once         06/01/24 2321    06/01/24 2322  CT Abdomen Pelvis Without Contrast  1 Time Imaging         06/01/24 2321 06/01/24 2321  CT Head Without Contrast  1 Time Imaging         06/01/24 2320 06/01/24 2301  Urinalysis, Microscopic Only - Urine, Clean Catch   Once         06/01/24 2300    06/01/24 2300  Scan Slide  Once         06/01/24 2259    06/01/24 2258  Fentanyl, Urine - Urine, Clean Catch  Once         06/01/24 2257    06/01/24 2242  sodium chloride 0.9 % bolus 1,000 mL  Once         06/01/24 2226 06/01/24 2242  ondansetron (ZOFRAN) injection 4 mg  Once         06/01/24 2226 06/01/24 2227  Urine Drug Screen - Urine, Clean Catch  Once         06/01/24 2226    06/01/24 2226  CBC & Differential  Once         06/01/24 2226 06/01/24 2226  Comprehensive Metabolic Panel  Once         06/01/24 2226 06/01/24 2226  Lipase  Once         06/01/24 2226 06/01/24 2226  Urinalysis With Culture If Indicated - Urine, Clean Catch  Once         06/01/24 2226 06/01/24 2226  Single High Sensitivity Troponin T  Once         06/01/24 2226 06/01/24 2226  Magnesium  Once         06/01/24 2226 06/01/24 2226  CBC Auto Differential  PROCEDURE ONCE         06/01/24 2226 06/01/24 2220  POC Glucose Once  PROCEDURE ONCE        Comments: Complete no more than 45 minutes prior to patient eating      06/01/24 2215 06/01/24 2220  Insert peripheral IV  Once         06/01/24 2220 06/01/24 2220  [place] Draw  Once         06/01/24 2220 06/01/24 2220  ECG 12 Lead Other; dizziness and intermittent chest pain  STAT         06/01/24 2220 06/01/24 2220  Green Top (Gel)  PROCEDURE ONCE         06/01/24 2220 06/01/24 2220  Lavender Top  PROCEDURE ONCE         06/01/24 2220 06/01/24 2220  Gold Top - SST  PROCEDURE ONCE         06/01/24 2220 06/01/24 2220  Light Blue Top  PROCEDURE ONCE         06/01/24 2220 06/01/24 2219  sodium chloride 0.9 % flush 10 mL  As Needed         06/01/24 2220    Unscheduled  Follow Hypoglycemia Standing Orders For Blood Glucose <70 & Notify Provider of Treatment  As Needed      Comments: Follow Hypoglycemia Orders As Outlined in Process Instructions (Open Order Report to View Full Instructions)  Notify Provider Any Time Hypoglycemia  Treatment is Administered    06/02/24 0509                  Physician Progress Notes (last 48 hours)  Notes from 05/31/24 0905 through 06/02/24 0905   No notes of this type exist for this encounter.       Consult Notes (last 48 hours)  Notes from 05/31/24 0905 through 06/02/24 0905   No notes of this type exist for this encounter.

## 2024-06-02 NOTE — ED PROVIDER NOTES
ED Course as of 06/02/24 0421   Sun Jun 02, 2024   0002 Opiate Screen(!): Positive [TM]   0002 WBC: 10.23 [TM]   0018 Urinalysis, Microscopic Only - Urine, Clean Catch [TM]   0018 Ketones, UA(!): Trace [TM]   0018 Opiate Screen(!): Positive [TM]   0129 Patient appears to be feeling better at this time.  No more vomiting since Reglan and Benadryl.  States anytime he changes position he gets nauseous and dizzy.  Getting off the CT scanner table reexacerbated his symptoms.  Lying in bed he is feeling better.  He is complaining of some burning in his chest and in the back of his throat, suspect esophageal irritation from significant vomiting.  Will trial GI cocktail. [TM]   0200 I have interviewed and examined the patient FACE-TO-FACE.  I reviewed the mid-level provider(s) note and agree with the clinical impression, plan, and disposition unless otherwise stated in the MDM below.    ATTENDING ATTESTATION  HPI: 53-year-old male presents to the ER with chief complaint of dizziness.  Gradual onset of symptoms around 8 AM this morning.  Described as a room spinning sensation.  Worsened by head movements.  Associated nausea and vomiting.    EXAM:   General: Alert.  Appears chronically ill, but nontoxic.  No acute distress.  Head: Normocephalic.  Atraumatic.  Eyes: Pupils 2 mm.  PERRLA.  EOMI.  No scleral icterus.  Cardiovascular: Regular rate and rhythm.  No murmurs.  No rubs.  2+ distal pulses bilaterally.  Respiratory: Equal breath sounds bilaterally.  No rales.  No rhonchi.  No wheezing.  GI: Abdomen is soft.  Nondistended.  Nontender to palpation.  No rebound.  No guarding.  No CVA tenderness.  MSK: No muscle atrophy.  Neurologic: Oriented x 3.  Speech intact without dysarthria or aphasia. Cranial nerves II-XII intact.  Strength 5/5 bilateral upper and lower extremities.  Sensation to touch grossly intact bilaterally.  No focal deficits.  No pronator drift.  Normal finger-nose test.  Skin: No erythema. No edema.  Psych:  Normal mood and affect.      [JS]   0300 I received signout on this patient by the AUDRA at the end of their shift.  Disposition pending CTA head and neck. [JS]   0418 I have independently reviewed and interpreted the CTA head neck.  My interpretation is negative for large vessel occlusion or aneurysm.   [JS]   0419 On re-evaluation, patient resting comfortably.  Still endorsing mild dizziness, but states symptoms have improved following therapy. Vital signs remained stable on room air. Will proceed with medical admission for further workup and management.  I discussed the findings of the ED workup with the patient including my recommendation for admission.  Patient agreeable with plan and disposition.    Case discussed with Dr. Seo (hospitalist) who agrees to evaluate and admit the patient.  We discussed the HPI, pertinent PMHx, ED course and workup.    Chronic conditions affecting care: None    Social determinants of health impacting treatment or disposition: None [JS]      ED Course User Index  [JS] Tawanda Skelton DO  [TM] Dimitrios Cedeno PA-C        EMERGENCY DEPARTMENT ENCOUNTER    Pt Name: Albin Ivory  MRN: [id number]  Pt :   1970  Room Number:  10/10  Date of encounter:  2024  PCP: Provider, No Known  ED Provider: Tawanda Skelton DO    Historian: Patient      HPI:  Chief Complaint   Patient presents with    Vomiting    Dizziness          Context: Albin Ivory is a 53 y.o. male who presents to the ED c/o chest tightness, nausea vomiting.  Patient states awoke this morning at 8 or 9:00 with nausea and vomiting, went back to sleep and woke up again around 5 PM and has had persistent nausea vomiting.  Has had intermittent chest tightness for several days if not weeks.  He states his son committed suicide a couple months ago and he has had chest pains since then actually.  Patient states he does have a history of diabetes and has been compliant with his medications.  Fingerstick blood  glucose here 137.  Denies abdominal pain or diarrhea.  States he does feel dizzy and lightheaded.  Does smoke marijuana occasionally.      PAST MEDICAL HISTORY  Past Medical History:   Diagnosis Date    Back injury     Hypertension          PAST SURGICAL HISTORY  Past Surgical History:   Procedure Laterality Date    BACK SURGERY  2017         FAMILY HISTORY  No family history on file.      SOCIAL HISTORY  Social History     Socioeconomic History    Marital status: Legally    Tobacco Use    Smoking status: Every Day     Current packs/day: 0.50     Types: Cigarettes   Vaping Use    Vaping status: Never Used   Substance and Sexual Activity    Alcohol use: Not Currently    Drug use: Yes     Types: Marijuana    Sexual activity: Defer         ALLERGIES  Patient has no known allergies.        REVIEW OF SYSTEMS  Review of Systems   Constitutional: Negative.    HENT: Negative.     Eyes: Negative.    Respiratory:  Positive for chest tightness.    Cardiovascular: Negative.    Gastrointestinal:  Positive for nausea and vomiting.   Genitourinary: Negative.    Musculoskeletal: Negative.    Skin: Negative.    Allergic/Immunologic: Negative.    Neurological:  Positive for light-headedness.   Psychiatric/Behavioral: Negative.     All other systems reviewed and are negative.       All systems reviewed and negative except for those discussed in HPI.       PHYSICAL EXAM    I have reviewed the triage vital signs and nursing notes.    ED Triage Vitals [06/01/24 2209]   Temp Heart Rate Resp BP SpO2   98.3 °F (36.8 °C) 120 22 (!) 131/104 97 %      Temp src Heart Rate Source Patient Position BP Location FiO2 (%)   Oral Monitor Sitting Left arm --       Physical Exam  Vitals and nursing note reviewed.   Constitutional:       General: He is not in acute distress.     Appearance: Normal appearance. He is not ill-appearing, toxic-appearing or diaphoretic.   HENT:      Head: Normocephalic and atraumatic.      Nose: Nose normal.   Eyes:       Extraocular Movements: Extraocular movements intact.   Cardiovascular:      Rate and Rhythm: Regular rhythm. Tachycardia present.      Heart sounds: Normal heart sounds.   Pulmonary:      Effort: Pulmonary effort is normal.      Breath sounds: Normal breath sounds.   Abdominal:      General: Abdomen is flat. Bowel sounds are normal. There is no distension.      Tenderness: There is no abdominal tenderness. There is no guarding or rebound.   Musculoskeletal:         General: Normal range of motion.      Cervical back: Normal range of motion.   Skin:     General: Skin is warm and dry.   Neurological:      General: No focal deficit present.      Mental Status: He is alert and oriented to person, place, and time. Mental status is at baseline.      Cranial Nerves: No cranial nerve deficit.      Motor: No weakness.   Psychiatric:         Mood and Affect: Mood normal.         Behavior: Behavior normal.            LAB RESULTS  Recent Results (from the past 24 hour(s))   POC Glucose Once    Collection Time: 06/01/24 10:15 PM    Specimen: Blood   Result Value Ref Range    Glucose 137 (H) 70 - 130 mg/dL   Green Top (Gel)    Collection Time: 06/01/24 10:29 PM   Result Value Ref Range    Extra Tube Hold for add-ons.    Lavender Top    Collection Time: 06/01/24 10:29 PM   Result Value Ref Range    Extra Tube hold for add-on    Gold Top - SST    Collection Time: 06/01/24 10:29 PM   Result Value Ref Range    Extra Tube Hold for add-ons.    Light Blue Top    Collection Time: 06/01/24 10:29 PM   Result Value Ref Range    Extra Tube Hold for add-ons.    Comprehensive Metabolic Panel    Collection Time: 06/01/24 10:29 PM    Specimen: Blood   Result Value Ref Range    Glucose 144 (H) 65 - 99 mg/dL    BUN 10 6 - 20 mg/dL    Creatinine 0.77 0.76 - 1.27 mg/dL    Sodium 137 136 - 145 mmol/L    Potassium 3.6 3.5 - 5.2 mmol/L    Chloride 97 (L) 98 - 107 mmol/L    CO2 22.0 22.0 - 29.0 mmol/L    Calcium 9.3 8.6 - 10.5 mg/dL    Total  Protein 7.9 6.0 - 8.5 g/dL    Albumin 4.1 3.5 - 5.2 g/dL    ALT (SGPT) 186 (H) 1 - 41 U/L    AST (SGOT) 122 (H) 1 - 40 U/L    Alkaline Phosphatase 89 39 - 117 U/L    Total Bilirubin 1.4 (H) 0.0 - 1.2 mg/dL    Globulin 3.8 gm/dL    A/G Ratio 1.1 g/dL    BUN/Creatinine Ratio 13.0 7.0 - 25.0    Anion Gap 18.0 (H) 5.0 - 15.0 mmol/L    eGFR 107.7 >60.0 mL/min/1.73   Lipase    Collection Time: 06/01/24 10:29 PM    Specimen: Blood   Result Value Ref Range    Lipase 73 (H) 13 - 60 U/L   Single High Sensitivity Troponin T    Collection Time: 06/01/24 10:29 PM    Specimen: Blood   Result Value Ref Range    HS Troponin T <6 <22 ng/L   Magnesium    Collection Time: 06/01/24 10:29 PM    Specimen: Blood   Result Value Ref Range    Magnesium 1.4 (L) 1.6 - 2.6 mg/dL   CBC Auto Differential    Collection Time: 06/01/24 10:29 PM    Specimen: Blood   Result Value Ref Range    WBC 10.23 3.40 - 10.80 10*3/mm3    RBC 5.30 4.14 - 5.80 10*6/mm3    Hemoglobin 17.2 13.0 - 17.7 g/dL    Hematocrit 46.7 37.5 - 51.0 %    MCV 88.1 79.0 - 97.0 fL    MCH 32.5 26.6 - 33.0 pg    MCHC 36.8 (H) 31.5 - 35.7 g/dL    RDW 12.6 12.3 - 15.4 %    RDW-SD 41.1 37.0 - 54.0 fl    MPV 11.4 6.0 - 12.0 fL    Platelets 158 140 - 450 10*3/mm3    Neutrophil % 47.0 42.7 - 76.0 %    Lymphocyte % 39.2 19.6 - 45.3 %    Monocyte % 8.8 5.0 - 12.0 %    Eosinophil % 3.5 0.3 - 6.2 %    Basophil % 1.1 0.0 - 1.5 %    Immature Grans % 0.4 0.0 - 0.5 %    Neutrophils, Absolute 4.81 1.70 - 7.00 10*3/mm3    Lymphocytes, Absolute 4.01 (H) 0.70 - 3.10 10*3/mm3    Monocytes, Absolute 0.90 0.10 - 0.90 10*3/mm3    Eosinophils, Absolute 0.36 0.00 - 0.40 10*3/mm3    Basophils, Absolute 0.11 0.00 - 0.20 10*3/mm3    Immature Grans, Absolute 0.04 0.00 - 0.05 10*3/mm3    nRBC 0.0 0.0 - 0.2 /100 WBC   Scan Slide    Collection Time: 06/01/24 10:29 PM    Specimen: Blood   Result Value Ref Range    RBC Morphology Normal Normal    WBC Morphology Normal Normal    Platelet Estimate Adequate Normal    Urinalysis With Culture If Indicated - Urine, Clean Catch    Collection Time: 06/01/24 10:54 PM    Specimen: Urine, Clean Catch   Result Value Ref Range    Color, UA Yellow Yellow, Straw    Appearance, UA Clear Clear    pH, UA 5.5 5.0 - 8.0    Specific Gravity, UA 1.017 1.005 - 1.030    Glucose, UA Negative Negative    Ketones, UA Trace (A) Negative    Bilirubin, UA Negative Negative    Blood, UA Negative Negative    Protein, UA 30 mg/dL (1+) (A) Negative    Leuk Esterase, UA Negative Negative    Nitrite, UA Negative Negative    Urobilinogen, UA 1.0 E.U./dL 0.2 - 1.0 E.U./dL   Urine Drug Screen - Urine, Clean Catch    Collection Time: 06/01/24 10:54 PM    Specimen: Urine, Clean Catch   Result Value Ref Range    THC, Screen, Urine Negative Negative    Phencyclidine (PCP), Urine Negative Negative    Cocaine Screen, Urine Negative Negative    Methamphetamine, Ur Negative Negative    Opiate Screen Positive (A) Negative    Amphetamine Screen, Urine Negative Negative    Benzodiazepine Screen, Urine Negative Negative    Tricyclic Antidepressants Screen Negative Negative    Methadone Screen, Urine Negative Negative    Barbiturates Screen, Urine Negative Negative    Oxycodone Screen, Urine Negative Negative    Buprenorphine, Screen, Urine Negative Negative   Fentanyl, Urine - Urine, Clean Catch    Collection Time: 06/01/24 10:54 PM    Specimen: Urine, Clean Catch   Result Value Ref Range    Fentanyl, Urine Negative Negative   Urinalysis, Microscopic Only - Urine, Clean Catch    Collection Time: 06/01/24 10:54 PM    Specimen: Urine, Clean Catch   Result Value Ref Range    RBC, UA None Seen None Seen, 0-2 /HPF    WBC, UA 0-2 None Seen, 0-2 /HPF    Bacteria, UA None Seen None Seen /HPF    Squamous Epithelial Cells, UA None Seen None Seen, 0-2 /HPF    Hyaline Casts, UA None Seen None Seen /LPF    Methodology Manual Light Microscopy    Single High Sensitivity Troponin T    Collection Time: 06/02/24  1:56 AM    Specimen: Blood    Result Value Ref Range    HS Troponin T 6 <22 ng/L   Ethanol    Collection Time: 06/02/24  1:56 AM    Specimen: Blood   Result Value Ref Range    Ethanol <10 0 - 10 mg/dL    Ethanol % <0.010 %   C-reactive Protein    Collection Time: 06/02/24  1:56 AM    Specimen: Blood   Result Value Ref Range    C-Reactive Protein <0.30 0.00 - 0.50 mg/dL   Acetaminophen Level    Collection Time: 06/02/24  1:56 AM    Specimen: Blood   Result Value Ref Range    Acetaminophen <5.0 0.0 - 30.0 mcg/mL   Procalcitonin    Collection Time: 06/02/24  3:00 AM    Specimen: Blood   Result Value Ref Range    Procalcitonin 0.07 0.00 - 0.25 ng/mL   Lactic Acid, Plasma    Collection Time: 06/02/24  3:00 AM    Specimen: Blood   Result Value Ref Range    Lactate 0.8 0.5 - 2.0 mmol/L       If labs were ordered, I independently reviewed the results and considered them in treating the patient.        RADIOLOGY  CT Angiogram Neck    Result Date: 6/2/2024  FINAL REPORT TECHNIQUE: null CLINICAL HISTORY: dizziness COMPARISON: null FINDINGS: CT Angiography Neck W Contrast 3D Postprocessing COMPARISON: None FINDINGS: Detail limited by artifacts. No occlusion, hemodynamically significant stenosis, or dissection in the bilateral common carotid arteries. No occlusion, hemodynamically significant stenosis, or dissection in the bilateral internal carotid arteries (0-49 percent). No occlusion, hemodynamically significant stenosis, or dissection in the bilateral vertebral arteries. No acute fracture.     IMPRESSION: No occlusion or hemodynamically significant stenosis in the carotid or vertebral arteries. No dissection. Authenticated and Electronically Signed by Star Hurtado MD on 06/02/2024 03:53:26 AM    CT Angiogram Head    Result Date: 6/2/2024  FINAL REPORT TECHNIQUE: null CLINICAL HISTORY: dizziness COMPARISON: null FINDINGS: CT Angiography Head W Contrast 3D Postprocessing COMPARISON: CT/SR - CT HEAD WO CONTRAST - 06/02/2024 01:16 AM EDT FINDINGS: No  occlusion or hemodynamically significant stenosis in the internal carotid arteries. No occlusion or hemodynamically significant stenosis in the middle cerebral arteries. No occlusion or hemodynamically significant stenosis in the anterior cerebral arteries. No occlusion or hemodynamically significant stenosis in the bilateral intracranial vertebral arteries. No occlusion or hemodynamically significant stenosis in the basilar artery. No aneurysm.     IMPRESSION: No intracranial large artery occlusion or hemodynamically significant stenosis. Authenticated and Electronically Signed by Star Hurtado MD on 06/02/2024 03:51:56 AM    CT Abdomen Pelvis Without Contrast    Result Date: 6/2/2024  FINAL REPORT TECHNIQUE: null CLINICAL HISTORY: n/v, weakness COMPARISON: null FINDINGS: CT Abdomen and Pelvis WO Contrast COMPARISON: None FINDINGS: Calcified granulomas in the liver and spleen. Diffusely hypodense liver consistent with hepatic steatosis. Mildly nodular liver contours suggestive of hepatic cirrhosis. Normal kidneys. Normal adrenal glands. Normal pancreas. No visible gallstones. No biliary dilation. No evidence of bowel obstruction. Thickening of the walls of small bowel loops in the left abdomen. No pneumatosis. The appendix is not identified, however, no secondary signs of acute appendicitis. Mild diffuse bladder wall thickening. No ascites. No pneumoperitoneum. No lymphadenopathy. No acute fracture. Degenerative and postsurgical changes in the spine. No abdominal aortic aneurysm.     IMPRESSION: Small bowel wall thickening, which could be due to enteritis or underdistention. Possible cystitis. Nonemergent/incidental findings above. Authenticated and Electronically Signed by Star Hurtado MD on 06/02/2024 02:23:45 AM    CT Head Without Contrast    Result Date: 6/2/2024  FINAL REPORT TECHNIQUE: null CLINICAL HISTORY: dizziness, headache COMPARISON: null FINDINGS: CT Head WO Contrast COMPARISON: None FINDINGS: No acute  intracranial hemorrhage. No evidence of acute infarction. No mass-effect or midline shift. No hydrocephalus. Fluid in the paranasal sinuses. The mastoid air cells are clear. The visible orbits are normal. No acute fracture. Unremarkable soft tissues.     IMPRESSION: No acute intracranial findings. Authenticated and Electronically Signed by Star Hurtado MD on 06/02/2024 02:19:38 AM         PROCEDURES    Procedures    Interpretations    O2 Sat: The patients oxygen saturation was 97% on Room Air.  This was independently interpreted by me as Normal    EKG: I reviewed and independently interpreted the EKG as sinus tachycardia rate of 103.  No ischemic changes.    Radiology: I ordered and independently reviewed the above noted radiographic studies.  I viewed images of CT Head which showed No intracranial hemorrhage per my independent interpretation. See radiologist's dictation for official interpretation.       MEDICATIONS GIVEN IN ER    Medications   sodium chloride 0.9 % flush 10 mL (has no administration in time range)   sodium chloride 0.9 % bolus 1,000 mL (1,000 mL Intravenous New Bag 6/2/24 0323)   sodium chloride 0.9 % bolus 1,000 mL (0 mL Intravenous Stopped 6/1/24 2304)   ondansetron (ZOFRAN) injection 4 mg (4 mg Intravenous Given 6/1/24 2234)   ketorolac (TORADOL) injection 30 mg (30 mg Intravenous Given 6/1/24 2338)   metoclopramide (REGLAN) injection 10 mg (10 mg Intravenous Given 6/2/24 0046)   diphenhydrAMINE (BENADRYL) injection 25 mg (25 mg Intravenous Given 6/2/24 0046)   aluminum-magnesium hydroxide-simethicone (MAALOX MAX) 400-400-40 MG/5ML 15 mL suspension ( Oral Given 6/2/24 0148)   Lidocaine Viscous HCl (XYLOCAINE) 2 % solution 10 mL (10 mL Mouth/Throat Given 6/2/24 0148)   pantoprazole (PROTONIX) injection 40 mg (40 mg Intravenous Given 6/2/24 0148)   magnesium sulfate 2g/50 mL (PREMIX) infusion (2 g Intravenous New Bag 6/2/24 0300)   iopamidol (ISOVUE-300) 61 % injection 100 mL (100 mL Intravenous  Given 6/2/24 4832)   dexAMETHasone sodium phosphate injection 10 mg (10 mg Intravenous Given 6/2/24 3297)         MEDICAL DECISION MAKING, PROGRESS, and CONSULTS    All labs, if obtained, have been independently reviewed by me.  All radiology studies, if obtained, have been reviewed by me and the radiologist dictating the report.  All EKG's, if obtained, have been independently viewed and interpreted by me      Discussion below represents my analysis of pertinent findings related to patient's condition, differential diagnosis, treatment plan and final disposition.    Orders placed during this visit:  Orders Placed This Encounter   Procedures    CT Head Without Contrast    CT Abdomen Pelvis Without Contrast    CT Angiogram Head    CT Angiogram Neck    [place] Draw    Comprehensive Metabolic Panel    Lipase    Urinalysis With Culture If Indicated - Urine, Clean Catch    Single High Sensitivity Troponin T    Magnesium    Urine Drug Screen - Urine, Clean Catch    CBC Auto Differential    Fentanyl, Urine - Urine, Clean Catch    Scan Slide    Urinalysis, Microscopic Only - Urine, Clean Catch    Single High Sensitivity Troponin T    Ethanol    Procalcitonin    C-reactive Protein    Hepatitis Panel, Acute    Acetaminophen Level    Lactic Acid, Plasma    POC Glucose Once    ECG 12 Lead Other; dizziness and intermittent chest pain    Insert peripheral IV    Initiate Observation Status    Green Top (Gel)    Lavender Top    Gold Top - SST    Light Blue Top    CBC & Differential     AS OF 04:20 EDT VITALS:    BP - 122/82  HR - 77  TEMP - 98.3 °F (36.8 °C) (Oral)  O2 SATS - 94%    I reviewed the patients prescription monitoring report if available prior to discharge    DIAGNOSIS  Final diagnoses:   Dizziness         DISPOSITION  ED Disposition       ED Disposition   Decision to Admit    Condition   --    Comment   Level of Care: Telemetry [5]   Diagnosis: Dizziness [812866]   Admitting Physician: ISSAC WARNER [[id number]]    Attending Physician: TAWANDA CUELLO [[id number]]                     Please note that portions of this document were completed with voice recognition software.        Tawanda Cuello,   06/02/24 0428

## 2024-06-03 VITALS
SYSTOLIC BLOOD PRESSURE: 126 MMHG | WEIGHT: 249.78 LBS | HEART RATE: 77 BPM | DIASTOLIC BLOOD PRESSURE: 90 MMHG | OXYGEN SATURATION: 95 % | TEMPERATURE: 97.9 F | BODY MASS INDEX: 37 KG/M2 | RESPIRATION RATE: 18 BRPM | HEIGHT: 69 IN

## 2024-06-03 LAB
ALBUMIN SERPL-MCNC: 3.6 G/DL (ref 3.5–5.2)
ALBUMIN/GLOB SERPL: 1.2 G/DL
ALP SERPL-CCNC: 73 U/L (ref 39–117)
ALT SERPL W P-5'-P-CCNC: 114 U/L (ref 1–41)
ANION GAP SERPL CALCULATED.3IONS-SCNC: 8.1 MMOL/L (ref 5–15)
AST SERPL-CCNC: 50 U/L (ref 1–40)
BILIRUB SERPL-MCNC: 0.8 MG/DL (ref 0–1.2)
BUN SERPL-MCNC: 13 MG/DL (ref 6–20)
BUN/CREAT SERPL: 17.1 (ref 7–25)
CALCIUM SPEC-SCNC: 8.6 MG/DL (ref 8.6–10.5)
CHLORIDE SERPL-SCNC: 101 MMOL/L (ref 98–107)
CO2 SERPL-SCNC: 21.9 MMOL/L (ref 22–29)
CREAT SERPL-MCNC: 0.76 MG/DL (ref 0.76–1.27)
DEPRECATED RDW RBC AUTO: 42.5 FL (ref 37–54)
EGFRCR SERPLBLD CKD-EPI 2021: 108.1 ML/MIN/1.73
ERYTHROCYTE [DISTWIDTH] IN BLOOD BY AUTOMATED COUNT: 12.8 % (ref 12.3–15.4)
GLOBULIN UR ELPH-MCNC: 2.9 GM/DL
GLUCOSE BLDC GLUCOMTR-MCNC: 255 MG/DL (ref 70–130)
GLUCOSE BLDC GLUCOMTR-MCNC: 284 MG/DL (ref 70–130)
GLUCOSE SERPL-MCNC: 308 MG/DL (ref 65–99)
HCT VFR BLD AUTO: 40 % (ref 37.5–51)
HGB BLD-MCNC: 14.4 G/DL (ref 13–17.7)
MAGNESIUM SERPL-MCNC: 1.8 MG/DL (ref 1.6–2.6)
MCH RBC QN AUTO: 33.2 PG (ref 26.6–33)
MCHC RBC AUTO-ENTMCNC: 36 G/DL (ref 31.5–35.7)
MCV RBC AUTO: 92.2 FL (ref 79–97)
PLATELET # BLD AUTO: 121 10*3/MM3 (ref 140–450)
PMV BLD AUTO: 10.8 FL (ref 6–12)
POTASSIUM SERPL-SCNC: 4.3 MMOL/L (ref 3.5–5.2)
PROT SERPL-MCNC: 6.5 G/DL (ref 6–8.5)
RBC # BLD AUTO: 4.34 10*6/MM3 (ref 4.14–5.8)
SODIUM SERPL-SCNC: 131 MMOL/L (ref 136–145)
WBC NRBC COR # BLD AUTO: 16.1 10*3/MM3 (ref 3.4–10.8)

## 2024-06-03 PROCEDURE — G0378 HOSPITAL OBSERVATION PER HR: HCPCS

## 2024-06-03 PROCEDURE — 85027 COMPLETE CBC AUTOMATED: CPT | Performed by: FAMILY MEDICINE

## 2024-06-03 PROCEDURE — 63710000001 INSULIN LISPRO (HUMAN) PER 5 UNITS: Performed by: FAMILY MEDICINE

## 2024-06-03 PROCEDURE — 63710000001 INSULIN GLARGINE PER 5 UNITS: Performed by: FAMILY MEDICINE

## 2024-06-03 PROCEDURE — 97161 PT EVAL LOW COMPLEX 20 MIN: CPT

## 2024-06-03 PROCEDURE — 83735 ASSAY OF MAGNESIUM: CPT | Performed by: FAMILY MEDICINE

## 2024-06-03 PROCEDURE — 99238 HOSP IP/OBS DSCHRG MGMT 30/<: CPT | Performed by: FAMILY MEDICINE

## 2024-06-03 PROCEDURE — 82948 REAGENT STRIP/BLOOD GLUCOSE: CPT | Performed by: FAMILY MEDICINE

## 2024-06-03 PROCEDURE — 82948 REAGENT STRIP/BLOOD GLUCOSE: CPT

## 2024-06-03 PROCEDURE — 80053 COMPREHEN METABOLIC PANEL: CPT | Performed by: FAMILY MEDICINE

## 2024-06-03 RX ORDER — MECLIZINE HCL 12.5 MG/1
12.5 TABLET ORAL 3 TIMES DAILY PRN
Qty: 21 TABLET | Refills: 0 | Status: SHIPPED | OUTPATIENT
Start: 2024-06-03 | End: 2024-06-10

## 2024-06-03 RX ORDER — NICOTINE 21 MG/24HR
1 PATCH, TRANSDERMAL 24 HOURS TRANSDERMAL
Qty: 14 PATCH | Refills: 0 | Status: SHIPPED | OUTPATIENT
Start: 2024-06-04 | End: 2024-06-18

## 2024-06-03 RX ORDER — ASPIRIN 81 MG/1
81 TABLET ORAL DAILY
Qty: 30 TABLET | Refills: 0 | Status: SHIPPED | OUTPATIENT
Start: 2024-06-04 | End: 2024-07-04

## 2024-06-03 RX ADMIN — Medication 10 ML: at 08:40

## 2024-06-03 RX ADMIN — Medication 1 PATCH: at 08:40

## 2024-06-03 RX ADMIN — INSULIN GLARGINE 10 UNITS: 100 INJECTION, SOLUTION SUBCUTANEOUS at 08:39

## 2024-06-03 RX ADMIN — INSULIN LISPRO 6 UNITS: 100 INJECTION, SOLUTION INTRAVENOUS; SUBCUTANEOUS at 08:39

## 2024-06-03 RX ADMIN — INSULIN LISPRO 6 UNITS: 100 INJECTION, SOLUTION INTRAVENOUS; SUBCUTANEOUS at 11:49

## 2024-06-03 RX ADMIN — ATORVASTATIN CALCIUM 40 MG: 40 TABLET, FILM COATED ORAL at 08:39

## 2024-06-03 RX ADMIN — ASPIRIN 81 MG: 81 TABLET, COATED ORAL at 08:39

## 2024-06-03 RX ADMIN — FLUOXETINE 20 MG: 20 CAPSULE ORAL at 08:39

## 2024-06-03 RX ADMIN — FENOFIBRATE 48 MG: 48 TABLET ORAL at 09:39

## 2024-06-03 RX ADMIN — BUTALBITAL, ACETAMINOPHEN, AND CAFFEINE 1 TABLET: 50; 325; 40 TABLET ORAL at 09:39

## 2024-06-03 RX ADMIN — BUSPIRONE HYDROCHLORIDE 5 MG: 5 TABLET ORAL at 08:39

## 2024-06-03 NOTE — CASE MANAGEMENT/SOCIAL WORK
Discharge Planning Assessment  [facility]     Patient Name: Albin Ivory  MRN: [id number]  Today's Date: 6/3/2024    Admit Date: 6/1/2024    Plan: Pt is planning to dc home. He has a friend named Debra who he sometimes stays with, along with her daughter, to provide care to her. Debra provides transportation; information per Jessica ROLNAD's dcp, her information did not transfer.   Discharge Needs Assessment    No documentation.                  Discharge Plan       Row Name 06/03/24 1218       Plan    Plan Pt is planning to dc home. He has a friend named Debra who he sometimes stays with, along with her daughter, to provide care to her. Debra provides transportation; information per Jessica ROLAND's dcp, her information did not transfer.                  Continued Care and Services - Admitted Since 6/1/2024    No active coordination exists for this encounter.       Expected Discharge Date and Time       Expected Discharge Date Expected Discharge Time    Jun 4, 2024            Demographic Summary       Row Name 06/03/24 1216       General Information    Arrived From emergency department    Reason for Consult discharge planning    Preferred Language English                   Functional Status       Row Name 06/03/24 1217       Functional Status    Usual Activity Tolerance moderate    Current Activity Tolerance moderate       Mental Status Summary    Recent Changes in Mental Status/Cognitive Functioning no changes       Employment/    Employment Status unemployed                   Psychosocial    No documentation.                  Abuse/Neglect    No documentation.                  Legal    No documentation.                  Substance Abuse    No documentation.                  Patient Forms    No documentation.                     Manasa Bolanos RN

## 2024-06-03 NOTE — CASE MANAGEMENT/SOCIAL WORK
Discharge Planning Assessment  [facility]     Patient Name: Albin Ivory  MRN: [id number]  Today's Date: 6/3/2024    Admit Date: 6/1/2024    Plan: Home with freinds   Discharge Needs Assessment    No documentation.                  Discharge Plan    No documentation.                 Continued Care and Services - Admitted Since 6/1/2024    No active coordination exists for this encounter.       Expected Discharge Date and Time       Expected Discharge Date Expected Discharge Time    Jun 4, 2024            Demographic Summary    No documentation.                  Functional Status    No documentation.                  Psychosocial    No documentation.                  Abuse/Neglect    No documentation.                  Legal    No documentation.                  Substance Abuse    No documentation.                  Patient Forms    No documentation.                     Manasa Bolanos RN

## 2024-06-03 NOTE — THERAPY DISCHARGE NOTE
Patient Name: Albin vIory  : 1970    MRN: [id number]                              Today's Date: 6/3/2024       Admit Date: 2024    Visit Dx:     ICD-10-CM ICD-9-CM   1. Dizziness  R42 780.4     Patient Active Problem List   Diagnosis    New onset type 2 diabetes mellitus    DKA (diabetic ketoacidosis)    Dizziness     Past Medical History:   Diagnosis Date    Back injury     Hypertension      Past Surgical History:   Procedure Laterality Date    BACK SURGERY        General Information       Row Name 24 1114          Physical Therapy Time and Intention    Document Type discharge evaluation/summary  -     Mode of Treatment physical therapy  -       Row Name 24 1114          General Information    Patient Profile Reviewed yes  -JR     Prior Level of Function independent:;community mobility  -     Existing Precautions/Restrictions fall  -     Barriers to Rehab none identified  -       Row Name 24 1114          Living Environment    People in Home friend(s)  -[facility] Name 24 1114          Home Main Entrance    Number of Stairs, Main Entrance none  -[facility] Name 24 1114          Stairs Within Home, Primary    Number of Stairs, Within Home, Primary none  -       Row Name 24 1114          Cognition    Orientation Status (Cognition) oriented x 4  -       Row Name 24 1114          Safety Issues, Functional Mobility    Comment, Safety Issues/Impairments (Mobility) Had been experiencing dizziness, but states that is now resolved.  -               User Key  (r) = Recorded By, (t) = Taken By, (c) = Cosigned By      Initials Name Provider Type     Sindi Alejandra, PT Physical Therapist                   Mobility       Row Name 24 1114          Bed Mobility    Bed Mobility bed mobility (all) activities  -     All Activities, [place] (Bed Mobility) independent  -       Row Name 24 1114          Sit-Stand Transfer    Sit-Stand  [place] (Transfers) independent  -JR     Assistive Device (Sit-Stand Transfers) other (see comments)  gait belt  -JR       Row Name 06/03/24 1114          Gait/Stairs (Locomotion)    [place] Level (Gait) independent  -JR     Assistive Device (Gait) other (see comments)  gait belt  -JR     Patient was able to Ambulate yes  -JR     Distance in Feet (Gait) 150  -JR     Deviations/Abnormal Patterns (Gait) antalgic  -JR     Comment, (Gait/Stairs) Pt has history including back surgery and states this is his normal gait pattern now  -JR               User Key  (r) = Recorded By, (t) = Taken By, (c) = Cosigned By      Initials Name Provider Type    Sindi Monteiro, PT Physical Therapist                   Obj/Interventions       Row Name 06/03/24 1114          Range of Motion Comprehensive    General Range of Motion no range of motion deficits identified  -[facility] Name 06/03/24 1114          Strength Comprehensive (MMT)    General Manual Muscle Testing (MMT) Assessment no strength deficits identified  -[facility] Name 06/03/24 1114          Balance    Balance Assessment sitting static balance;sitting dynamic balance;sit to stand dynamic balance;standing static balance;standing dynamic balance  -JR     Static Sitting Balance independent  -JR     Dynamic Sitting Balance independent  -JR     Position, Sitting Balance unsupported;sitting edge of bed  -JR     Sit to Stand Dynamic Balance independent  -JR     Static Standing Balance independent  -JR     Dynamic Standing Balance independent  -JR     Position/Device Used, Standing Balance other (see comments)  gait belt  -JR               User Key  (r) = Recorded By, (t) = Taken By, (c) = Cosigned By      Initials Name Provider Type    Sindi Monteiro PT Physical Therapist                   Goals/Plan    No documentation.                  Clinical Impression       Row Name 06/03/24 1114          Pain    Pretreatment Pain Rating 3/10  -JR     Posttreatment Pain  Rating 3/10  -JR     Pain Location - head  -JR     Additional Documentation Pain Scale: Numbers Pre/Post-Treatment (Group)  -JR       Row Name 06/03/24 1114          Plan of Care Review    Plan of Care Reviewed With patient  -JR     Progress no change  -JR     Outcome Evaluation PT evaluation is completed.  He demonstrated good AROM and strength in all extremities.  He is able to independently perform transfers and gait x 150 feet with antalgic gait pattern.  He is eager to go home and states that he is ready to go home.  We discussued BPPVs and outpatient services if needed in the future.  PT will sign off at this time.  -JR       Row Name 06/03/24 1114          Therapy Assessment/Plan (PT)    Patient/Family Therapy Goals Statement (PT) Patient is eager to go home  -JR     Criteria for Skilled Interventions Met (PT) no;no problems identified which require skilled intervention;patient/family refuse skilled intervention at this time  -JR     Therapy Frequency (PT) evaluation only  -       Row Name 06/03/24 1114          Positioning and Restraints    Pre-Treatment Position in bed  -JR     Post Treatment Position bed  -JR     In Bed supine;call light within reach;encouraged to call for assist;notified nsg  -JR               User Key  (r) = Recorded By, (t) = Taken By, (c) = Cosigned By      Initials Name Provider Type    JR Sindi Alejandra, PT Physical Therapist                   Outcome Measures       Row Name 06/03/24 1114 06/03/24 0840       How much help from another person do you currently need...    Turning from your back to your side while in flat bed without using bedrails? 4  -JR 4  -CW    Moving from lying on back to sitting on the side of a flat bed without bedrails? 4  -JR 4  -CW    Moving to and from a bed to a chair (including a wheelchair)? 4  -JR 4  -CW    Standing up from a chair using your arms (e.g., wheelchair, bedside chair)? 4  -JR 4  -CW    Climbing 3-5 steps with a railing? 4  -JR 4  -CW    To  walk in hospital room? 4  - 4  -CW    AM-PAC 6 Clicks Score (PT) 24  - 24  -CW    Highest Level of Mobility Goal 8 --> Walked 250 feet or more  - 8 --> Walked 250 feet or more  -CW      Row Name 06/03/24 1114          Functional Assessment    Outcome Measure Options AM-PAC 6 Clicks Basic Mobility (PT)  -               User Key  (r) = Recorded By, (t) = Taken By, (c) = Cosigned By      Initials Name Provider Type    JR Sindi Alejandra, PT Physical Therapist    Swapna Villagran, RN Registered Nurse                  Physical Therapy Education       Title: PT OT SLP Therapies (Done)       Topic: Physical Therapy (Done)       Point: Mobility training (Done)       Learning Progress Summary             Patient Acceptance, E,TB, VU by  at 6/3/2024 2124    Comment: Importance of mobility and availability of outpatient treatment for dizziness                                         User Key       Initials Effective Dates Name Provider Type [facility] 08/22/23 -  Sindi Alejandra, PT Physical Therapist PT                  PT Recommendation and Plan     Plan of Care Reviewed With: patient  Progress: no change  Outcome Evaluation: PT evaluation is completed.  He demonstrated good AROM and strength in all extremities.  He is able to independently perform transfers and gait x 150 feet with antalgic gait pattern.  He is eager to go home and states that he is ready to go home.  We discussued BPPVs and outpatient services if needed in the future.  PT will sign off at this time.     Time Calculation:   PT Evaluation Complexity  History, PT Evaluation Complexity: 1-2 personal factors and/or comorbidities  Examination of Body Systems (PT Eval Complexity): 1-2 elements  Clinical Presentation (PT Evaluation Complexity): evolving  Clinical Decision Making (PT Evaluation Complexity): low complexity  Overall Complexity (PT Evaluation Complexity): low complexity     PT Charges       Row Name 06/03/24 1114             Time Calculation     Start Time 1114  -JR      PT Received On 06/03/24  -JR         Untimed Charges    PT Eval/Re-eval Minutes 45  -JR         Total Minutes    Untimed Charges Total Minutes 45  -JR       Total Minutes 45  -JR                User Key  (r) = Recorded By, (t) = Taken By, (c) = Cosigned By      Initials Name Provider Type    Sindi Monteiro, PT Physical Therapist                  Therapy Charges for Today       Code Description Service Date Service Provider Modifiers Qty    87784893825 HC PT EVAL LOW COMPLEXITY 3 6/3/2024 Sindi Alejandra, PT GP 1            PT G-Codes  Outcome Measure Options: AM-PAC 6 Clicks Basic Mobility (PT)  AM-PAC 6 Clicks Score (PT): 24    PT Discharge Summary  Anticipated Discharge Disposition (PT): home    Sindi Alejandra PT  6/3/2024

## 2024-06-03 NOTE — DISCHARGE INSTRUCTIONS
- Take meclizine as needed as prescribed for dizziness  - Avoid smoking as discussed, use nicotine patch  -Continue aspirin as prescribed daily.  -Follow-up with your primary care provider in 2 to 3 days for recheck and continued care. Referral to neurology/cardiology per PCP if still having symptoms or recurrent.  -Follow-up with GI for hepatitis C.

## 2024-06-03 NOTE — CASE MANAGEMENT/SOCIAL WORK
Case Management Discharge Note                Selected Continued Care - Admitted Since 6/1/2024       Destination    No services have been selected for the patient.                Durable Medical Equipment    No services have been selected for the patient.                Dialysis/Infusion    No services have been selected for the patient.                Home Medical Care    No services have been selected for the patient.                Therapy    No services have been selected for the patient.                Community Resources Coordination complete.      Service Provider Selected Services Address Phone Fax Patient Preferred    [facility] PATIENTS ONLY [facility] [address] [place] [address] [phone] -- --              Community & DME    No services have been selected for the patient.                    Transportation Services  Private: Car    Final Discharge Disposition Code: 01 - home or self-care
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
## 2024-06-03 NOTE — PLAN OF CARE
Problem: Adult Inpatient Plan of Care  Goal: Plan of Care Review  Outcome: Ongoing, Progressing  Goal: Patient-Specific Goal (Individualized)  Outcome: Ongoing, Progressing  Goal: Absence of Hospital-Acquired Illness or Injury  Outcome: Ongoing, Progressing  Intervention: Identify and Manage Fall Risk  Recent Flowsheet Documentation  Taken 6/3/2024 0000 by Brisa Win RN  Safety Promotion/Fall Prevention:   activity supervised   assistive device/personal items within reach   clutter free environment maintained   safety round/check completed  Taken 6/2/2024 2200 by Brisa Win RN  Safety Promotion/Fall Prevention:   activity supervised   assistive device/personal items within reach   clutter free environment maintained   safety round/check completed  Taken 6/2/2024 2000 by Brisa Win RN  Safety Promotion/Fall Prevention:   activity supervised   assistive device/personal items within reach   clutter free environment maintained   safety round/check completed  Intervention: Prevent Skin Injury  Recent Flowsheet Documentation  Taken 6/3/2024 0000 by Brisa Win RN  Body Position:   position changed independently   supine, legs elevated  Taken 6/2/2024 2200 by Brisa Win RN  Body Position:   position changed independently   side-lying   right  Taken 6/2/2024 2000 by Brisa Win RN  Body Position:   position changed independently   sitting up in bed  Intervention: Prevent and Manage VTE (Venous Thromboembolism) Risk  Recent Flowsheet Documentation  Taken 6/3/2024 0000 by Brisa Win RN  Activity Management: activity minimized  Taken 6/2/2024 2200 by Brisa Win RN  Activity Management: activity encouraged  Taken 6/2/2024 2000 by Brisa Win RN  Activity Management: activity encouraged  Intervention: Prevent Infection  Recent Flowsheet Documentation  Taken 6/3/2024 0000 by Brisa Win RN  Infection Prevention:   environmental surveillance performed    rest/sleep promoted  Taken 6/2/2024 2200 by Brisa Win RN  Infection Prevention: environmental surveillance performed  Taken 6/2/2024 2000 by Brisa Win RN  Infection Prevention: environmental surveillance performed  Goal: Optimal Comfort and Wellbeing  Outcome: Ongoing, Progressing  Intervention: Monitor Pain and Promote Comfort  Recent Flowsheet Documentation  Taken 6/2/2024 2200 by Brisa Win RN  Pain Management Interventions: quiet environment facilitated  Intervention: Provide Person-Centered Care  Recent Flowsheet Documentation  Taken 6/2/2024 2200 by Brisa Win RN  Trust Relationship/Rapport:   care explained   choices provided   emotional support provided  Goal: Readiness for Transition of Care  Outcome: Ongoing, Progressing     Problem: Nausea and Vomiting  Goal: Fluid and Electrolyte Balance  Outcome: Ongoing, Progressing     Problem: Pain Acute  Goal: Acceptable Pain Control and Functional Ability  Outcome: Ongoing, Progressing  Intervention: Prevent or Manage Pain  Recent Flowsheet Documentation  Taken 6/2/2024 2200 by Brisa Win RN  Medication Review/Management: medications reviewed  Taken 6/2/2024 2000 by Brisa Win RN  Medication Review/Management: medications reviewed  Intervention: Develop Pain Management Plan  Recent Flowsheet Documentation  Taken 6/2/2024 2200 by Brisa Win RN  Pain Management Interventions: quiet environment facilitated   Goal Outcome Evaluation:      Plan of care reviewed with patient. Patient has rested intermittently tonight. He has required treatment for an ongoing headache. But no other complaints so far this shift.

## 2024-06-03 NOTE — PLAN OF CARE
Goal Outcome Evaluation:  Plan of Care Reviewed With: patient        Progress: no change  Outcome Evaluation: PT evaluation is completed.  He demonstrated good AROM and strength in all extremities.  He is able to independently perform transfers and gait x 150 feet with antalgic gait pattern.  He is eager to go home and states that he is ready to go home.  We discussued BPPVs and outpatient services if needed in the future.  PT will sign off at this time.      Anticipated Discharge Disposition (PT): home

## 2024-06-03 NOTE — CASE MANAGEMENT/SOCIAL WORK
Met with pt, food bag given. Explained insurance would not cover a cane for DME and recommended inexpensive sources to check. Pt ready for dc.

## 2024-11-05 ENCOUNTER — TELEPHONE (OUTPATIENT)
Dept: PHARMACY | Facility: HOSPITAL | Age: 54
End: 2024-11-05

## 2024-11-05 DIAGNOSIS — B18.2 CHRONIC HEPATITIS C WITHOUT HEPATIC COMA (HCC): Primary | ICD-10-CM

## 2024-11-05 NOTE — TELEPHONE ENCOUNTER
Called pt and scheduled Hepatitis C appointment with Lily SANTOYO for 11/11/24 @ 1:30pm.    Referral and notes/labs from PCP scanned into media tab.    Maxwell Phillips, Pharmacist

## 2024-11-11 ENCOUNTER — OFFICE VISIT (OUTPATIENT)
Dept: PRIMARY CARE CLINIC | Age: 54
End: 2024-11-11
Payer: MEDICAID

## 2024-11-11 VITALS
RESPIRATION RATE: 16 BRPM | HEART RATE: 101 BPM | TEMPERATURE: 97.6 F | DIASTOLIC BLOOD PRESSURE: 82 MMHG | SYSTOLIC BLOOD PRESSURE: 137 MMHG | OXYGEN SATURATION: 97 % | HEIGHT: 72 IN | BODY MASS INDEX: 34.38 KG/M2 | WEIGHT: 253.8 LBS

## 2024-11-11 DIAGNOSIS — F43.21 GRIEF AT LOSS OF CHILD: ICD-10-CM

## 2024-11-11 DIAGNOSIS — B18.2 CHRONIC HEPATITIS C WITHOUT HEPATIC COMA (HCC): Primary | ICD-10-CM

## 2024-11-11 DIAGNOSIS — Z63.4 GRIEF AT LOSS OF CHILD: ICD-10-CM

## 2024-11-11 PROCEDURE — 3075F SYST BP GE 130 - 139MM HG: CPT | Performed by: NURSE PRACTITIONER

## 2024-11-11 PROCEDURE — 3079F DIAST BP 80-89 MM HG: CPT | Performed by: NURSE PRACTITIONER

## 2024-11-11 PROCEDURE — 99213 OFFICE O/P EST LOW 20 MIN: CPT | Performed by: NURSE PRACTITIONER

## 2024-11-11 RX ORDER — ATORVASTATIN CALCIUM 80 MG/1
80 TABLET, FILM COATED ORAL DAILY
COMMUNITY
Start: 2024-10-17

## 2024-11-11 RX ORDER — INSULIN DETEMIR 100 [IU]/ML
20 INJECTION, SOLUTION SUBCUTANEOUS NIGHTLY
COMMUNITY
Start: 2024-09-20

## 2024-11-11 RX ORDER — ERGOCALCIFEROL 1.25 MG/1
50000 CAPSULE, LIQUID FILLED ORAL WEEKLY
COMMUNITY
Start: 2024-10-17

## 2024-11-11 RX ORDER — IBUPROFEN 800 MG/1
800 TABLET, FILM COATED ORAL 3 TIMES DAILY
COMMUNITY
Start: 2024-10-17

## 2024-11-11 RX ORDER — LANCING DEVICE
EACH MISCELLANEOUS
COMMUNITY
Start: 2024-09-20

## 2024-11-11 RX ORDER — BLOOD-GLUCOSE METER
EACH MISCELLANEOUS
COMMUNITY
Start: 2024-10-17

## 2024-11-11 RX ORDER — MULTIVIT,CALC,MINS/IRON/FOLIC 9MG-400MCG
TABLET ORAL
COMMUNITY
Start: 2024-10-17

## 2024-11-11 RX ORDER — PEN NEEDLE, DIABETIC 31 GX5/16"
1 NEEDLE, DISPOSABLE MISCELLANEOUS DAILY
COMMUNITY
Start: 2024-10-17

## 2024-11-11 RX ORDER — BLOOD-GLUCOSE METER
1 EACH MISCELLANEOUS DAILY
COMMUNITY
Start: 2024-09-20

## 2024-11-11 RX ORDER — LISINOPRIL 5 MG/1
5 TABLET ORAL DAILY
COMMUNITY
Start: 2024-10-17

## 2024-11-11 RX ORDER — INSULIN ASPART 100 [IU]/ML
5 INJECTION, SOLUTION INTRAVENOUS; SUBCUTANEOUS
COMMUNITY
Start: 2024-09-20

## 2024-11-11 RX ORDER — BLOOD SUGAR DIAGNOSTIC
1 STRIP MISCELLANEOUS DAILY
COMMUNITY
Start: 2024-10-17

## 2024-11-11 RX ORDER — BUSPIRONE HYDROCHLORIDE 5 MG/1
5 TABLET ORAL 3 TIMES DAILY
COMMUNITY

## 2024-11-11 RX ORDER — LEVOTHYROXINE SODIUM 25 UG/1
25 TABLET ORAL DAILY
COMMUNITY
Start: 2024-10-17

## 2024-11-11 RX ORDER — LIRAGLUTIDE 6 MG/ML
1.8 INJECTION SUBCUTANEOUS DAILY
COMMUNITY
Start: 2024-10-17

## 2024-11-11 SDOH — ECONOMIC STABILITY: FOOD INSECURITY: WITHIN THE PAST 12 MONTHS, YOU WORRIED THAT YOUR FOOD WOULD RUN OUT BEFORE YOU GOT MONEY TO BUY MORE.: NEVER TRUE

## 2024-11-11 SDOH — SOCIAL STABILITY - SOCIAL INSECURITY: DISSAPEARANCE AND DEATH OF FAMILY MEMBER: Z63.4

## 2024-11-11 SDOH — ECONOMIC STABILITY: FOOD INSECURITY: WITHIN THE PAST 12 MONTHS, THE FOOD YOU BOUGHT JUST DIDN'T LAST AND YOU DIDN'T HAVE MONEY TO GET MORE.: NEVER TRUE

## 2024-11-11 SDOH — ECONOMIC STABILITY: INCOME INSECURITY: HOW HARD IS IT FOR YOU TO PAY FOR THE VERY BASICS LIKE FOOD, HOUSING, MEDICAL CARE, AND HEATING?: NOT HARD AT ALL

## 2024-11-11 ASSESSMENT — PATIENT HEALTH QUESTIONNAIRE - PHQ9
4. FEELING TIRED OR HAVING LITTLE ENERGY: NEARLY EVERY DAY
6. FEELING BAD ABOUT YOURSELF - OR THAT YOU ARE A FAILURE OR HAVE LET YOURSELF OR YOUR FAMILY DOWN: NOT AT ALL
SUM OF ALL RESPONSES TO PHQ QUESTIONS 1-9: 21
1. LITTLE INTEREST OR PLEASURE IN DOING THINGS: NEARLY EVERY DAY
SUM OF ALL RESPONSES TO PHQ QUESTIONS 1-9: 21
SUM OF ALL RESPONSES TO PHQ9 QUESTIONS 1 & 2: 6
8. MOVING OR SPEAKING SO SLOWLY THAT OTHER PEOPLE COULD HAVE NOTICED. OR THE OPPOSITE, BEING SO FIGETY OR RESTLESS THAT YOU HAVE BEEN MOVING AROUND A LOT MORE THAN USUAL: NEARLY EVERY DAY
5. POOR APPETITE OR OVEREATING: NEARLY EVERY DAY
3. TROUBLE FALLING OR STAYING ASLEEP: NEARLY EVERY DAY
7. TROUBLE CONCENTRATING ON THINGS, SUCH AS READING THE NEWSPAPER OR WATCHING TELEVISION: NEARLY EVERY DAY
2. FEELING DOWN, DEPRESSED OR HOPELESS: NEARLY EVERY DAY
9. THOUGHTS THAT YOU WOULD BE BETTER OFF DEAD, OR OF HURTING YOURSELF: NOT AT ALL
10. IF YOU CHECKED OFF ANY PROBLEMS, HOW DIFFICULT HAVE THESE PROBLEMS MADE IT FOR YOU TO DO YOUR WORK, TAKE CARE OF THINGS AT HOME, OR GET ALONG WITH OTHER PEOPLE: SOMEWHAT DIFFICULT
SUM OF ALL RESPONSES TO PHQ QUESTIONS 1-9: 21
SUM OF ALL RESPONSES TO PHQ QUESTIONS 1-9: 21

## 2024-11-11 ASSESSMENT — ENCOUNTER SYMPTOMS
ALLERGIC/IMMUNOLOGIC NEGATIVE: 1
GASTROINTESTINAL NEGATIVE: 1
EYES NEGATIVE: 1
RESPIRATORY NEGATIVE: 1

## 2024-11-11 ASSESSMENT — COLUMBIA-SUICIDE SEVERITY RATING SCALE - C-SSRS
1. WITHIN THE PAST MONTH, HAVE YOU WISHED YOU WERE DEAD OR WISHED YOU COULD GO TO SLEEP AND NOT WAKE UP?: NO
2. HAVE YOU ACTUALLY HAD ANY THOUGHTS OF KILLING YOURSELF?: NO
6. HAVE YOU EVER DONE ANYTHING, STARTED TO DO ANYTHING, OR PREPARED TO DO ANYTHING TO END YOUR LIFE?: NO

## 2024-11-11 NOTE — PROGRESS NOTES
Chief Complaint   Patient presents with    Hepatitis C       Have you seen any other physician or provider since your last visit yes - [facility]    Have you had any other diagnostic tests since your last visit? yes - labs    Have you changed or stopped any medications since your last visit? no     I have recommended that this patient have a immunization for pneumonia and sigmoidoscopy but he due to refusal reason: not comfortable with test   I have discussed the risks and benefits of this examination with him. The patient verbalizes understanding.  Provider will be informed of refusal.       SUBJECTIVE:    Patient ID:Titi Valencia is a 54 y.o. male.    Chief Complaint   Patient presents with    Hepatitis C     HPI:  Patient is here for Hepatitis C that he has had for 7 years. He has done Heroin in the past.   He started with substance use when he was [age].  He used for about 6-7 years.  He was using cocaine, and marijuana.  He then started on heroin.  He has been sober for 8 years.  He does smoke.   He does have hep c pcr in his record in 2021.  Viral load of 758,028.    He recently found out he is diabetic. He has hypothyroid, htn, anxiety, and history of staph infection in a surgical wound.   His son committed suicide March of this year.  He has really struggled since.      Patient's medications, allergies, past medical, surgical, social and family histories were reviewed and updated as appropriate.          Review of Systems   Constitutional: Negative.    HENT: Negative.     Eyes: Negative.    Respiratory: Negative.     Cardiovascular: Negative.    Gastrointestinal: Negative.    Endocrine: Negative.    Genitourinary: Negative.    Musculoskeletal: Negative.    Skin: Negative.    Allergic/Immunologic: Negative.    Neurological: Negative.    Hematological: Negative.    Psychiatric/Behavioral:  Positive for sleep disturbance. The patient is nervous/anxious.        OBJECTIVE:  /82 (Site: Left Upper Arm, Position:

## 2024-11-12 ENCOUNTER — HOSPITAL ENCOUNTER (OUTPATIENT)
Facility: HOSPITAL | Age: 54
Discharge: HOME | End: 2024-11-12
Payer: MEDICAID

## 2024-11-12 LAB
ALBUMIN SERPL-MCNC: 4.1 G/DL (ref 3.4–4.8)
ALBUMIN/GLOB SERPL: 1 {RATIO} (ref 0.8–2)
ALP SERPL-CCNC: 90 U/L (ref 25–100)
ALT SERPL-CCNC: 155 U/L (ref 4–36)
ANION GAP SERPL CALCULATED.3IONS-SCNC: 15 MMOL/L (ref 3–16)
AST SERPL-CCNC: 103 U/L (ref 8–33)
BILIRUB SERPL-MCNC: 1.3 MG/DL (ref 0.3–1.2)
BUN SERPL-MCNC: 12 MG/DL (ref 6–20)
CALCIUM SERPL-MCNC: 9.3 MG/DL (ref 8.5–10.5)
CHLORIDE SERPL-SCNC: 102 MMOL/L (ref 98–107)
CO2 SERPL-SCNC: 21 MMOL/L (ref 20–30)
CREAT SERPL-MCNC: 0.7 MG/DL (ref 0.4–1.2)
ERYTHROCYTE [DISTWIDTH] IN BLOOD BY AUTOMATED COUNT: 14 % (ref 11–16)
GFR SERPLBLD CREATININE-BSD FMLA CKD-EPI: >90 ML/MIN/{1.73_M2}
GLOBULIN SER CALC-MCNC: 4 G/DL
GLUCOSE SERPL-MCNC: 131 MG/DL (ref 74–106)
HCT VFR BLD AUTO: 48.4 % (ref 40–54)
HGB BLD-MCNC: 17 G/DL (ref 13–18)
INR PPP: 1.01 (ref 0.9–1.1)
MCH RBC QN AUTO: 32.8 PG (ref 27–32)
MCHC RBC AUTO-ENTMCNC: 35.1 G/DL (ref 31–35)
MCV RBC AUTO: 93.3 FL (ref 80–100)
PLATELET # BLD AUTO: 137 K/UL (ref 150–400)
PMV BLD AUTO: 9.9 FL (ref 6–10)
POTASSIUM SERPL-SCNC: 3.9 MMOL/L (ref 3.4–5.1)
PROT SERPL-MCNC: 8.1 G/DL (ref 6.4–8.3)
PROTHROMBIN TIME: 13.2 SEC (ref 12.1–14)
RBC # BLD AUTO: 5.19 M/UL (ref 4.5–6)
SODIUM SERPL-SCNC: 138 MMOL/L (ref 136–145)
WBC # BLD AUTO: 6.6 K/UL (ref 4–11)

## 2024-11-12 PROCEDURE — 87522 HEPATITIS C REVRS TRNSCRPJ: CPT

## 2024-11-12 PROCEDURE — 84520 ASSAY OF UREA NITROGEN: CPT

## 2024-11-12 PROCEDURE — 80053 COMPREHEN METABOLIC PANEL: CPT

## 2024-11-12 PROCEDURE — 86708 HEPATITIS A ANTIBODY: CPT

## 2024-11-12 PROCEDURE — 85027 COMPLETE CBC AUTOMATED: CPT

## 2024-11-12 PROCEDURE — 86706 HEP B SURFACE ANTIBODY: CPT

## 2024-11-12 PROCEDURE — 80074 ACUTE HEPATITIS PANEL: CPT

## 2024-11-12 PROCEDURE — 84450 TRANSFERASE (AST) (SGOT): CPT

## 2024-11-12 PROCEDURE — 36415 COLL VENOUS BLD VENIPUNCTURE: CPT

## 2024-11-12 PROCEDURE — 86702 HIV-2 ANTIBODY: CPT

## 2024-11-12 PROCEDURE — 83883 ASSAY NEPHELOMETRY NOT SPEC: CPT

## 2024-11-12 PROCEDURE — 86701 HIV-1ANTIBODY: CPT

## 2024-11-12 PROCEDURE — 85610 PROTHROMBIN TIME: CPT

## 2024-11-12 PROCEDURE — 87390 HIV-1 AG IA: CPT

## 2024-11-12 PROCEDURE — 84460 ALANINE AMINO (ALT) (SGPT): CPT

## 2024-11-12 PROCEDURE — 82977 ASSAY OF GGT: CPT

## 2024-11-13 LAB
HAV IGM SERPL QL IA: ABNORMAL
HBV CORE IGM SERPL QL IA: ABNORMAL
HBV SURFACE AB SERPL IA-ACNC: 420 MIU/ML
HBV SURFACE AG SERPL QL IA: ABNORMAL
HCV AB SERPL QL IA: REACTIVE
HIV 1+2 AB+HIV1 P24 AG SERPL QL IA: NORMAL
HIV 2 AB SERPL QL IA: NORMAL
HIV1 AB SERPL QL IA: NORMAL
HIV1 P24 AG SERPL QL IA: NORMAL

## 2024-11-14 LAB
HAV AB SER QL IA: POSITIVE
HCV RNA SERPL NAA+PROBE-ACNC: ABNORMAL IU/ML
HCV RNA SERPL NAA+PROBE-LOG IU: 6.08 LOG IU/ML
HCV RNA SERPL QL NAA+PROBE: DETECTED

## 2024-11-15 DIAGNOSIS — B18.2 CHRONIC HEPATITIS C WITHOUT HEPATIC COMA (HCC): Primary | ICD-10-CM

## 2024-11-15 DIAGNOSIS — K74.60 HEPATIC CIRRHOSIS, UNSPECIFIED HEPATIC CIRRHOSIS TYPE, UNSPECIFIED WHETHER ASCITES PRESENT (HCC): ICD-10-CM

## 2024-11-15 LAB — MISCELLANEOUS LAB TEST ORDER: ABNORMAL

## 2024-11-18 LAB — HCV GENTYP SERPL NAA+PROBE: NORMAL

## 2024-11-30 ENCOUNTER — HOSPITAL ENCOUNTER (EMERGENCY)
Facility: HOSPITAL | Age: 54
Discharge: HOME | End: 2024-11-30
Attending: HOSPITALIST
Payer: MEDICAID

## 2024-11-30 VITALS
TEMPERATURE: 97.8 F | SYSTOLIC BLOOD PRESSURE: 157 MMHG | HEIGHT: 69 IN | DIASTOLIC BLOOD PRESSURE: 94 MMHG | WEIGHT: 250 LBS | RESPIRATION RATE: 20 BRPM | HEART RATE: 101 BPM | OXYGEN SATURATION: 95 % | BODY MASS INDEX: 37.03 KG/M2

## 2024-11-30 DIAGNOSIS — K04.7 DENTAL ABSCESS: Primary | ICD-10-CM

## 2024-11-30 DIAGNOSIS — K08.89 PAIN, DENTAL: ICD-10-CM

## 2024-11-30 DIAGNOSIS — K02.9 DENTAL CARIES: ICD-10-CM

## 2024-11-30 PROCEDURE — 99283 EMERGENCY DEPT VISIT LOW MDM: CPT

## 2024-11-30 PROCEDURE — 6370000000 HC RX 637 (ALT 250 FOR IP): Performed by: HOSPITALIST

## 2024-11-30 RX ORDER — AMOXICILLIN 875 MG/1
875 TABLET, COATED ORAL 2 TIMES DAILY
Qty: 20 TABLET | Refills: 0 | Status: SHIPPED | OUTPATIENT
Start: 2024-11-30 | End: 2024-12-10

## 2024-11-30 RX ORDER — LIDOCAINE HYDROCHLORIDE 20 MG/ML
15 SOLUTION OROPHARYNGEAL ONCE
Status: COMPLETED | OUTPATIENT
Start: 2024-11-30 | End: 2024-11-30

## 2024-11-30 RX ADMIN — LIDOCAINE HYDROCHLORIDE 15 ML: 20 SOLUTION ORAL at 06:37

## 2024-11-30 RX ADMIN — BENZOCAINE, BUTAMBEN, AND TETRACAINE HYDROCHLORIDE 1 SPRAY: .028; .004; .004 AEROSOL, SPRAY TOPICAL at 06:38

## 2024-11-30 ASSESSMENT — PAIN - FUNCTIONAL ASSESSMENT
PAIN_FUNCTIONAL_ASSESSMENT: ACTIVITIES ARE NOT PREVENTED
PAIN_FUNCTIONAL_ASSESSMENT: 0-10

## 2024-11-30 ASSESSMENT — PAIN SCALES - GENERAL
PAINLEVEL_OUTOF10: 10
PAINLEVEL_OUTOF10: 10

## 2024-11-30 ASSESSMENT — PAIN DESCRIPTION - LOCATION
LOCATION: TEETH
LOCATION: TEETH

## 2024-11-30 ASSESSMENT — PAIN DESCRIPTION - PAIN TYPE: TYPE: ACUTE PAIN

## 2024-11-30 NOTE — ED PROVIDER NOTES
Triage Vitals   BP Systolic BP Percentile Diastolic BP Percentile Temp Temp src Pulse Resp SpO2   -- -- -- -- -- -- -- --      Height Weight         -- --             My pulse oximetry interpretation is which is within the normal range    Physical Exam  General :Patient is awake, alert, oriented, in no acute distress, nontoxic appearing  HEENT: Pupils are equally round and reactive to light, EOMI, conjunctivae clear.  Oral mucosa is moist, no exudate. Uvula is midline, no erythema to the posterior pharynx.  No asymmetrical tissue noted.  Mouth: Patient has severe dental caries and periodontal disease to the mouth throughout.  He does have several teeth are missing however the teeth that are bothering him are causing most discomfort is teeth #1 and 2.  There is severe damage to both teeth there is erosion exposure of dentin to both.  However there is no gingival abscess noted needs immediate draining.  He has percussible tenderness to both these teeth which does not tolerate well.  Neck: Neck is supple, full range of motion, trachea midline, no induration  Dermatology: Skin is warm and dry  Psych: Mentation is grossly normal, cognition is grossly normal. Affect is appropriate.        DIAGNOSTIC RESULTS   LABS:    I have reviewed and interpreted all of the currently available lab results from this visit (ifapplicable):  No results found for this visit on 11/30/24.           EKG: None    RADIOLOGY:   Non-plain film images such as CT, Ultrasound and MRI are read by the radiologist. Plain radiographic images are visualized and preliminarily interpreted by the ED Provider with the below findings:    None    Interpretation per the Radiologist below, if available at the time of this note:    No orders to display     No results found.    No results found.    PROCEDURES   Unless otherwise noted below, none     Procedures    CRITICAL CARE TIME   None    EMERGENCY DEPARTMENT COURSE and DIFFERENTIAL DIAGNOSIS/MDM:   Vitals:         DISCONTINUED MEDICATIONS:  Discontinued Medications    No medications on file              (Please note that portions of this note were completed with a voice recognition program.  Efforts were made to edit the dictations but occasionally words are mis-transcribed.)    Dennis Yost DO (electronically signed)           Dennis Yost DO  11/30/24 0627

## 2024-12-06 ENCOUNTER — SPECIALTY PHARMACY (OUTPATIENT)
Dept: PHARMACY | Facility: HOSPITAL | Age: 54
End: 2024-12-06
Payer: MEDICAID

## 2024-12-06 VITALS
BODY MASS INDEX: 38.84 KG/M2 | HEART RATE: 101 BPM | DIASTOLIC BLOOD PRESSURE: 67 MMHG | OXYGEN SATURATION: 95 % | SYSTOLIC BLOOD PRESSURE: 148 MMHG | WEIGHT: 263 LBS

## 2024-12-06 DIAGNOSIS — Z53.20 COLON CANCER SCREENING DECLINED: ICD-10-CM

## 2024-12-06 DIAGNOSIS — K70.30 ALCOHOLIC CIRRHOSIS OF LIVER WITHOUT ASCITES: ICD-10-CM

## 2024-12-06 DIAGNOSIS — R74.8 ELEVATED LIVER ENZYMES: ICD-10-CM

## 2024-12-06 DIAGNOSIS — B18.2 CHRONIC HEPATITIS C WITHOUT HEPATIC COMA: Primary | ICD-10-CM

## 2024-12-06 PROCEDURE — G0463 HOSPITAL OUTPT CLINIC VISIT: HCPCS

## 2024-12-06 RX ORDER — LIRAGLUTIDE 6 MG/ML
1.8 INJECTION SUBCUTANEOUS DAILY
COMMUNITY

## 2024-12-06 RX ORDER — KETOTIFEN FUMARATE 0.35 MG/ML
1 SOLUTION/ DROPS OPHTHALMIC 2 TIMES DAILY
COMMUNITY
Start: 2024-11-15

## 2024-12-06 RX ORDER — IBUPROFEN 800 MG/1
800 TABLET, FILM COATED ORAL 3 TIMES DAILY
COMMUNITY
End: 2024-12-06

## 2024-12-06 RX ORDER — AMOXICILLIN 875 MG/1
875 TABLET, COATED ORAL 2 TIMES DAILY
COMMUNITY
Start: 2024-11-30 | End: 2024-12-10

## 2024-12-06 RX ORDER — CHLORAL HYDRATE 500 MG
1000 CAPSULE ORAL
COMMUNITY
Start: 2024-11-15

## 2024-12-06 RX ORDER — ATORVASTATIN CALCIUM 80 MG/1
80 TABLET, FILM COATED ORAL DAILY
COMMUNITY

## 2024-12-06 RX ORDER — INSULIN ASPART 100 [IU]/ML
5 INJECTION, SOLUTION INTRAVENOUS; SUBCUTANEOUS
COMMUNITY
Start: 2024-09-20

## 2024-12-06 RX ORDER — LISINOPRIL 5 MG/1
5 TABLET ORAL DAILY
COMMUNITY

## 2024-12-06 RX ORDER — LEVOTHYROXINE SODIUM 25 UG/1
25 TABLET ORAL DAILY
COMMUNITY

## 2024-12-06 RX ORDER — ERGOCALCIFEROL 1.25 MG/1
50000 CAPSULE, LIQUID FILLED ORAL
COMMUNITY

## 2024-12-06 RX ORDER — INSULIN DETEMIR 100 [IU]/ML
20 INJECTION, SOLUTION SUBCUTANEOUS NIGHTLY
COMMUNITY
Start: 2024-09-20

## 2024-12-06 NOTE — PATIENT INSTRUCTIONS
Do not take NSAIDs, stop ibuprofen  Can take tylenol up to 2,000 mg per day even with cirrhosis  Stop alcohol

## 2024-12-06 NOTE — LETTER
2024     Jose Blackwell MD  [address] [facility] [address]    Patient: Albin Ivory   YOB: 1970   Date of Visit: 2024       Dear Jose Blackwell MD,    Thank you for referring Albin Ivory to me for evaluation. Below is a copy of my consult note.    If you have questions, please do not hesitate to call me. I look forward to following Albin along with you.         Sincerely,        [facility]        CC: CAITLIN Mccain         New Patient Consult      Date: 2024   Patient Name: Albin Ivory  MRN: [id number]  : 1970     Primary Care Provider: Siobhan Lehman APRN  Referring Provider: Referring    Chief Complaint   Patient presents with   • Hepatitis C     Pt here for initial eval     History of Present Illness: Albin Iovry is a 54 y.o. male who is here today as a consultation with Gastroenterology for evaluation of Hepatitis C.    He found out about having Hepatitis C infection approx 5 years ago. He has not had prior treatment for hepatitis. Reports no known personal history of liver disease including other viral hepatitis. There is no known family history of liver disease or cirrhosis. He admits to previous IVDU and intranasal drug use. Has not used IV drugs in 8 years. He does have nonprofessional tattoos. He does currently drink alcohol, drinks 6 - 16oz  beers per day. Has drank daily for most of his life. He denies  current illicit drug us. He does use marijuana. He has had recent labs. He has had previous vaccinations for Hepatitis A and B. He does not drive. Lives with his friend currently. He does not have a phone.     He has not had previous colonoscopy. He does have any plans to have a colonoscopy. There is no known family history of colon cancer or colon polyps.    Subjective      Past Medical History:   Diagnosis Date   • Back injury    • Hypertension      Past Surgical History:   Procedure Laterality Date   • APPENDECTOMY     • BACK  SURGERY  2017     History reviewed. No pertinent family history.    Social History     Socioeconomic History   • Marital status: Legally    Tobacco Use   • Smoking status: Every Day     Current packs/day: 2.00     Types: Cigarettes   Vaping Use   • Vaping status: Never Used   Substance and Sexual Activity   • Alcohol use: Not Currently     Alcohol/week: 6.0 standard drinks of alcohol     Types: 6 Cans of beer per week     Comment: daily   • Drug use: Yes     Types: Marijuana, Heroin, Cocaine(coke)     Comment: sober since 2012   • Sexual activity: Defer     Current Outpatient Medications:   •  amoxicillin (AMOXIL) 875 MG tablet, Take 1 tablet by mouth 2 (Two) Times a Day., Disp: , Rfl:   •  atorvastatin (LIPITOR) 80 MG tablet, Take 1 tablet by mouth Daily., Disp: , Rfl:   •  busPIRone (BUSPAR) 5 MG tablet, Take 1 tablet by mouth 3 times a day., Disp: , Rfl:   •  FLUoxetine (PROzac) 20 MG capsule, Take 1 capsule by mouth Daily., Disp: , Rfl:   •  ibuprofen (ADVIL,MOTRIN) 800 MG tablet, Take 1 tablet by mouth 3 (Three) Times a Day., Disp: , Rfl:   •  insulin aspart (NovoLOG FlexPen) 100 UNIT/ML solution pen-injector sc pen, Inject 5 Units under the skin into the appropriate area as directed 3 (Three) Times a Day With Meals., Disp: , Rfl:   •  insulin detemir (Levemir FlexPen) 100 UNIT/ML injection, Inject 20 Units under the skin into the appropriate area as directed Every Night., Disp: , Rfl:   •  Ketotifen Fumarate (ZADITOR) 0.035 % solution, Administer 1 drop to both eyes 2 (Two) Times a Day., Disp: , Rfl:   •  levothyroxine (SYNTHROID, LEVOTHROID) 25 MCG tablet, Take 1 tablet by mouth Daily., Disp: , Rfl:   •  lisinopril (PRINIVIL,ZESTRIL) 5 MG tablet, Take 1 tablet by mouth Daily., Disp: , Rfl:   •  Omega-3 Fatty Acids (fish oil) 1000 MG capsule capsule, Take 1 capsule by mouth Daily With Breakfast., Disp: , Rfl:   •  Victoza 18 MG/3ML solution pen-injector injection, Inject 1.8 mg under the skin into the  appropriate area as directed Daily., Disp: , Rfl:   •  vitamin D (ERGOCALCIFEROL) 1.25 MG (40630 UT) capsule capsule, Take 1 capsule by mouth Every 7 (Seven) Days., Disp: , Rfl:   •  atorvastatin (LIPITOR) 40 MG tablet, Take 1 tablet by mouth Daily. (Patient not taking: Reported on 12/6/2024), Disp: 90 tablet, Rfl: 2  •  fenofibrate (TRICOR) 48 MG tablet, Take 1 tablet by mouth Daily for 30 days., Disp: 30 tablet, Rfl: 0  •  HYDROcodone-acetaminophen (NORCO) 5-325 MG per tablet, Take 1 tablet by mouth Every 6 (Six) Hours As Needed for Moderate Pain . (Patient not taking: Reported on 12/6/2024), Disp: 12 tablet, Rfl: 0  •  insulin aspart (NovoLOG FlexPen) 100 UNIT/ML solution pen-injector sc pen, Inject 5 Units under the skin into the appropriate area as directed 3 (Three) Times a Day With Meals for 30 days., Disp: 4.5 mL, Rfl: 0  •  insulin detemir (Levemir FlexPen) 100 UNIT/ML injection, Inject 15 Units under the skin into the appropriate area as directed Daily for 30 days., Disp: 5 mL, Rfl: 0  •  metFORMIN (Glucophage) 500 MG tablet, Take 1 tablet by mouth Daily With Breakfast for 7 days, THEN 1 tablet 2 (Two) Times a Day With Meals for 7 days, THEN 1 tablet 3 (Three) Times a Day for 7 days, THEN 2 tablets 2 (Two) Times a Day With Meals for 7 days., Disp: 70 tablet, Rfl: 0    No Known Allergies    The following portions of the patient's history were reviewed and updated as appropriate: allergies, current medications, past family history, past medical history, past social history, past surgical history and problem list.    Objective     Physical Exam  Constitutional:       General: He is not in acute distress.     Appearance: Normal appearance. He is well-developed. He is not diaphoretic.   HENT:      Head: Normocephalic and atraumatic.      Right Ear: External ear normal.      Left Ear: External ear normal.      Nose: Nose normal.   Eyes:      General: No scleral icterus.        Right eye: No discharge.          Left eye: No discharge.      Conjunctiva/sclera: Conjunctivae normal.   Neck:      Trachea: No tracheal deviation.   Pulmonary:      Effort: Pulmonary effort is normal. No respiratory distress.   Musculoskeletal:         General: Normal range of motion.      Cervical back: Normal range of motion.   Skin:     Coloration: Skin is not pale.      Findings: No erythema or rash.   Neurological:      Mental Status: He is alert and oriented to person, place, and time.      Coordination: Coordination normal.   Psychiatric:         Mood and Affect: Mood normal.         Behavior: Behavior normal.         Thought Content: Thought content normal.         Judgment: Judgment normal.       Vitals:    12/06/24 1038   BP: 148/67   Pulse: 101   SpO2: 95%   Weight: 119 kg (263 lb)     Results Review:   I have reviewed the patient's new clinical and imaging results.    • Glucose 06/01/2024 144 (H)  65 - 99 mg/dL Final   • BUN 06/01/2024 10  6 - 20 mg/dL Final   • Creatinine 06/01/2024 0.77  0.76 - 1.27 mg/dL Final   • Sodium 06/01/2024 137  136 - 145 mmol/L Final   • Potassium 06/01/2024 3.6  3.5 - 5.2 mmol/L Final   • Chloride 06/01/2024 97 (L)  98 - 107 mmol/L Final   • CO2 06/01/2024 22.0  22.0 - 29.0 mmol/L Final   • Calcium 06/01/2024 9.3  8.6 - 10.5 mg/dL Final   • Total Protein 06/01/2024 7.9  6.0 - 8.5 g/dL Final   • Albumin 06/01/2024 4.1  3.5 - 5.2 g/dL Final   • ALT (SGPT) 06/01/2024 186 (H)  1 - 41 U/L Final   • AST (SGOT) 06/01/2024 122 (H)  1 - 40 U/L Final   • Alkaline Phosphatase 06/01/2024 89  39 - 117 U/L Final   • Total Bilirubin 06/01/2024 1.4 (H)  0.0 - 1.2 mg/dL Final   • Globulin 06/01/2024 3.8  gm/dL Final   • A/G Ratio 06/01/2024 1.1  g/dL Final   • BUN/Creatinine Ratio 06/01/2024 13.0  7.0 - 25.0 Final   • Anion Gap 06/01/2024 18.0 (H)  5.0 - 15.0 mmol/L Final   • eGFR 06/01/2024 107.7  >60.0 mL/min/1.73 Final   • Lipase 06/01/2024 73 (H)  13 - 60 U/L Final   • HS Troponin T 06/01/2024 <6  <22 ng/L Final   •  Magnesium 06/01/2024 1.4 (L)  1.6 - 2.6 mg/dL Final   • THC, Screen, Urine 06/01/2024 Negative  Negative Final   • Phencyclidine (PCP), Urine 06/01/2024 Negative  Negative Final   • Cocaine Screen, Urine 06/01/2024 Negative  Negative Final   • Methamphetamine, Ur 06/01/2024 Negative  Negative Final   • Opiate Screen 06/01/2024 Positive (A)  Negative Final   • Amphetamine Screen, Urine 06/01/2024 Negative  Negative Final   • Benzodiazepine Screen, Urine 06/01/2024 Negative  Negative Final   • Tricyclic Antidepressants Screen 06/01/2024 Negative  Negative Final   • Methadone Screen, Urine 06/01/2024 Negative  Negative Final   • Barbiturates Screen, Urine 06/01/2024 Negative  Negative Final   • Oxycodone Screen, Urine 06/01/2024 Negative  Negative Final   • Buprenorphine, Screen, Urine 06/01/2024 Negative  Negative Final   • WBC 06/01/2024 10.23  3.40 - 10.80 10*3/mm3 Final   • RBC 06/01/2024 5.30  4.14 - 5.80 10*6/mm3 Final   • Hemoglobin 06/01/2024 17.2  13.0 - 17.7 g/dL Final   • Hematocrit 06/01/2024 46.7  37.5 - 51.0 % Final   • MCV 06/01/2024 88.1  79.0 - 97.0 fL Final   • MCH 06/01/2024 32.5  26.6 - 33.0 pg Final   • MCHC 06/01/2024 36.8 (H)  31.5 - 35.7 g/dL Final   • RDW 06/01/2024 12.6  12.3 - 15.4 % Final   • RDW-SD 06/01/2024 41.1  37.0 - 54.0 fl Final   • MPV 06/01/2024 11.4  6.0 - 12.0 fL Final   • Platelets 06/01/2024 158  140 - 450 10*3/mm3 Final      HEPATITIS C GENOTYPE (11/12/2024 08:03)  - Miscellaneous Test (11/12/2024 08:03)  HIV-1/O/2 ANTIGEN/ANTIBODY (11/12/2024 08:03)  PROTIME-INR (11/12/2024 08:03)  COMPREHENSIVE METABOLIC PANEL (11/12/2024 08:03)  CBC (NO DIFF) (11/12/2024 08:03)  HEPATITIS B SURFACE ANTIBODY (11/12/2024 08:03)  HEPATITIS A ANTIBODY, TOTAL (11/12/2024 08:03)  HEPATITIS PANEL, ACUTE (11/12/2024 08:03)  HEPATITIS C RNA, QUANTITATIVE, PCR (GRAPH) (11/12/2024 08:03)    CTAP 6/2024  FINDINGS:  Calcified granulomas in the liver and spleen.  Diffusely hypodense liver consistent with  hepatic steatosis. Mildly nodular liver contours suggestive of hepatic cirrhosis.  Normal kidneys.  Normal adrenal glands.  Normal pancreas.  No visible gallstones. No biliary dilation.  No evidence of bowel obstruction. Thickening of the walls of small bowel loops in the left abdomen. No pneumatosis.  The appendix is not identified, however, no secondary signs of acute appendicitis.  Mild diffuse bladder wall thickening.  No ascites. No pneumoperitoneum.  No lymphadenopathy.  No acute fracture. Degenerative and postsurgical changes in the spine.  No abdominal aortic aneurysm.  IMPRESSION:  Small bowel wall thickening, which could be due to enteritis or underdistention.  Possible cystitis.  Nonemergent/incidental findings above.    Assessment / Plan      1. Chronic hepatitis C without hepatic coma, MELD 8 (11/2024), CTP 5 class A  2. Elevated liver enzymes  3. Alcoholic cirrhosis of liver without ascites  He has chronic Hepatitis C infection, genotype 1a/1b, treatment naive, with compensated cirrhosis. I have prescribed Mavyret for 8 weeks for Hepatitis C therapy. He will take this medication at the same time every day without missing any doses. We had a discussion on treatment course, possible medication adverse reactions and follow-up during the treatment and after treatment. Hep C viral load lab (HCV RNA quant) and CMP will be checked 4 weeks after start of therapy, at end of therapy and 3 months s/p therapy to determine response to treatment and cure. He will continue to abstain from alcohol use at this time and agrees not to use illegal drugs. He understands to avoid any high risk behavior to prevent any reinfection during treatment and after treatment.    CTP class A. Has compensated cirrhosis, Genotype 1a/1b.  He is immune to hepatitis A and B, no vaccinations needed. Source of infection is his previous illicit drug use. Liver imaging last 6/2024 with CTAP, showed fatty liver and cirrhosis, no focal liver  lesions. HIV test is negative. No history of liver transplant.     Start Mavyret x 8 weeks  He will need long term management at GI clinic for cirrhosis  Follow-up in 4 weeks time for discussion regarding medication adherence  Will have labs after next visit    Rx- Glecaprevir-Pibrentasvir (Mavyret) 100-40 MG tablet, Take 3 tablets by mouth Daily for 8 weeks. Disp: 84 tablet, Rfl: 1    4. Colon cancer screening declined  Never had a colonoscopy. No family history of colon cancer. Declines all colon cancer screening tests. He understands that we may miss pathology in the colon without a colonoscopy. Other alternatives include cologuard which has been offered and declined.    Follow Up:   Return in about 6 weeks (around 1/17/2025) for recheck Hepatitis C.    Jody Anderson PA-C  [facility] [place]  12/12/2024  17:00 EST    Dictated Utilizing Dragon Dictation: Part of this note may be an electronic transcription/translation of spoken language to printed text using the Dragon Dictation System.

## 2024-12-06 NOTE — PROGRESS NOTES
New Patient Consult      Date: 2024   Patient Name: Albin Ivory  MRN: [id number]  : 1970     Primary Care Provider: Siobhan Lehman APRN  Referring Provider: Referring    Chief Complaint   Patient presents with    Hepatitis C     Pt here for initial eval     History of Present Illness: Albin Ivory is a 54 y.o. male who is here today as a consultation with Gastroenterology for evaluation of Hepatitis C.    He found out about having Hepatitis C infection approx 5 years ago. He has not had prior treatment for hepatitis. Reports no known personal history of liver disease including other viral hepatitis. There is no known family history of liver disease or cirrhosis. He admits to previous IVDU and intranasal drug use. Has not used IV drugs in 8 years. He does have nonprofessional tattoos. He does currently drink alcohol, drinks 6 - 16oz  beers per day. Has drank daily for most of his life. He denies  current illicit drug us. He does use marijuana. He has had recent labs. He has had previous vaccinations for Hepatitis A and B. He does not drive. Lives with his friend currently. He does not have a phone.     He has not had previous colonoscopy. He does have any plans to have a colonoscopy. There is no known family history of colon cancer or colon polyps.    Subjective      Past Medical History:   Diagnosis Date    Back injury     Hypertension      Past Surgical History:   Procedure Laterality Date    APPENDECTOMY      BACK SURGERY  2017     History reviewed. No pertinent family history.    Social History     Socioeconomic History    Marital status: Legally    Tobacco Use    Smoking status: Every Day     Current packs/day: 2.00     Types: Cigarettes   Vaping Use    Vaping status: Never Used   Substance and Sexual Activity    Alcohol use: Not Currently     Alcohol/week: 6.0 standard drinks of alcohol     Types: 6 Cans of beer per week     Comment: daily    Drug use: Yes     Types: Marijuana,  Heroin, Cocaine(coke)     Comment: sober since 2012    Sexual activity: Defer     Current Outpatient Medications:     amoxicillin (AMOXIL) 875 MG tablet, Take 1 tablet by mouth 2 (Two) Times a Day., Disp: , Rfl:     atorvastatin (LIPITOR) 80 MG tablet, Take 1 tablet by mouth Daily., Disp: , Rfl:     busPIRone (BUSPAR) 5 MG tablet, Take 1 tablet by mouth 3 times a day., Disp: , Rfl:     FLUoxetine (PROzac) 20 MG capsule, Take 1 capsule by mouth Daily., Disp: , Rfl:     ibuprofen (ADVIL,MOTRIN) 800 MG tablet, Take 1 tablet by mouth 3 (Three) Times a Day., Disp: , Rfl:     insulin aspart (NovoLOG FlexPen) 100 UNIT/ML solution pen-injector sc pen, Inject 5 Units under the skin into the appropriate area as directed 3 (Three) Times a Day With Meals., Disp: , Rfl:     insulin detemir (Levemir FlexPen) 100 UNIT/ML injection, Inject 20 Units under the skin into the appropriate area as directed Every Night., Disp: , Rfl:     Ketotifen Fumarate (ZADITOR) 0.035 % solution, Administer 1 drop to both eyes 2 (Two) Times a Day., Disp: , Rfl:     levothyroxine (SYNTHROID, LEVOTHROID) 25 MCG tablet, Take 1 tablet by mouth Daily., Disp: , Rfl:     lisinopril (PRINIVIL,ZESTRIL) 5 MG tablet, Take 1 tablet by mouth Daily., Disp: , Rfl:     Omega-3 Fatty Acids (fish oil) 1000 MG capsule capsule, Take 1 capsule by mouth Daily With Breakfast., Disp: , Rfl:     Victoza 18 MG/3ML solution pen-injector injection, Inject 1.8 mg under the skin into the appropriate area as directed Daily., Disp: , Rfl:     vitamin D (ERGOCALCIFEROL) 1.25 MG (78059 UT) capsule capsule, Take 1 capsule by mouth Every 7 (Seven) Days., Disp: , Rfl:     atorvastatin (LIPITOR) 40 MG tablet, Take 1 tablet by mouth Daily. (Patient not taking: Reported on 12/6/2024), Disp: 90 tablet, Rfl: 2    fenofibrate (TRICOR) 48 MG tablet, Take 1 tablet by mouth Daily for 30 days., Disp: 30 tablet, Rfl: 0    HYDROcodone-acetaminophen (NORCO) 5-325 MG per tablet, Take 1 tablet by mouth  Every 6 (Six) Hours As Needed for Moderate Pain . (Patient not taking: Reported on 12/6/2024), Disp: 12 tablet, Rfl: 0    insulin aspart (NovoLOG FlexPen) 100 UNIT/ML solution pen-injector sc pen, Inject 5 Units under the skin into the appropriate area as directed 3 (Three) Times a Day With Meals for 30 days., Disp: 4.5 mL, Rfl: 0    insulin detemir (Levemir FlexPen) 100 UNIT/ML injection, Inject 15 Units under the skin into the appropriate area as directed Daily for 30 days., Disp: 5 mL, Rfl: 0    metFORMIN (Glucophage) 500 MG tablet, Take 1 tablet by mouth Daily With Breakfast for 7 days, THEN 1 tablet 2 (Two) Times a Day With Meals for 7 days, THEN 1 tablet 3 (Three) Times a Day for 7 days, THEN 2 tablets 2 (Two) Times a Day With Meals for 7 days., Disp: 70 tablet, Rfl: 0    No Known Allergies    The following portions of the patient's history were reviewed and updated as appropriate: allergies, current medications, past family history, past medical history, past social history, past surgical history and problem list.    Objective     Physical Exam  Constitutional:       General: He is not in acute distress.     Appearance: Normal appearance. He is well-developed. He is not diaphoretic.   HENT:      Head: Normocephalic and atraumatic.      Right Ear: External ear normal.      Left Ear: External ear normal.      Nose: Nose normal.   Eyes:      General: No scleral icterus.        Right eye: No discharge.         Left eye: No discharge.      Conjunctiva/sclera: Conjunctivae normal.   Neck:      Trachea: No tracheal deviation.   Pulmonary:      Effort: Pulmonary effort is normal. No respiratory distress.   Musculoskeletal:         General: Normal range of motion.      Cervical back: Normal range of motion.   Skin:     Coloration: Skin is not pale.      Findings: No erythema or rash.   Neurological:      Mental Status: He is alert and oriented to person, place, and time.      Coordination: Coordination normal.    Psychiatric:         Mood and Affect: Mood normal.         Behavior: Behavior normal.         Thought Content: Thought content normal.         Judgment: Judgment normal.       Vitals:    12/06/24 1038   BP: 148/67   Pulse: 101   SpO2: 95%   Weight: 119 kg (263 lb)     Results Review:   I have reviewed the patient's new clinical and imaging results.     Glucose 06/01/2024 144 (H)  65 - 99 mg/dL Final    BUN 06/01/2024 10  6 - 20 mg/dL Final    Creatinine 06/01/2024 0.77  0.76 - 1.27 mg/dL Final    Sodium 06/01/2024 137  136 - 145 mmol/L Final    Potassium 06/01/2024 3.6  3.5 - 5.2 mmol/L Final    Chloride 06/01/2024 97 (L)  98 - 107 mmol/L Final    CO2 06/01/2024 22.0  22.0 - 29.0 mmol/L Final    Calcium 06/01/2024 9.3  8.6 - 10.5 mg/dL Final    Total Protein 06/01/2024 7.9  6.0 - 8.5 g/dL Final    Albumin 06/01/2024 4.1  3.5 - 5.2 g/dL Final    ALT (SGPT) 06/01/2024 186 (H)  1 - 41 U/L Final    AST (SGOT) 06/01/2024 122 (H)  1 - 40 U/L Final    Alkaline Phosphatase 06/01/2024 89  39 - 117 U/L Final    Total Bilirubin 06/01/2024 1.4 (H)  0.0 - 1.2 mg/dL Final    Globulin 06/01/2024 3.8  gm/dL Final    A/G Ratio 06/01/2024 1.1  g/dL Final    BUN/Creatinine Ratio 06/01/2024 13.0  7.0 - 25.0 Final    Anion Gap 06/01/2024 18.0 (H)  5.0 - 15.0 mmol/L Final    eGFR 06/01/2024 107.7  >60.0 mL/min/1.73 Final    Lipase 06/01/2024 73 (H)  13 - 60 U/L Final    HS Troponin T 06/01/2024 <6  <22 ng/L Final    Magnesium 06/01/2024 1.4 (L)  1.6 - 2.6 mg/dL Final    THC, Screen, Urine 06/01/2024 Negative  Negative Final    Phencyclidine (PCP), Urine 06/01/2024 Negative  Negative Final    Cocaine Screen, Urine 06/01/2024 Negative  Negative Final    Methamphetamine, Ur 06/01/2024 Negative  Negative Final    Opiate Screen 06/01/2024 Positive (A)  Negative Final    Amphetamine Screen, Urine 06/01/2024 Negative  Negative Final    Benzodiazepine Screen, Urine 06/01/2024 Negative  Negative Final    Tricyclic Antidepressants Screen  06/01/2024 Negative  Negative Final    Methadone Screen, Urine 06/01/2024 Negative  Negative Final    Barbiturates Screen, Urine 06/01/2024 Negative  Negative Final    Oxycodone Screen, Urine 06/01/2024 Negative  Negative Final    Buprenorphine, Screen, Urine 06/01/2024 Negative  Negative Final    WBC 06/01/2024 10.23  3.40 - 10.80 10*3/mm3 Final    RBC 06/01/2024 5.30  4.14 - 5.80 10*6/mm3 Final    Hemoglobin 06/01/2024 17.2  13.0 - 17.7 g/dL Final    Hematocrit 06/01/2024 46.7  37.5 - 51.0 % Final    MCV 06/01/2024 88.1  79.0 - 97.0 fL Final    MCH 06/01/2024 32.5  26.6 - 33.0 pg Final    MCHC 06/01/2024 36.8 (H)  31.5 - 35.7 g/dL Final    RDW 06/01/2024 12.6  12.3 - 15.4 % Final    RDW-SD 06/01/2024 41.1  37.0 - 54.0 fl Final    MPV 06/01/2024 11.4  6.0 - 12.0 fL Final    Platelets 06/01/2024 158  140 - 450 10*3/mm3 Final      HEPATITIS C GENOTYPE (11/12/2024 08:03)  - Miscellaneous Test (11/12/2024 08:03)  HIV-1/O/2 ANTIGEN/ANTIBODY (11/12/2024 08:03)  PROTIME-INR (11/12/2024 08:03)  COMPREHENSIVE METABOLIC PANEL (11/12/2024 08:03)  CBC (NO DIFF) (11/12/2024 08:03)  HEPATITIS B SURFACE ANTIBODY (11/12/2024 08:03)  HEPATITIS A ANTIBODY, TOTAL (11/12/2024 08:03)  HEPATITIS PANEL, ACUTE (11/12/2024 08:03)  HEPATITIS C RNA, QUANTITATIVE, PCR (GRAPH) (11/12/2024 08:03)    CTAP 6/2024  FINDINGS:  Calcified granulomas in the liver and spleen.  Diffusely hypodense liver consistent with hepatic steatosis. Mildly nodular liver contours suggestive of hepatic cirrhosis.  Normal kidneys.  Normal adrenal glands.  Normal pancreas.  No visible gallstones. No biliary dilation.  No evidence of bowel obstruction. Thickening of the walls of small bowel loops in the left abdomen. No pneumatosis.  The appendix is not identified, however, no secondary signs of acute appendicitis.  Mild diffuse bladder wall thickening.  No ascites. No pneumoperitoneum.  No lymphadenopathy.  No acute fracture. Degenerative and postsurgical changes in  the spine.  No abdominal aortic aneurysm.  IMPRESSION:  Small bowel wall thickening, which could be due to enteritis or underdistention.  Possible cystitis.  Nonemergent/incidental findings above.    Assessment / Plan      1. Chronic hepatitis C without hepatic coma, MELD 8 (11/2024), CTP 5 class A  2. Elevated liver enzymes  3. Alcoholic cirrhosis of liver without ascites  He has chronic Hepatitis C infection, genotype 1a/1b, treatment naive, with compensated cirrhosis. I have prescribed Mavyret for 8 weeks for Hepatitis C therapy. He will take this medication at the same time every day without missing any doses. We had a discussion on treatment course, possible medication adverse reactions and follow-up during the treatment and after treatment. Hep C viral load lab (HCV RNA quant) and CMP will be checked 4 weeks after start of therapy, at end of therapy and 3 months s/p therapy to determine response to treatment and cure. He will continue to abstain from alcohol use at this time and agrees not to use illegal drugs. He understands to avoid any high risk behavior to prevent any reinfection during treatment and after treatment.    CTP class A. Has compensated cirrhosis, Genotype 1a/1b.  He is immune to hepatitis A and B, no vaccinations needed. Source of infection is his previous illicit drug use. Liver imaging last 6/2024 with CTAP, showed fatty liver and cirrhosis, no focal liver lesions. HIV test is negative. No history of liver transplant.     Start Mavyret x 8 weeks  He will need long term management at GI clinic for cirrhosis  Follow-up in 4 weeks time for discussion regarding medication adherence  Will have labs after next visit    Rx- Glecaprevir-Pibrentasvir (Mavyret) 100-40 MG tablet, Take 3 tablets by mouth Daily for 8 weeks. Disp: 84 tablet, Rfl: 1    4. Colon cancer screening declined  Never had a colonoscopy. No family history of colon cancer. Declines all colon cancer screening tests. He understands  that we may miss pathology in the colon without a colonoscopy. Other alternatives include cologuard which has been offered and declined.    Follow Up:   Return in about 6 weeks (around 1/17/2025) for recheck Hepatitis C.    Jody Anderson PA-C  [facility]  12/12/2024  17:00 EST    Dictated Utilizing Dragon Dictation: Part of this note may be an electronic transcription/translation of spoken language to printed text using the Dragon Dictation System.

## 2024-12-09 ENCOUNTER — APPOINTMENT (OUTPATIENT)
Dept: CT IMAGING | Facility: HOSPITAL | Age: 54
End: 2024-12-09
Payer: MEDICAID

## 2024-12-09 ENCOUNTER — APPOINTMENT (OUTPATIENT)
Dept: GENERAL RADIOLOGY | Facility: HOSPITAL | Age: 54
End: 2024-12-09
Payer: MEDICAID

## 2024-12-09 ENCOUNTER — HOSPITAL ENCOUNTER (EMERGENCY)
Facility: HOSPITAL | Age: 54
Discharge: HOME | End: 2024-12-09
Attending: HOSPITALIST
Payer: MEDICAID

## 2024-12-09 VITALS
HEART RATE: 87 BPM | RESPIRATION RATE: 16 BRPM | BODY MASS INDEX: 37.03 KG/M2 | HEIGHT: 69 IN | OXYGEN SATURATION: 98 % | DIASTOLIC BLOOD PRESSURE: 97 MMHG | WEIGHT: 250 LBS | TEMPERATURE: 98.3 F | SYSTOLIC BLOOD PRESSURE: 156 MMHG

## 2024-12-09 DIAGNOSIS — R10.84 GENERALIZED ABDOMINAL PAIN: ICD-10-CM

## 2024-12-09 DIAGNOSIS — E87.3 METABOLIC ACIDOSIS WITH RESPIRATORY ALKALOSIS: ICD-10-CM

## 2024-12-09 DIAGNOSIS — E87.20 METABOLIC ACIDOSIS WITH RESPIRATORY ALKALOSIS: ICD-10-CM

## 2024-12-09 DIAGNOSIS — J20.9 ACUTE BRONCHITIS, UNSPECIFIED ORGANISM: Primary | ICD-10-CM

## 2024-12-09 DIAGNOSIS — E83.42 HYPOMAGNESEMIA: ICD-10-CM

## 2024-12-09 DIAGNOSIS — R79.89 ELEVATED LACTIC ACID LEVEL: ICD-10-CM

## 2024-12-09 LAB
ACETONE SERPL QL SCN: NEGATIVE
ALBUMIN SERPL-MCNC: 3.9 G/DL (ref 3.4–4.8)
ALBUMIN/GLOB SERPL: 1 {RATIO} (ref 0.8–2)
ALP SERPL-CCNC: 103 U/L (ref 25–100)
ALT SERPL-CCNC: 202 U/L (ref 4–36)
AMPHET UR QL SCN: ABNORMAL
ANION GAP SERPL CALCULATED.3IONS-SCNC: 23 MMOL/L (ref 3–16)
AST SERPL-CCNC: 156 U/L (ref 8–33)
BACTERIA URNS QL MICRO: ABNORMAL /HPF
BARBITURATES UR QL SCN: ABNORMAL
BASE EXCESS BLDV CALC-SCNC: -1.2 MMOL/L (ref -3–3)
BASOPHILS # BLD: 0.1 K/UL (ref 0–0.1)
BASOPHILS NFR BLD: 1.4 %
BENZODIAZ UR QL SCN: ABNORMAL
BILIRUB SERPL-MCNC: 1.5 MG/DL (ref 0.3–1.2)
BILIRUB UR QL STRIP.AUTO: NEGATIVE
BUN SERPL-MCNC: 6 MG/DL (ref 6–20)
BUPRENORPHINE QUAL, URINE: ABNORMAL
CALCIUM SERPL-MCNC: 8.8 MG/DL (ref 8.5–10.5)
CANNABINOIDS UR QL SCN: POSITIVE
CHLORIDE SERPL-SCNC: 97 MMOL/L (ref 98–107)
CLARITY UR: CLEAR
CO2 BLDV-SCNC: 22 MMOL/L
CO2 SERPL-SCNC: 12 MMOL/L (ref 20–30)
COCAINE UR QL SCN: ABNORMAL
COLOR UR: YELLOW
CREAT SERPL-MCNC: 0.8 MG/DL (ref 0.4–1.2)
DRUG SCREEN COMMENT UR-IMP: ABNORMAL
EOSINOPHIL # BLD: 0.2 K/UL (ref 0–0.4)
EOSINOPHIL NFR BLD: 4.7 %
EPI CELLS #/AREA URNS HPF: ABNORMAL /HPF (ref 0–5)
ERYTHROCYTE [DISTWIDTH] IN BLOOD BY AUTOMATED COUNT: 12.7 % (ref 11–16)
FENTANYL SCREEN, URINE: NORMAL
FLUAV AG NPH QL: NEGATIVE
FLUBV AG NPH QL: NEGATIVE
GFR SERPLBLD CREATININE-BSD FMLA CKD-EPI: >90 ML/MIN/{1.73_M2}
GLOBULIN SER CALC-MCNC: 4.1 G/DL
GLUCOSE SERPL-MCNC: 214 MG/DL (ref 74–106)
GLUCOSE UR STRIP.AUTO-MCNC: NEGATIVE MG/DL
HCO3 BLDV-SCNC: 21.2 MMOL/L (ref 23–29)
HCT VFR BLD AUTO: 46.4 % (ref 40–54)
HGB BLD-MCNC: 16.7 G/DL (ref 13–18)
HGB UR QL STRIP.AUTO: NEGATIVE
IMM GRANULOCYTES # BLD: 0 K/UL
IMM GRANULOCYTES NFR BLD: 0.2 % (ref 0–5)
INHALED O2 FLOW RATE: 0.2 %
KETONES UR STRIP.AUTO-MCNC: NEGATIVE MG/DL
LACTATE BLDV-SCNC: 4.1 MMOL/L (ref 0.4–1.9)
LEUKOCYTE ESTERASE UR QL STRIP.AUTO: NEGATIVE
LIPASE SERPL-CCNC: 51 U/L (ref 5.6–51.3)
LYMPHOCYTES # BLD: 1.5 K/UL (ref 1.5–4)
LYMPHOCYTES NFR BLD: 30.3 %
MAGNESIUM SERPL-MCNC: 1.5 MG/DL (ref 1.7–2.4)
MCH RBC QN AUTO: 32.8 PG (ref 27–32)
MCHC RBC AUTO-ENTMCNC: 36 G/DL (ref 31–35)
MCV RBC AUTO: 91.2 FL (ref 80–100)
METHADONE UR QL SCN: ABNORMAL
METHAMPHET UR QL SCN: ABNORMAL
MONOCYTES # BLD: 0.4 K/UL (ref 0.2–0.8)
MONOCYTES NFR BLD: 7.9 %
NEUTROPHILS # BLD: 2.7 K/UL (ref 2–7.5)
NEUTS SEG NFR BLD: 55.5 %
NITRITE UR QL STRIP.AUTO: NEGATIVE
NT-PROBNP SERPL-MCNC: 190 PG/ML (ref 0–1800)
O2 THERAPY: ABNORMAL
OPIATES UR QL SCN: POSITIVE
OXYCODONE UR QL SCN: ABNORMAL
PCO2 BLDV: 30 MMHG (ref 40–50)
PCP UR QL SCN: ABNORMAL
PH BLDV: 7.47 [PH] (ref 7.35–7.45)
PH UR STRIP.AUTO: 6.5 [PH] (ref 5–8)
PLATELET # BLD AUTO: 129 K/UL (ref 150–400)
PMV BLD AUTO: 10.1 FL (ref 6–10)
PO2 BLDV: 84.1 MMHG (ref 25–40)
POTASSIUM SERPL-SCNC: 3.3 MMOL/L (ref 3.4–5.1)
PROT SERPL-MCNC: 8 G/DL (ref 6.4–8.3)
PROT UR STRIP.AUTO-MCNC: 100 MG/DL
RBC # BLD AUTO: 5.09 M/UL (ref 4.5–6)
RBC #/AREA URNS HPF: ABNORMAL /HPF (ref 0–4)
S PYO AG THROAT QL: NEGATIVE
SAO2 % BLDV: 97 %
SARS-COV-2 RDRP RESP QL NAA+PROBE: NOT DETECTED
SODIUM SERPL-SCNC: 132 MMOL/L (ref 136–145)
SP GR UR STRIP.AUTO: 1.01 (ref 1–1.03)
TRICYCLICS UR QL SCN: ABNORMAL
TROPONIN, HIGH SENSITIVITY: 9 NG/L (ref 0–22)
UA COMPLETE W REFLEX CULTURE PNL UR: ABNORMAL
UA DIPSTICK W REFLEX MICRO PNL UR: YES
URN SPEC COLLECT METH UR: ABNORMAL
UROBILINOGEN UR STRIP-ACNC: 1 E.U./DL
WBC # BLD AUTO: 4.9 K/UL (ref 4–11)
WBC #/AREA URNS HPF: ABNORMAL /HPF (ref 0–5)

## 2024-12-09 PROCEDURE — 87804 INFLUENZA ASSAY W/OPTIC: CPT

## 2024-12-09 PROCEDURE — 74177 CT ABD & PELVIS W/CONTRAST: CPT

## 2024-12-09 PROCEDURE — 87635 SARS-COV-2 COVID-19 AMP PRB: CPT

## 2024-12-09 PROCEDURE — 80053 COMPREHEN METABOLIC PANEL: CPT

## 2024-12-09 PROCEDURE — 96375 TX/PRO/DX INJ NEW DRUG ADDON: CPT

## 2024-12-09 PROCEDURE — 71045 X-RAY EXAM CHEST 1 VIEW: CPT

## 2024-12-09 PROCEDURE — 81001 URINALYSIS AUTO W/SCOPE: CPT

## 2024-12-09 PROCEDURE — 85025 COMPLETE CBC W/AUTO DIFF WBC: CPT

## 2024-12-09 PROCEDURE — 80307 DRUG TEST PRSMV CHEM ANLYZR: CPT

## 2024-12-09 PROCEDURE — 96361 HYDRATE IV INFUSION ADD-ON: CPT

## 2024-12-09 PROCEDURE — 83605 ASSAY OF LACTIC ACID: CPT

## 2024-12-09 PROCEDURE — 82009 KETONE BODYS QUAL: CPT

## 2024-12-09 PROCEDURE — 6360000004 HC RX CONTRAST MEDICATION: Performed by: HOSPITALIST

## 2024-12-09 PROCEDURE — 84484 ASSAY OF TROPONIN QUANT: CPT

## 2024-12-09 PROCEDURE — 83880 ASSAY OF NATRIURETIC PEPTIDE: CPT

## 2024-12-09 PROCEDURE — 87040 BLOOD CULTURE FOR BACTERIA: CPT

## 2024-12-09 PROCEDURE — 83690 ASSAY OF LIPASE: CPT

## 2024-12-09 PROCEDURE — 6360000002 HC RX W HCPCS: Performed by: HOSPITALIST

## 2024-12-09 PROCEDURE — 83735 ASSAY OF MAGNESIUM: CPT

## 2024-12-09 PROCEDURE — 99285 EMERGENCY DEPT VISIT HI MDM: CPT

## 2024-12-09 PROCEDURE — G0480 DRUG TEST DEF 1-7 CLASSES: HCPCS

## 2024-12-09 PROCEDURE — 87880 STREP A ASSAY W/OPTIC: CPT

## 2024-12-09 PROCEDURE — 36415 COLL VENOUS BLD VENIPUNCTURE: CPT

## 2024-12-09 PROCEDURE — 82803 BLOOD GASES ANY COMBINATION: CPT

## 2024-12-09 PROCEDURE — 96365 THER/PROPH/DIAG IV INF INIT: CPT

## 2024-12-09 PROCEDURE — 2580000003 HC RX 258: Performed by: HOSPITALIST

## 2024-12-09 RX ORDER — MAGNESIUM SULFATE 1 G/100ML
1000 INJECTION INTRAVENOUS ONCE
Status: DISCONTINUED | OUTPATIENT
Start: 2024-12-09 | End: 2024-12-09 | Stop reason: HOSPADM

## 2024-12-09 RX ORDER — MAGNESIUM OXIDE 400 MG/1
400 TABLET ORAL 2 TIMES DAILY
Qty: 14 TABLET | Refills: 0 | Status: SHIPPED | OUTPATIENT
Start: 2024-12-09 | End: 2024-12-16

## 2024-12-09 RX ORDER — 0.9 % SODIUM CHLORIDE 0.9 %
30 INTRAVENOUS SOLUTION INTRAVENOUS ONCE
Status: COMPLETED | OUTPATIENT
Start: 2024-12-09 | End: 2024-12-09

## 2024-12-09 RX ORDER — PREDNISONE 20 MG/1
20 TABLET ORAL DAILY
Qty: 5 TABLET | Refills: 0 | Status: SHIPPED | OUTPATIENT
Start: 2024-12-09 | End: 2024-12-14

## 2024-12-09 RX ORDER — GUAIFENESIN 600 MG/1
1200 TABLET, EXTENDED RELEASE ORAL 2 TIMES DAILY
Qty: 28 TABLET | Refills: 0 | Status: SHIPPED | OUTPATIENT
Start: 2024-12-09 | End: 2024-12-16

## 2024-12-09 RX ORDER — MORPHINE SULFATE 4 MG/ML
4 INJECTION, SOLUTION INTRAMUSCULAR; INTRAVENOUS EVERY 30 MIN PRN
Status: DISCONTINUED | OUTPATIENT
Start: 2024-12-09 | End: 2024-12-09 | Stop reason: HOSPADM

## 2024-12-09 RX ORDER — DEXAMETHASONE SODIUM PHOSPHATE 10 MG/ML
10 INJECTION INTRAMUSCULAR; INTRAVENOUS ONCE
Status: DISCONTINUED | OUTPATIENT
Start: 2024-12-09 | End: 2024-12-09 | Stop reason: HOSPADM

## 2024-12-09 RX ORDER — IOPAMIDOL 755 MG/ML
100 INJECTION, SOLUTION INTRAVASCULAR
Status: COMPLETED | OUTPATIENT
Start: 2024-12-09 | End: 2024-12-09

## 2024-12-09 RX ORDER — ALBUTEROL SULFATE 90 UG/1
2 INHALANT RESPIRATORY (INHALATION) 4 TIMES DAILY PRN
Qty: 18 G | Refills: 0 | Status: SHIPPED | OUTPATIENT
Start: 2024-12-09

## 2024-12-09 RX ORDER — ONDANSETRON 2 MG/ML
4 INJECTION INTRAMUSCULAR; INTRAVENOUS ONCE
Status: COMPLETED | OUTPATIENT
Start: 2024-12-09 | End: 2024-12-09

## 2024-12-09 RX ADMIN — IOPAMIDOL 100 ML: 755 INJECTION, SOLUTION INTRAVENOUS at 17:34

## 2024-12-09 RX ADMIN — MORPHINE SULFATE 4 MG: 4 INJECTION, SOLUTION INTRAMUSCULAR; INTRAVENOUS at 16:38

## 2024-12-09 RX ADMIN — PIPERACILLIN AND TAZOBACTAM 4500 MG: 4; .5 INJECTION, POWDER, FOR SOLUTION INTRAVENOUS at 17:43

## 2024-12-09 RX ADMIN — ONDANSETRON 4 MG: 2 INJECTION INTRAMUSCULAR; INTRAVENOUS at 16:36

## 2024-12-09 RX ADMIN — SODIUM CHLORIDE 2121 ML: 9 INJECTION, SOLUTION INTRAVENOUS at 16:39

## 2024-12-09 ASSESSMENT — LIFESTYLE VARIABLES
HOW MANY STANDARD DRINKS CONTAINING ALCOHOL DO YOU HAVE ON A TYPICAL DAY: PATIENT DOES NOT DRINK
HOW OFTEN DO YOU HAVE A DRINK CONTAINING ALCOHOL: NEVER

## 2024-12-09 ASSESSMENT — PAIN SCALES - GENERAL
PAINLEVEL_OUTOF10: 4
PAINLEVEL_OUTOF10: 9

## 2024-12-09 ASSESSMENT — PAIN DESCRIPTION - DESCRIPTORS
DESCRIPTORS: STABBING
DESCRIPTORS: STABBING

## 2024-12-09 ASSESSMENT — PAIN DESCRIPTION - LOCATION
LOCATION: ABDOMEN
LOCATION: ABDOMEN

## 2024-12-09 NOTE — ED TRIAGE NOTES
Patient presents today with shortness or breath, abdominal pain, diarrhea, and cough for 3 days. He was recently diagnosed with hepatitis C and liver cirrhosis. He quit drinking alcohol 4 days ago. He was drinking a 6 pack of beer daily prior. He denies dysuria. He has not been taking any OTC medications. The patient reports that he has had some midsternum chest pain. He denies cardiac history. He is alert and oriented x 4. He is diaphoretic. Abdomen is tender to touch.

## 2024-12-09 NOTE — ED PROVIDER NOTES
[facility]  EMERGENCY DEPARTMENT ENCOUNTER        Pt Name: Titi Valencia  MRN: [id number]  Birthdate 1970  Date of evaluation: 12/9/2024  Provider: Dennis Yost DO  PCP: Haily Mock APRN - CNP  Note Started: 4:15 PM EST 12/9/24    CHIEF COMPLAINT       Chief Complaint   Patient presents with    Abdominal Pain    Shortness of Breath    Cough       HISTORY OF PRESENT ILLNESS: 1 or more Elements     History from : Patient    Limitations to history : None    Titi Valencia is a 54 y.o. male who presents to the emergency department for abdominal pain, shortness of breath and cough.  Patient states his symptoms started about 3 days ago.  He states his feeling of shortness of breath with cough which is productive.  Patient states he feels like he is coughed so much she is pulled or tore something in his abdominal wall.  Patient states it is tender to touch.  Patient describes his abdominal pain as sharp and stabbing.  He rates it as a 9-1/2 out of 10.  Patient states he is had some mild chest discomfort in the anterior chest wall he points that specially with cough deep breath.  Positive for nausea and diarrhea but he has not really had any vomiting.  Positive abdominal pain.  Denies any dysuria.  Denies any body aches.  Positive fevers chills.  Denies sick contacts that is where.  Patient states that he quit drinking alcohol 4 days ago.  However he states he is not having any withdrawal symptoms no shaking.  He states he probably did have a beer within the last couple of days also but states he is done this before and never had withdrawal with stop drinking.  Patient has history of hepatitis C and cirrhosis of the liver which he states he just recently found out about.  Patient vies his he had had a mild headache when symptoms for started.  Denies any earache.  Positive for nasal congestion.  Denies any loss of taste or smell.  Positive for cough.  Again positive shortness of  CM noted discharge order. Patient is discharging home with home health provided by [facility], [facility] 9/29/2022. Patient will be receiving his enteral feedings from [facility], first delivery will be made tonight. Patient is clear to discharge home with home health by CM. Nurse is aware. Discharge plan of care/case management plan validated with provider discharge order.

## 2024-12-09 NOTE — ED NOTES
IV removed without complication. Patient declines finishing medications. Dr. Yost discussed with patient, and is in agreement. Patient is alert and oriented x 4, no further questions or concerns. Patient called his ride, and will waiting in lobby. Patient discharged home via POV.

## 2024-12-12 ENCOUNTER — TELEPHONE (OUTPATIENT)
Dept: GASTROENTEROLOGY | Facility: CLINIC | Age: 54
End: 2024-12-12
Payer: MEDICAID

## 2024-12-12 DIAGNOSIS — B18.2 CHRONIC HEPATITIS C WITHOUT HEPATIC COMA: Primary | ICD-10-CM

## 2024-12-12 RX ORDER — GLECAPREVIR AND PIBRENTASVIR 40; 100 MG/1; MG/1
3 TABLET, FILM COATED ORAL DAILY
Start: 2024-12-12 | End: 2024-12-19 | Stop reason: SDUPTHER

## 2024-12-12 NOTE — TELEPHONE ENCOUNTER
He has chronic Hepatitis C infection, genotype 1a/1b, treatment naive, with compensated cirrhosis. I have prescribed Mavyret 3 tabs PO once daily x 8 weeks for Hepatitis C therapy.     He is immune to hepatitis A and B, no vaccinations needed.     MELD 8 (11/2024)  CTP 5 class A

## 2024-12-14 LAB
BACTERIA BLD CULT ORG #2: NORMAL
BACTERIA BLD CULT: NORMAL

## 2024-12-16 ENCOUNTER — SPECIALTY PHARMACY (OUTPATIENT)
Dept: PHARMACY | Facility: HOSPITAL | Age: 54
End: 2024-12-16
Payer: MEDICAID

## 2024-12-16 PROBLEM — B18.2 CHRONIC HEPATITIS C: Status: ACTIVE | Noted: 2024-12-16

## 2024-12-16 NOTE — TELEPHONE ENCOUNTER
Significant interaction with atorvastatin, PCP prescriber. No recent significant cardiac events or stroke, do note hypertriglyceridemia. Contacted PCP office (CAITLIN Mccain) and left message about potentially holding statin x 8 weeks vs. switching to rosuvastatin with max dose of 10 mg daily if continuation is warranted. Waiting on response from office.    ADDENDUM:   Per CAITLIN Mccain, will discontinue atorvastatin and start rosuvastatin 10 mg to reduce risk of ADRs. PCP office will call Rx in. Will inform patient during counseling session to STOP atorvastatin and START rosuvastatin.

## 2024-12-16 NOTE — TELEPHONE ENCOUNTER
[facility] approved for only 1 fill from 12/13/2024 to 1/12/2025. Entered reminder for clinic staff to resubmit PA in January for second fill to complete treatment.

## 2024-12-19 ENCOUNTER — SPECIALTY PHARMACY (OUTPATIENT)
Dept: PHARMACY | Facility: HOSPITAL | Age: 54
End: 2024-12-19
Payer: MEDICAID

## 2024-12-19 DIAGNOSIS — B18.2 CHRONIC HEPATITIS C WITHOUT HEPATIC COMA: ICD-10-CM

## 2024-12-19 RX ORDER — GLECAPREVIR AND PIBRENTASVIR 40; 100 MG/1; MG/1
3 TABLET, FILM COATED ORAL DAILY
Qty: 84 TABLET | Refills: 1 | Status: SHIPPED | OUTPATIENT
Start: 2024-12-19

## 2024-12-19 NOTE — PROGRESS NOTES
[facility]  Hepatitis C Initial Assessment     Albin Ivory is a 54 y.o. male diagnosed with Hepatitis C and enrolled in the [facility] for treatment. An initial outreach was conducted, including assessment of therapy appropriateness and specialty medication education for Mavyret.    Previous Hep C Treatment: Patient is treatment naïve    Relevant Past Medical History and Comorbidities  Past Medical History:   Diagnosis Date    Back injury     Hypertension      Social History     Socioeconomic History    Marital status: Legally    Tobacco Use    Smoking status: Every Day     Current packs/day: 2.00     Types: Cigarettes   Vaping Use    Vaping status: Never Used   Substance and Sexual Activity    Alcohol use: Not Currently     Alcohol/week: 6.0 standard drinks of alcohol     Types: 6 Cans of beer per week     Comment: daily    Drug use: Yes     Types: Marijuana, Heroin, Cocaine(coke)     Comment: sober since 2012    Sexual activity: Defer       Allergies  Patient has no known allergies.    Insurance Coverage & Financial Support: Prior authorization complete until March 2025    Current Medication List:    Current Outpatient Medications:     atorvastatin (LIPITOR) 80 MG tablet, Take 1 tablet by mouth Daily., Disp: , Rfl:     busPIRone (BUSPAR) 5 MG tablet, Take 1 tablet by mouth 3 times a day., Disp: , Rfl:     fenofibrate (TRICOR) 48 MG tablet, Take 1 tablet by mouth Daily for 30 days., Disp: 30 tablet, Rfl: 0    FLUoxetine (PROzac) 20 MG capsule, Take 1 capsule by mouth Daily., Disp: , Rfl:     Glecaprevir-Pibrentasvir (Mavyret) 100-40 MG tablet, Take 3 tablets by mouth Daily. Take all 3 tablets PO once daily with food, Disp: , Rfl:     insulin aspart (NovoLOG FlexPen) 100 UNIT/ML solution pen-injector sc pen, Inject 5 Units under the skin into the appropriate area as directed 3 (Three) Times a Day With Meals for 30 days., Disp: 4.5 mL, Rfl: 0    insulin aspart (NovoLOG FlexPen)  100 UNIT/ML solution pen-injector sc pen, Inject 5 Units under the skin into the appropriate area as directed 3 (Three) Times a Day With Meals., Disp: , Rfl:     insulin detemir (Levemir FlexPen) 100 UNIT/ML injection, Inject 15 Units under the skin into the appropriate area as directed Daily for 30 days., Disp: 5 mL, Rfl: 0    insulin detemir (Levemir FlexPen) 100 UNIT/ML injection, Inject 20 Units under the skin into the appropriate area as directed Every Night., Disp: , Rfl:     Ketotifen Fumarate (ZADITOR) 0.035 % solution, Administer 1 drop to both eyes 2 (Two) Times a Day., Disp: , Rfl:     levothyroxine (SYNTHROID, LEVOTHROID) 25 MCG tablet, Take 1 tablet by mouth Daily., Disp: , Rfl:     lisinopril (PRINIVIL,ZESTRIL) 5 MG tablet, Take 1 tablet by mouth Daily., Disp: , Rfl:     metFORMIN (Glucophage) 500 MG tablet, Take 1 tablet by mouth Daily With Breakfast for 7 days, THEN 1 tablet 2 (Two) Times a Day With Meals for 7 days, THEN 1 tablet 3 (Three) Times a Day for 7 days, THEN 2 tablets 2 (Two) Times a Day With Meals for 7 days., Disp: 70 tablet, Rfl: 0    Omega-3 Fatty Acids (fish oil) 1000 MG capsule capsule, Take 1 capsule by mouth Daily With Breakfast., Disp: , Rfl:     Victoza 18 MG/3ML solution pen-injector injection, Inject 1.8 mg under the skin into the appropriate area as directed Daily., Disp: , Rfl:     vitamin D (ERGOCALCIFEROL) 1.25 MG (53182 UT) capsule capsule, Take 1 capsule by mouth Every 7 (Seven) Days., Disp: , Rfl:     Drug Interactions:  Medication list was reviewed for drug-drug interactions with Hepatitis C treatment. None noted.    Relevant Laboratory Values:  Lab Results   Component Value Date    GLUCOSE 308 (H) 06/03/2024    CALCIUM 8.6 06/03/2024     (L) 06/03/2024    K 4.3 06/03/2024    CO2 21.9 (L) 06/03/2024     06/03/2024    BUN 13 06/03/2024    CREATININE 0.76 06/03/2024    EGFRIFAFRI >60 08/16/2021    EGFRIFNONA >60 08/16/2021    BCR 17.1 06/03/2024    ANIONGAP  "8.1 06/03/2024    AST 50 (H) 06/03/2024     (H) 06/03/2024     Lab Results   Component Value Date    WBC 6.6 11/12/2024    HGB 17.0 11/12/2024    HCT 48.4 11/12/2024    MCV 93.3 11/12/2024     (L) 11/12/2024       HCV RNA quant: 1,190,000  Hepatitis C Genotype   Date Value Ref Range Status   11/12/2024 1a or 1b  Final     Comment:     Cannot be further subtyped into Type 1a or Type 1b due to  high conservation of the 5' untranslated region of the HCV  genome. In addition, Type 6 virus may be misclassified as  Type 1 in some cases. The Hepatitis C Virus High-Resolution  Genotype by Sequencing test (Intell[employer] test code [id number])  provides a higher level of subtype resolution.  INTERPRETIVE INFORMATION:  Hepatitis C Genotyping  Hepatitis C viral RNA is tested using reverse transcription polymerase  chain  reaction (RT-PCR) to amplify a specific portion of the 5' untranslated  region  (5' UTR) of the viral genome. The amplified nucleic acid is sequenced  bidirectionally using dye-terminator chemistry ([employer]). Sequencing data  is  compared to a database of characterized sequences.  Isolates of hepatitis C virus are grouped into six major genotypes  (1-6).  These genotypes are subtyped according to sequence characteristics. Due  to  high conservation of the 5' untranslated region of the HCV genome, this  test  has limitations in differentiating subtype 1a from 1b. Therefore, these  subtypes will be reported as \"1a or 1b.\" In rare instances, type 6  virus may  be misclassified as type 1.  This test was developed and its performance characteristics determined  by  [facility]. It has not been cleared or approved by the U.[place]. Food  and  Drug Administration. This test was performed in a CLIA-certified  laboratory  and is intended for clinical purposes.  Performed By: [facility]  [address] [place] [address]  : Shmuel Juarez MD, PhD  CLIA Number: [id number]   08/12/2021 " Detected (A) Not Detected. Final       Fibrosis: F4    FIB-4: 3.2    Immunizations:  Hepatitis A: immune  Hepatitis B: immune  Lab Results   Component Value Date    HEPAIGM Non-Reactive 06/02/2024    HEPBCAB Positive (A) 08/12/2021         Co-infection Evaluation:  HIV -- Negative  Hepatitis B -- previous exposure, no active infection      Initial Education Provided for Mavyret  The patient has been provided with the following education for MAVYRET (glecaprevir and pibrentasvir). All questions and concerns have been addressed prior to the patient receiving the medication, and the patient has verbalized understanding of the education and any materials provided. Additional patient education shall be provided and documented upon request by the patient, provider or payer.      The following counseling points for Mavyret (glecaprevir and pibrentasvir) were addressed:  Goal of treatment:  This medication is used to treat hepatitis C infection with the goal of curing infection.  Directions for use:  Take 3 tablets by mouth once daily for a total of 8 weeks. Take tablets at the same time each day, with food.  Missed doses:  It is very important that you do not miss a dose of this medication. Use a pill planner, medication adherence jessica or other tool to help you remember to take your medication.  If you do miss a dose and it is within 18 hours from the usual dosage time, administer dose as soon as possible, then administer next dose at the usual dosage time. If more than 18 hours from the usual dosage time has passed, skip the missed dose and administer the next dose at the usual time.  Do not stop taking this medication without talking to your provider.  Adverse effects:  Frequently reported side effects of this medication include: headache, loss of energy, nausea and diarrhea.   Go to the ED or call 911 with signs of a significant reaction including wheezing; chest tightness; fever; itching; bad cough; blue skin color;  seizures; or swelling of face, lips, tongue or throat.   Hepatic decompensation and hepatic failure have been reported. This typically occurs within the first 4 weeks of treatment initiation. Talk to your doctor right away if you notice dark urine, fatigue, lack of appetite, nausea, abdominal pain, light-colored stools, vomiting or yellow skin.  Follow-Up:  Be sure to keep your follow up appointment with our clinic 4 weeks after starting this medication to ensure you are tolerating it well and that you are responding appropriately to therapy.   Make sure to tell your doctor and pharmacist about all medications you are taking, including herbal supplements and OTC products at each visit. Contact clinic if any new medications are started during treatment.  Storage:  Store this medication at room temperature, away from any extreme temperatures or moisture exposure.    Patient Specific Counseling Points:   If childbearing age: Use of contraception during treatment in females of childbearing potential is recommended. Consider postponing pregnancy until therapy is complete to reduce the risk of HCV transmission. This medication should not be used in pregnant females and it is not known if the medication is present in breast milk.   If diabetic: Patients with diabetes should closely monitor their blood sugar after starting this medication as it may lead to an improvement in glucose metabolism, potentially resulting in hypoglycemia. Monitor for signs and symptoms of hypoglycemia and follow the rule of 15 to treat hypoglycemia.      Adherence and Self-Administration  Barriers to Patient Adherence and/or Self-Administration: None  Methods for Supporting Patient Adherence and/or Self-Administration: None Required     Associated Vaccinations   Your chronic liver disease puts you at risk for serious complications if you get infected with hepatitis A virus. If you've never been vaccinated against hepatitis A, you need 2 doses of  this vaccine, usually spaced 6-19 months apart.     If you already have chronic hepatitis B infection, you won't need a hepatitis B vaccine.  However, if you do not have sufficient Hepatitis B antibodies (either not vaccinated or insufficient response to vaccination), you should get the Hepatitis B vaccination series.  The vaccine is given in 2 or 3 doses, depending on the brand.     A combination A & B vaccination is also available if both are needed.     Contraindications: Severe allergic reaction (eg, anaphylaxis) after a previous dose of any hepatitis A-containing or hepatitis B-containing vaccine or any component of the formulation, including yeast and neomycin.   Precautions: Consider deferring administration in patients with moderate or severe acute illness. Use with caution in patients with bleeding disorders or severely immunocompromised patients    Albin Ivory was counseled that the following immunizations are recommended: Flu    Goals of Therapy:  Patient Goals of Therapy: Adherence  Clinical Goals or Therapeutic Targets, If Applicable: SVR 12 weeks    Attestation:  I attest that the initiated specialty medication is appropriate for the patient based on my assessment.  If the prescribed therapy is at any point deemed not appropriate based on the current or future assessments, a consultation will be initiated with the patient's specialty care provider to determine the best course of action. The revised plan of therapy will be documented along with any additional patient education provided.     Assessment & Plan:  Patient has Hepatitis C, genotype 1a and 1b(F4)(calculated FIB-4 = 3.2) and is treatment naïve. Patient has been prescribed Mavyret 3 tablets daily with food x 8 weeks. Medication education and counseling provided as above.  Patient will obtain medication from [facility] retail pharmacy.  The following immunizations are needed: flu.   Patient will follow up in clinic 4 weeks after starting medication to  assess virologic response and medication tolerability. Patient needs ride.    Marisel Avelar, PharmD  12/19/2024  16:19 EST

## 2025-01-17 ENCOUNTER — SPECIALTY PHARMACY (OUTPATIENT)
Dept: PHARMACY | Facility: HOSPITAL | Age: 55
End: 2025-01-17
Payer: MEDICAID

## 2025-01-17 ENCOUNTER — LAB (OUTPATIENT)
Dept: LAB | Facility: HOSPITAL | Age: 55
End: 2025-01-17
Payer: MEDICAID

## 2025-01-17 VITALS
WEIGHT: 258 LBS | SYSTOLIC BLOOD PRESSURE: 196 MMHG | DIASTOLIC BLOOD PRESSURE: 123 MMHG | BODY MASS INDEX: 38.1 KG/M2 | OXYGEN SATURATION: 96 % | HEART RATE: 99 BPM

## 2025-01-17 DIAGNOSIS — B18.2 CHRONIC HEPATITIS C WITHOUT HEPATIC COMA: Primary | ICD-10-CM

## 2025-01-17 DIAGNOSIS — B18.2 CHRONIC HEPATITIS C WITHOUT HEPATIC COMA: ICD-10-CM

## 2025-01-17 DIAGNOSIS — R74.8 ELEVATED LIVER ENZYMES: ICD-10-CM

## 2025-01-17 DIAGNOSIS — K70.30 ALCOHOLIC CIRRHOSIS OF LIVER WITHOUT ASCITES: ICD-10-CM

## 2025-01-17 LAB
ALBUMIN SERPL-MCNC: 4.1 G/DL (ref 3.5–5.2)
ALBUMIN/GLOB SERPL: 1 G/DL
ALP SERPL-CCNC: 80 U/L (ref 39–117)
ALT SERPL W P-5'-P-CCNC: 24 U/L (ref 1–41)
ANION GAP SERPL CALCULATED.3IONS-SCNC: 10.4 MMOL/L (ref 5–15)
AST SERPL-CCNC: 24 U/L (ref 1–40)
BILIRUB SERPL-MCNC: 1.3 MG/DL (ref 0–1.2)
BUN SERPL-MCNC: 11 MG/DL (ref 6–20)
BUN/CREAT SERPL: 13.8 (ref 7–25)
CALCIUM SPEC-SCNC: 9.4 MG/DL (ref 8.6–10.5)
CHLORIDE SERPL-SCNC: 98 MMOL/L (ref 98–107)
CO2 SERPL-SCNC: 25.6 MMOL/L (ref 22–29)
CREAT SERPL-MCNC: 0.8 MG/DL (ref 0.76–1.27)
EGFRCR SERPLBLD CKD-EPI 2021: 105.8 ML/MIN/1.73
GLOBULIN UR ELPH-MCNC: 4 GM/DL
GLUCOSE SERPL-MCNC: 250 MG/DL (ref 65–99)
POTASSIUM SERPL-SCNC: 4.5 MMOL/L (ref 3.5–5.2)
PROT SERPL-MCNC: 8.1 G/DL (ref 6–8.5)
SODIUM SERPL-SCNC: 134 MMOL/L (ref 136–145)

## 2025-01-17 PROCEDURE — G0463 HOSPITAL OUTPT CLINIC VISIT: HCPCS

## 2025-01-17 PROCEDURE — 87522 HEPATITIS C REVRS TRNSCRPJ: CPT

## 2025-01-17 PROCEDURE — 36415 COLL VENOUS BLD VENIPUNCTURE: CPT

## 2025-01-17 PROCEDURE — 80053 COMPREHEN METABOLIC PANEL: CPT

## 2025-01-17 RX ORDER — ALCOHOL ANTISEPTIC PADS
PADS, MEDICATED (EA) TOPICAL
COMMUNITY
Start: 2024-09-20

## 2025-01-17 RX ORDER — ALBUTEROL SULFATE 90 UG/1
2 INHALANT RESPIRATORY (INHALATION)
COMMUNITY
Start: 2024-12-09

## 2025-01-17 NOTE — LETTER
2025     CAITLIN Mccain  [address]  [facility] [address]    Patient: Albin Ivory   YOB: 1970   Date of Visit: 2025       Dear CAITLIN Mccain    Albin Ivory was in my office today. Below is a copy of my note.    If you have questions, please do not hesitate to call me. I look forward to following Albin along with you.         Sincerely,        [facility]        CC: No Recipients         Follow Up Note     Date: 2025   Patient Name: Albin Ivory  MRN: [id number]  : 1970     Primary Care Provider: Siobhan Lehman APRN     Chief Complaint   Patient presents with   • Hepatitis C     Pt here for 4 week follow up on Mavyret     History of present illness:   2025  Albin Ivory is a 54 y.o. male who is here today for follow up regarding Hepatitis C.    He has been taking Mavyret 3 tabs PO once daily x 4 weeks as directed. Has noticed the medication has caused fatigue. He has not missed any doses. No current illcit drug use. He has stopped all other oral medications because he is worried about his liver. Reports that although he has HTN today, he feels at baseline. Wants next labs in [place] if possible, he lives near [facility].     Interval History:  2024  He found out about having Hepatitis C infection approx 5 years ago. He has not had prior treatment for hepatitis. Reports no known personal history of liver disease including other viral hepatitis. There is no known family history of liver disease or cirrhosis. He admits to previous IVDU and intranasal drug use. Has not used IV drugs in 8 years. He does have nonprofessional tattoos. He does currently drink alcohol, drinks 6 - 16oz  beers per day. Has drank daily for most of his life. He denies  current illicit drug us. He does use marijuana. He has had recent labs. He has had previous vaccinations for Hepatitis A and B. He does not drive. Lives with his friend currently. He does not  have a phone.      He has not had previous colonoscopy. He does have any plans to have a colonoscopy. There is no known family history of colon cancer or colon polyps.    Subjective     Past Medical History:   Diagnosis Date   • Back injury    • Hypertension      Past Surgical History:   Procedure Laterality Date   • APPENDECTOMY     • BACK SURGERY  2017     History reviewed. No pertinent family history.  Social History     Socioeconomic History   • Marital status: Legally    Tobacco Use   • Smoking status: Every Day     Current packs/day: 1.50     Types: Cigarettes   Vaping Use   • Vaping status: Never Used   Substance and Sexual Activity   • Alcohol use: Not Currently     Comment: beer every couple days   • Drug use: Yes     Types: Marijuana, Heroin, Cocaine(coke)     Comment: sober since 2012   • Sexual activity: Defer     Current Outpatient Medications:   •  albuterol sulfate  (90 Base) MCG/ACT inhaler, Inhale 2 puffs., Disp: , Rfl:   •  Alcohol Swabs (Global Alcohol Prep Ease) 70 % pads, , Disp: , Rfl:   •  Glecaprevir-Pibrentasvir (Mavyret) 100-40 MG tablet, Take 3 tablets by mouth Daily. Take all 3 tablets once daily with food, Disp: 84 tablet, Rfl: 1  •  insulin aspart (NovoLOG FlexPen) 100 UNIT/ML solution pen-injector sc pen, Inject 5 Units under the skin into the appropriate area as directed 3 (Three) Times a Day With Meals., Disp: , Rfl:   •  insulin detemir (Levemir FlexPen) 100 UNIT/ML injection, Inject 20 Units under the skin into the appropriate area as directed Every Night., Disp: , Rfl:   •  Omega-3 Fatty Acids (fish oil) 1000 MG capsule capsule, Take 1 capsule by mouth Daily With Breakfast., Disp: , Rfl:   •  Victoza 18 MG/3ML solution pen-injector injection, Inject 1.8 mg under the skin into the appropriate area as directed Daily., Disp: , Rfl:   •  insulin aspart (NovoLOG FlexPen) 100 UNIT/ML solution pen-injector sc pen, Inject 5 Units under the skin into the appropriate area as  directed 3 (Three) Times a Day With Meals for 30 days., Disp: 4.5 mL, Rfl: 0  •  insulin detemir (Levemir FlexPen) 100 UNIT/ML injection, Inject 15 Units under the skin into the appropriate area as directed Daily for 30 days., Disp: 5 mL, Rfl: 0  •  metFORMIN (Glucophage) 500 MG tablet, Take 1 tablet by mouth Daily With Breakfast for 7 days, THEN 1 tablet 2 (Two) Times a Day With Meals for 7 days, THEN 1 tablet 3 (Three) Times a Day for 7 days, THEN 2 tablets 2 (Two) Times a Day With Meals for 7 days., Disp: 70 tablet, Rfl: 0    No Known Allergies    The following portions of the patient's history were reviewed and updated as appropriate: allergies, current medications, past family history, past medical history, past social history, past surgical history and problem list.    Objective     Physical Exam  Constitutional:       General: He is not in acute distress.     Appearance: Normal appearance. He is well-developed. He is obese. He is not ill-appearing or diaphoretic.   HENT:      Head: Normocephalic and atraumatic.      Right Ear: External ear normal.      Left Ear: External ear normal.      Nose: Nose normal.   Eyes:      General: No scleral icterus.        Right eye: No discharge.         Left eye: No discharge.      Conjunctiva/sclera: Conjunctivae normal.   Neck:      Trachea: No tracheal deviation.   Pulmonary:      Effort: Pulmonary effort is normal. No respiratory distress.   Musculoskeletal:         General: Normal range of motion.      Cervical back: Normal range of motion.   Skin:     Coloration: Skin is not pale.      Findings: No erythema or rash.      Comments: tattoos   Neurological:      Mental Status: He is alert and oriented to person, place, and time.      Coordination: Coordination normal.   Psychiatric:         Behavior: Behavior normal.         Thought Content: Thought content normal.         Judgment: Judgment normal.      Comments: Mild anxious affect       Vitals:    01/17/25 1022 01/17/25  1032   BP: (!) 184/128 (!) 196/123   Pulse: 99    SpO2: 96%    Weight: 117 kg (258 lb)      Results Review:   I reviewed the patient's new clinical results.    • Glucose 06/01/2024 144 (H)  65 - 99 mg/dL Final   • BUN 06/01/2024 10  6 - 20 mg/dL Final   • Creatinine 06/01/2024 0.77  0.76 - 1.27 mg/dL Final   • Sodium 06/01/2024 137  136 - 145 mmol/L Final   • Potassium 06/01/2024 3.6  3.5 - 5.2 mmol/L Final   • Chloride 06/01/2024 97 (L)  98 - 107 mmol/L Final   • CO2 06/01/2024 22.0  22.0 - 29.0 mmol/L Final   • Calcium 06/01/2024 9.3  8.6 - 10.5 mg/dL Final   • Total Protein 06/01/2024 7.9  6.0 - 8.5 g/dL Final   • Albumin 06/01/2024 4.1  3.5 - 5.2 g/dL Final   • ALT (SGPT) 06/01/2024 186 (H)  1 - 41 U/L Final   • AST (SGOT) 06/01/2024 122 (H)  1 - 40 U/L Final   • Alkaline Phosphatase 06/01/2024 89  39 - 117 U/L Final   • Total Bilirubin 06/01/2024 1.4 (H)  0.0 - 1.2 mg/dL Final   • Globulin 06/01/2024 3.8  gm/dL Final   • A/G Ratio 06/01/2024 1.1  g/dL Final   • BUN/Creatinine Ratio 06/01/2024 13.0  7.0 - 25.0 Final   • Anion Gap 06/01/2024 18.0 (H)  5.0 - 15.0 mmol/L Final   • eGFR 06/01/2024 107.7  >60.0 mL/min/1.73 Final   • Lipase 06/01/2024 73 (H)  13 - 60 U/L Final   • HS Troponin T 06/01/2024 <6  <22 ng/L Final   • Magnesium 06/01/2024 1.4 (L)  1.6 - 2.6 mg/dL Final   • WBC 06/01/2024 10.23  3.40 - 10.80 10*3/mm3 Final   • RBC 06/01/2024 5.30  4.14 - 5.80 10*6/mm3 Final   • Hemoglobin 06/01/2024 17.2  13.0 - 17.7 g/dL Final   • Hematocrit 06/01/2024 46.7  37.5 - 51.0 % Final   • MCV 06/01/2024 88.1  79.0 - 97.0 fL Final   • MCH 06/01/2024 32.5  26.6 - 33.0 pg Final   • MCHC 06/01/2024 36.8 (H)  31.5 - 35.7 g/dL Final   • RDW 06/01/2024 12.6  12.3 - 15.4 % Final   • RDW-SD 06/01/2024 41.1  37.0 - 54.0 fl Final   • MPV 06/01/2024 11.4  6.0 - 12.0 fL Final   • Platelets 06/01/2024 158  140 - 450 10*3/mm3 Final      HEPATITIS C GENOTYPE (11/12/2024 08:03)  - Miscellaneous Test (11/12/2024 08:03)  HIV-1/O/2  ANTIGEN/ANTIBODY (11/12/2024 08:03)  PROTIME-INR (11/12/2024 08:03)  COMPREHENSIVE METABOLIC PANEL (11/12/2024 08:03)  CBC (NO DIFF) (11/12/2024 08:03)  HEPATITIS B SURFACE ANTIBODY (11/12/2024 08:03)  HEPATITIS A ANTIBODY, TOTAL (11/12/2024 08:03)  HEPATITIS PANEL, ACUTE (11/12/2024 08:03)  HEPATITIS C RNA, QUANTITATIVE, PCR (GRAPH) (11/12/2024 08:03)     CTAP 6/2024  FINDINGS:  Calcified granulomas in the liver and spleen.  Diffusely hypodense liver consistent with hepatic steatosis. Mildly nodular liver contours suggestive of hepatic cirrhosis.  Normal kidneys.  Normal adrenal glands.  Normal pancreas.  No visible gallstones. No biliary dilation.  No evidence of bowel obstruction. Thickening of the walls of small bowel loops in the left abdomen. No pneumatosis.  The appendix is not identified, however, no secondary signs of acute appendicitis.  Mild diffuse bladder wall thickening.  No ascites. No pneumoperitoneum.  No lymphadenopathy.  No acute fracture. Degenerative and postsurgical changes in the spine.  No abdominal aortic aneurysm.  IMPRESSION:  Small bowel wall thickening, which could be due to enteritis or underdistention.  Possible cystitis.  Nonemergent/incidental findings above.    CT Abdomen Pelvis With Contrast  Result Date: 12/9/2024  No evidence of acute abnormality.     Assessment / Plan      1. Chronic hepatitis C without hepatic coma, MELD 8 (11/2024), CTP 5 class A  2. Elevated liver enzymes  3. Alcoholic cirrhosis of liver without ascites  He has chronic Hepatitis C infection, genotype 1a/1b, treatment naive, with compensated cirrhosis. He has been taking Mavyret 3 tabs PO once daily for the past 4 weeks for Hepatitis C therapy. He will continue to take this medication at the same time every day without missing any doses for total duration of 8 weeks. Hep C viral load lab (HCV RNA quant) and CMP will be checked today which is approx 4 weeks after start of therapy. Will have more labs at end of  therapy and final labs 3 months s/p therapy to determine response to treatment and cure. He will continue to abstain from alcohol use at this time and agrees not to use illegal drugs. He understands to avoid any high risk behavior to prevent any reinfection during treatment and after treatment. Liver imaging last 12/2024 with CTAP, no focal liver lesion. Prior imaging showed fatty liver and cirrhosis.    He is immune to hepatitis A and B, no vaccinations needed  Continue Mavyret   Labs today  Tx completion and final labs will be done at external lab ([facility] at his request), will get CMP, INR, CBC and AFP on next labs  Avoid NSAIDs  Should resume his other previously ordered medication including meds for HTN  Low salt, mediterranean diet  Needs EGD for esophageal varices screening, he has declined endoscopy at this time  He will need long term management at GI clinic for cirrhosis     - Comprehensive Metabolic Panel; Future  - Hepatitis C RNA, Quantitative, PCR (graph); Future      Prior history  Colon cancer screening declined  Never had a colonoscopy. No family history of colon cancer. Declines all colon cancer screening tests. He understands that we may miss pathology in the colon without a colonoscopy. Other alternatives include cologuard which has been offered and declined.    Follow Up:   Return in about 4 months (around 5/17/2025) for recheck Hepatitis C.    Jody Anderson PA-C  [facility]  1/17/2025  15:55 EST    Dictated Utilizing Dragon Dictation: Part of this note may be an electronic transcription/translation of spoken language to printed text using the Dragon Dictation System.

## 2025-01-17 NOTE — PROGRESS NOTES
Follow Up Note     Date: 2025   Patient Name: Albin Ivory  MRN: [id number]  : 1970     Primary Care Provider: Siobhan Lehman APRN     Chief Complaint   Patient presents with    Hepatitis C     Pt here for 4 week follow up on Mavyret     History of present illness:   2025  Albin Ivory is a 54 y.o. male who is here today for follow up regarding Hepatitis C.    He has been taking Mavyret 3 tabs PO once daily x 4 weeks as directed. Has noticed the medication has caused fatigue. He has not missed any doses. No current illcit drug use. He has stopped all other oral medications because he is worried about his liver. Reports that although he has HTN today, he feels at baseline. Wants next labs in [place] if possible, he lives near [facility].     Interval History:  2024  He found out about having Hepatitis C infection approx 5 years ago. He has not had prior treatment for hepatitis. Reports no known personal history of liver disease including other viral hepatitis. There is no known family history of liver disease or cirrhosis. He admits to previous IVDU and intranasal drug use. Has not used IV drugs in 8 years. He does have nonprofessional tattoos. He does currently drink alcohol, drinks 6 - 16oz  beers per day. Has drank daily for most of his life. He denies  current illicit drug us. He does use marijuana. He has had recent labs. He has had previous vaccinations for Hepatitis A and B. He does not drive. Lives with his friend currently. He does not have a phone.      He has not had previous colonoscopy. He does have any plans to have a colonoscopy. There is no known family history of colon cancer or colon polyps.    Subjective     Past Medical History:   Diagnosis Date    Back injury     Hypertension      Past Surgical History:   Procedure Laterality Date    APPENDECTOMY      BACK SURGERY  2017     History reviewed. No pertinent family history.  Social History     Socioeconomic History     Marital status: Legally    Tobacco Use    Smoking status: Every Day     Current packs/day: 1.50     Types: Cigarettes   Vaping Use    Vaping status: Never Used   Substance and Sexual Activity    Alcohol use: Not Currently     Comment: beer every couple days    Drug use: Yes     Types: Marijuana, Heroin, Cocaine(coke)     Comment: sober since 2012    Sexual activity: Defer     Current Outpatient Medications:     albuterol sulfate  (90 Base) MCG/ACT inhaler, Inhale 2 puffs., Disp: , Rfl:     Alcohol Swabs (Global Alcohol Prep Ease) 70 % pads, , Disp: , Rfl:     Glecaprevir-Pibrentasvir (Mavyret) 100-40 MG tablet, Take 3 tablets by mouth Daily. Take all 3 tablets once daily with food, Disp: 84 tablet, Rfl: 1    insulin aspart (NovoLOG FlexPen) 100 UNIT/ML solution pen-injector sc pen, Inject 5 Units under the skin into the appropriate area as directed 3 (Three) Times a Day With Meals., Disp: , Rfl:     insulin detemir (Levemir FlexPen) 100 UNIT/ML injection, Inject 20 Units under the skin into the appropriate area as directed Every Night., Disp: , Rfl:     Omega-3 Fatty Acids (fish oil) 1000 MG capsule capsule, Take 1 capsule by mouth Daily With Breakfast., Disp: , Rfl:     Victoza 18 MG/3ML solution pen-injector injection, Inject 1.8 mg under the skin into the appropriate area as directed Daily., Disp: , Rfl:     insulin aspart (NovoLOG FlexPen) 100 UNIT/ML solution pen-injector sc pen, Inject 5 Units under the skin into the appropriate area as directed 3 (Three) Times a Day With Meals for 30 days., Disp: 4.5 mL, Rfl: 0    insulin detemir (Levemir FlexPen) 100 UNIT/ML injection, Inject 15 Units under the skin into the appropriate area as directed Daily for 30 days., Disp: 5 mL, Rfl: 0    metFORMIN (Glucophage) 500 MG tablet, Take 1 tablet by mouth Daily With Breakfast for 7 days, THEN 1 tablet 2 (Two) Times a Day With Meals for 7 days, THEN 1 tablet 3 (Three) Times a Day for 7 days, THEN 2 tablets 2  (Two) Times a Day With Meals for 7 days., Disp: 70 tablet, Rfl: 0    No Known Allergies    The following portions of the patient's history were reviewed and updated as appropriate: allergies, current medications, past family history, past medical history, past social history, past surgical history and problem list.    Objective     Physical Exam  Constitutional:       General: He is not in acute distress.     Appearance: Normal appearance. He is well-developed. He is obese. He is not ill-appearing or diaphoretic.   HENT:      Head: Normocephalic and atraumatic.      Right Ear: External ear normal.      Left Ear: External ear normal.      Nose: Nose normal.   Eyes:      General: No scleral icterus.        Right eye: No discharge.         Left eye: No discharge.      Conjunctiva/sclera: Conjunctivae normal.   Neck:      Trachea: No tracheal deviation.   Pulmonary:      Effort: Pulmonary effort is normal. No respiratory distress.   Musculoskeletal:         General: Normal range of motion.      Cervical back: Normal range of motion.   Skin:     Coloration: Skin is not pale.      Findings: No erythema or rash.      Comments: tattoos   Neurological:      Mental Status: He is alert and oriented to person, place, and time.      Coordination: Coordination normal.   Psychiatric:         Behavior: Behavior normal.         Thought Content: Thought content normal.         Judgment: Judgment normal.      Comments: Mild anxious affect       Vitals:    01/17/25 1022 01/17/25 1032   BP: (!) 184/128 (!) 196/123   Pulse: 99    SpO2: 96%    Weight: 117 kg (258 lb)      Results Review:   I reviewed the patient's new clinical results.     Glucose 06/01/2024 144 (H)  65 - 99 mg/dL Final    BUN 06/01/2024 10  6 - 20 mg/dL Final    Creatinine 06/01/2024 0.77  0.76 - 1.27 mg/dL Final    Sodium 06/01/2024 137  136 - 145 mmol/L Final    Potassium 06/01/2024 3.6  3.5 - 5.2 mmol/L Final    Chloride 06/01/2024 97 (L)  98 - 107 mmol/L Final    CO2  06/01/2024 22.0  22.0 - 29.0 mmol/L Final    Calcium 06/01/2024 9.3  8.6 - 10.5 mg/dL Final    Total Protein 06/01/2024 7.9  6.0 - 8.5 g/dL Final    Albumin 06/01/2024 4.1  3.5 - 5.2 g/dL Final    ALT (SGPT) 06/01/2024 186 (H)  1 - 41 U/L Final    AST (SGOT) 06/01/2024 122 (H)  1 - 40 U/L Final    Alkaline Phosphatase 06/01/2024 89  39 - 117 U/L Final    Total Bilirubin 06/01/2024 1.4 (H)  0.0 - 1.2 mg/dL Final    Globulin 06/01/2024 3.8  gm/dL Final    A/G Ratio 06/01/2024 1.1  g/dL Final    BUN/Creatinine Ratio 06/01/2024 13.0  7.0 - 25.0 Final    Anion Gap 06/01/2024 18.0 (H)  5.0 - 15.0 mmol/L Final    eGFR 06/01/2024 107.7  >60.0 mL/min/1.73 Final    Lipase 06/01/2024 73 (H)  13 - 60 U/L Final    HS Troponin T 06/01/2024 <6  <22 ng/L Final    Magnesium 06/01/2024 1.4 (L)  1.6 - 2.6 mg/dL Final    WBC 06/01/2024 10.23  3.40 - 10.80 10*3/mm3 Final    RBC 06/01/2024 5.30  4.14 - 5.80 10*6/mm3 Final    Hemoglobin 06/01/2024 17.2  13.0 - 17.7 g/dL Final    Hematocrit 06/01/2024 46.7  37.5 - 51.0 % Final    MCV 06/01/2024 88.1  79.0 - 97.0 fL Final    MCH 06/01/2024 32.5  26.6 - 33.0 pg Final    MCHC 06/01/2024 36.8 (H)  31.5 - 35.7 g/dL Final    RDW 06/01/2024 12.6  12.3 - 15.4 % Final    RDW-SD 06/01/2024 41.1  37.0 - 54.0 fl Final    MPV 06/01/2024 11.4  6.0 - 12.0 fL Final    Platelets 06/01/2024 158  140 - 450 10*3/mm3 Final      HEPATITIS C GENOTYPE (11/12/2024 08:03)  - Miscellaneous Test (11/12/2024 08:03)  HIV-1/O/2 ANTIGEN/ANTIBODY (11/12/2024 08:03)  PROTIME-INR (11/12/2024 08:03)  COMPREHENSIVE METABOLIC PANEL (11/12/2024 08:03)  CBC (NO DIFF) (11/12/2024 08:03)  HEPATITIS B SURFACE ANTIBODY (11/12/2024 08:03)  HEPATITIS A ANTIBODY, TOTAL (11/12/2024 08:03)  HEPATITIS PANEL, ACUTE (11/12/2024 08:03)  HEPATITIS C RNA, QUANTITATIVE, PCR (GRAPH) (11/12/2024 08:03)     CTAP 6/2024  FINDINGS:  Calcified granulomas in the liver and spleen.  Diffusely hypodense liver consistent with hepatic steatosis. Mildly  nodular liver contours suggestive of hepatic cirrhosis.  Normal kidneys.  Normal adrenal glands.  Normal pancreas.  No visible gallstones. No biliary dilation.  No evidence of bowel obstruction. Thickening of the walls of small bowel loops in the left abdomen. No pneumatosis.  The appendix is not identified, however, no secondary signs of acute appendicitis.  Mild diffuse bladder wall thickening.  No ascites. No pneumoperitoneum.  No lymphadenopathy.  No acute fracture. Degenerative and postsurgical changes in the spine.  No abdominal aortic aneurysm.  IMPRESSION:  Small bowel wall thickening, which could be due to enteritis or underdistention.  Possible cystitis.  Nonemergent/incidental findings above.    CT Abdomen Pelvis With Contrast  Result Date: 12/9/2024  No evidence of acute abnormality.     Assessment / Plan      1. Chronic hepatitis C without hepatic coma, MELD 8 (11/2024), CTP 5 class A  2. Elevated liver enzymes  3. Alcoholic cirrhosis of liver without ascites  He has chronic Hepatitis C infection, genotype 1a/1b, treatment naive, with compensated cirrhosis. He has been taking Mavyret 3 tabs PO once daily for the past 4 weeks for Hepatitis C therapy. He will continue to take this medication at the same time every day without missing any doses for total duration of 8 weeks. Hep C viral load lab (HCV RNA quant) and CMP will be checked today which is approx 4 weeks after start of therapy. Will have more labs at end of therapy and final labs 3 months s/p therapy to determine response to treatment and cure. He will continue to abstain from alcohol use at this time and agrees not to use illegal drugs. He understands to avoid any high risk behavior to prevent any reinfection during treatment and after treatment. Liver imaging last 12/2024 with CTAP, no focal liver lesion. Prior imaging showed fatty liver and cirrhosis.    He is immune to hepatitis A and B, no vaccinations needed  Continue Mavyret   Labs  today  Tx completion and final labs will be done at external lab ([facility] at his request), will get CMP, INR, CBC and AFP on next labs  Avoid NSAIDs  Should resume his other previously ordered medication including meds for HTN  Low salt, mediterranean diet  Needs EGD for esophageal varices screening, he has declined endoscopy at this time  He will need long term management at GI clinic for cirrhosis     - Comprehensive Metabolic Panel; Future  - Hepatitis C RNA, Quantitative, PCR (graph); Future      Prior history  Colon cancer screening declined  Never had a colonoscopy. No family history of colon cancer. Declines all colon cancer screening tests. He understands that we may miss pathology in the colon without a colonoscopy. Other alternatives include cologuard which has been offered and declined.    Follow Up:   Return in about 4 months (around 5/17/2025) for recheck Hepatitis C.    Jody Anderson PA-C  [facility] [place]  1/17/2025  15:55 EST    Dictated Utilizing Dragon Dictation: Part of this note may be an electronic transcription/translation of spoken language to printed text using the Dragon Dictation System.

## 2025-01-20 ENCOUNTER — SPECIALTY PHARMACY (OUTPATIENT)
Dept: PHARMACY | Facility: HOSPITAL | Age: 55
End: 2025-01-20
Payer: MEDICAID

## 2025-01-20 NOTE — PROGRESS NOTES
-- DO NOT REPLY / DO NOT REPLY ALL --  -- Message is from Engagement Center Operations ([facility]) --    ONLY TO BE USED WITHIN A REFILL MEDICATION ENCOUNTER    Med Refill  Is the patient currently having any symptoms?: No/Non-Emergent symptoms    Name of medication requested: See pended med    Is this the first request for the medication in the last 48 hours?: Yes    On active list, patient is requesting a Change to pharmacy    Is this an existing medication or a new medication that is at one pharmacy and needs to be sent to another pharmacy?:  Existing Medication    New Pharmacy Requested: [facility]    Full name of the provider who ordered the medication: Shani Espinoza NP     Clinic site name / Account # for provider:   [address]     Preferred Pharmacy: Pharmacy  [id number] - [place], [place] - [address]    Patient confirmed the above pharmacy as correct?  Yes        Alternative phone number:     Can a detailed message be left?: Yes         Specialty Pharmacy Refill Coordination Note     Albin is a 54 y.o. male contacted today regarding refills of  MAVYRET specialty medication(s).    Reviewed and verified with patient:  Allergies  Meds       Specialty medication(s) and dose(s) confirmed: yes    Refill Questions      Flowsheet Row Most Recent Value   Changes to allergies? No   Changes to medications? Yes   New conditions or infections since last clinic visit No   Unplanned office visit, urgent care, ED, or hospital admission in the last 4 weeks  No   How does patient/caregiver feel medication is working? Excellent   Financial problems or insurance changes  No   Since the previous refill, were any specialty medication doses or scheduled injections missed or delayed?  No   Does this patient require a clinical escalation to a pharmacist? No            Delivery Questions      Flowsheet Row Most Recent Value   Delivery method  at Pharmacy   Delivery address verified with patient/caregiver? No   Delivery address Other (Comment)  [picked up 1-17-25]   Medication(s) being filled and delivered Glecaprevir-Pibrentasvir (Mavyret)   Doses left of specialty medications 1   Copay verified? Yes   Copay amount $0   Copay form of payment No copayment ($0)   Ship Date n/a  [picke dup 1-17]   Delivery Date n/a  [picked up 1-17]   Signature Required Yes            PT PICKED UP MAVYRET WHILE HERE FOR FOR 4 WEEK F/UP IN CLINIC       Follow-up: 4 week(s)     Azalea Pandey MA  Specialty Pharmacy Technician

## 2025-01-21 ENCOUNTER — TELEPHONE (OUTPATIENT)
Dept: GASTROENTEROLOGY | Facility: CLINIC | Age: 55
End: 2025-01-21
Payer: MEDICAID

## 2025-01-21 LAB
HCV RNA SERPL NAA+PROBE-ACNC: <15 IU/ML
REF LAB TEST REF RANGE: NORMAL

## 2025-01-21 NOTE — TELEPHONE ENCOUNTER
Let pt know HCV RNA less than 15 now after Mavyret for 4 weeks. He should continue the medication as directed and have repeat labs at end of treatment. I have ordered those for him. He wants to have them in [place].

## 2025-01-21 NOTE — TELEPHONE ENCOUNTER
Patient called back. Notified him [facility] RNA is less than 15. He will continue taking Mavyret as directed and will repeat labs once finished. He said if transportation is provided he will complete labs at [facility]. Adding a reminder to [facility] DSM  pool to schedule and remind patient closer to time.    Pt verbalized understanding and did not have any questions.    SEBASTIÁN Tristan

## 2025-01-31 ENCOUNTER — SPECIALTY PHARMACY (OUTPATIENT)
Dept: PHARMACY | Facility: HOSPITAL | Age: 55
End: 2025-01-31
Payer: MEDICAID

## 2025-01-31 NOTE — PROGRESS NOTES
[facility]  Specialty Pharmacy Patient Management Program  Hepatitis C Reassessment     Albin Ivory was referred by their provider [facility] for Hepatitis C treatment program. A follow-up outreach was conducted, including assessment of continued therapy appropriateness, medication adherence, and side effect incidence and management for Mavyret.     Patient had a 4 wk  follow-up in clinic on 1/17 and reported taking Mavyret as directed. He stated that he has had minimal ADRs with therapy but has noticed it has made him more fatigued. No missed doses reported and patient picked up refill from pharmacy after appointment.     Changes to Insurance Coverage or Financial Support  None     Relevant Past Medical History and Comorbidities  Relevant medical history and concomitant health conditions were discussed with the patient. The patient's chart has been reviewed for relevant past medical history and comorbid health conditions and updated as necessary.   Past Medical History:   Diagnosis Date    Back injury     Hypertension      Social History     Socioeconomic History    Marital status: Legally    Tobacco Use    Smoking status: Every Day     Current packs/day: 1.50     Types: Cigarettes   Vaping Use    Vaping status: Never Used   Substance and Sexual Activity    Alcohol use: Not Currently     Comment: beer every couple days    Drug use: Yes     Types: Marijuana, Heroin, Cocaine(coke)     Comment: sober since 2012    Sexual activity: Defer              Hospitalizations and Urgent Care Since Last Assessment  ED Visits, Admissions, or Hospitalizations: None   Urgent Office Visits: None     Allergies  Known allergies and reactions were discussed with the patient. The patient's chart has been reviewed for allergy information and updated as necessary.   No Known Allergies        Relevant Laboratory Values  Relevant laboratory values were discussed with the patient. The following  specialty medication dose adjustment(s) are recommended:     HCV RNA Quantitative:     1/17/25: HCV RNA <15 IU/mL    Lab Results   Component Value Date    GLUCOSE 250 (H) 01/17/2025    CALCIUM 9.4 01/17/2025     (L) 01/17/2025    K 4.5 01/17/2025    CO2 25.6 01/17/2025    CL 98 01/17/2025    BUN 11 01/17/2025    CREATININE 0.80 01/17/2025    EGFRIFAFRI >60 08/16/2021    EGFRIFNONA >60 08/16/2021    BCR 13.8 01/17/2025    ANIONGAP 10.4 01/17/2025     Lab Results   Component Value Date    CHOL 107 06/02/2024    TRIG 151 (H) 06/02/2024    HDL 24 (L) 06/02/2024    LDL 57 06/02/2024       Current Medication List  This medication list has been reviewed with the patient and evaluated for any interactions or necessary modifications/recommendations, and updated to include all prescription medications, OTC medications, and supplements the patient is currently taking.  This list reflects what is contained in the patient's profile, which has also been marked as reviewed to communicate to other providers it is the most up to date version of the patient's current medication therapy.     Current Outpatient Medications:     albuterol sulfate  (90 Base) MCG/ACT inhaler, Inhale 2 puffs., Disp: , Rfl:     Alcohol Swabs (Global Alcohol Prep Ease) 70 % pads, , Disp: , Rfl:     atorvastatin (LIPITOR) 80 MG tablet, Take 1 tablet by mouth Daily. (Patient not taking: Reported on 1/17/2025), Disp: , Rfl:     busPIRone (BUSPAR) 5 MG tablet, Take 1 tablet by mouth 3 times a day. (Patient not taking: Reported on 1/17/2025), Disp: , Rfl:     fenofibrate (TRICOR) 48 MG tablet, Take 1 tablet by mouth Daily for 30 days., Disp: 30 tablet, Rfl: 0    FLUoxetine (PROzac) 20 MG capsule, Take 1 capsule by mouth Daily. (Patient not taking: Reported on 1/17/2025), Disp: , Rfl:     Glecaprevir-Pibrentasvir (Mavyret) 100-40 MG tablet, Take 3 tablets by mouth Daily. Take all 3 tablets once daily with food, Disp: 84 tablet, Rfl: 1    insulin  aspart (NovoLOG FlexPen) 100 UNIT/ML solution pen-injector sc pen, Inject 5 Units under the skin into the appropriate area as directed 3 (Three) Times a Day With Meals for 30 days., Disp: 4.5 mL, Rfl: 0    insulin aspart (NovoLOG FlexPen) 100 UNIT/ML solution pen-injector sc pen, Inject 5 Units under the skin into the appropriate area as directed 3 (Three) Times a Day With Meals., Disp: , Rfl:     insulin detemir (Levemir FlexPen) 100 UNIT/ML injection, Inject 15 Units under the skin into the appropriate area as directed Daily for 30 days., Disp: 5 mL, Rfl: 0    insulin detemir (Levemir FlexPen) 100 UNIT/ML injection, Inject 20 Units under the skin into the appropriate area as directed Every Night., Disp: , Rfl:     levothyroxine (SYNTHROID, LEVOTHROID) 25 MCG tablet, Take 1 tablet by mouth Daily. (Patient not taking: Reported on 1/31/2025), Disp: , Rfl:     lisinopril (PRINIVIL,ZESTRIL) 5 MG tablet, Take 1 tablet by mouth Daily. (Patient not taking: Reported on 1/17/2025), Disp: , Rfl:     metFORMIN (Glucophage) 500 MG tablet, Take 1 tablet by mouth Daily With Breakfast for 7 days, THEN 1 tablet 2 (Two) Times a Day With Meals for 7 days, THEN 1 tablet 3 (Three) Times a Day for 7 days, THEN 2 tablets 2 (Two) Times a Day With Meals for 7 days., Disp: 70 tablet, Rfl: 0    Omega-3 Fatty Acids (fish oil) 1000 MG capsule capsule, Take 1 capsule by mouth Daily With Breakfast., Disp: , Rfl:     Victoza 18 MG/3ML solution pen-injector injection, Inject 1.8 mg under the skin into the appropriate area as directed Daily., Disp: , Rfl:     vitamin D (ERGOCALCIFEROL) 1.25 MG (69982 UT) capsule capsule, Take 1 capsule by mouth Every 7 (Seven) Days. (Patient not taking: Reported on 1/17/2025), Disp: , Rfl:          Drug Interactions  Patient was previously on Atorvastatin but is not longer taking medication (last filled on 11/15 x 30 ds)    Adverse Drug Reactions  Medication tolerability: Tolerating with no to minimal  ADRs  Medication plan: Continue therapy with normal follow-up    Plan for ADR Management: N/A    Adherence, Self-Administration, and Current Therapy Problems  Adherence related to the patient's specialty therapy was discussed with the patient. The Adherence segment of this outreach has been reviewed and updated.     Adherence Questions  Linked Medication(s) Assessed: Glecaprevir-Pibrentasvir (Mavyret)  On average, how many doses/injections does the patient miss per month?: 0  What are the identified reasons for non-adherence or missed doses? : no problems identified  What is the estimated medication adherence level?: %  Based on the patient/caregiver response and refill history, does this patient require an MTP to track adherence improvements?: no    Additional Barriers to Patient Self-Administration: N/A  Methods for Supporting Patient Self-Administration: N/A    Open Medication Therapy Problems  No medication therapy recommendations to display    Goals of Therapy  Goals related to the patient's specialty therapy were discussed with the patient. The Patient Goals segment of this outreach has been reviewed and updated.   Goals Addressed Today        Specialty Pharmacy General Goal      HEPCRNA not detected at 12 weeks post medication treatment completed              Quality of Life Assessment   Quality of Life related to the patient's enrollment in the patient management program and services provided was discussed with the patient. The QOL segment of this outreach has been reviewed and updated.  Quality of Life Improvement Scale: 10-Significantly better    Reassessment Plan & Follow-Up  1. Medication Therapy Changes: None   2. Related Plans, Therapy Recommendations, or Issues to Be Addressed: Patient will complete EOT labs and SVR 12 labs to ensure Hep C is cured.   3. Pharmacist to repeat assessment in 4-weeks after end of treatment labs performed.  4. Care Coordinator to set up future refill outreaches,  coordinate prescription delivery, and escalate clinical questions to pharmacist.    Attestation  Therapeutic appropriateness: Appropriate   I attest the patient was actively involved in and has agreed to the above plan of care.  If the prescribed therapy is at any point deemed not appropriate based on the current or future assessments, a consultation will be initiated with the patient's specialty care provider to determine the best course of action. The revised plan of therapy will be documented along with any required assessments and/or additional patient education provided.     Eshter Montgomery RPH  1/31/2025  13:03 EST

## 2025-02-07 ENCOUNTER — APPOINTMENT (OUTPATIENT)
Dept: CT IMAGING | Facility: HOSPITAL | Age: 55
End: 2025-02-07
Payer: MEDICAID

## 2025-02-07 ENCOUNTER — APPOINTMENT (OUTPATIENT)
Dept: GENERAL RADIOLOGY | Facility: HOSPITAL | Age: 55
End: 2025-02-07
Payer: MEDICAID

## 2025-02-07 ENCOUNTER — HOSPITAL ENCOUNTER (EMERGENCY)
Facility: HOSPITAL | Age: 55
Discharge: HOME | End: 2025-02-07
Attending: FAMILY MEDICINE
Payer: MEDICAID

## 2025-02-07 VITALS
HEIGHT: 72 IN | DIASTOLIC BLOOD PRESSURE: 99 MMHG | OXYGEN SATURATION: 95 % | SYSTOLIC BLOOD PRESSURE: 138 MMHG | HEART RATE: 79 BPM | WEIGHT: 255 LBS | RESPIRATION RATE: 18 BRPM | TEMPERATURE: 98 F | BODY MASS INDEX: 34.54 KG/M2

## 2025-02-07 DIAGNOSIS — I10 UNCONTROLLED HYPERTENSION: Primary | ICD-10-CM

## 2025-02-07 DIAGNOSIS — R07.89 OTHER CHEST PAIN: ICD-10-CM

## 2025-02-07 DIAGNOSIS — G44.89 OTHER HEADACHE SYNDROME: ICD-10-CM

## 2025-02-07 LAB
ANION GAP SERPL CALCULATED.3IONS-SCNC: 13 MMOL/L (ref 3–16)
BASOPHILS # BLD: 0.1 K/UL (ref 0–0.1)
BASOPHILS NFR BLD: 1.6 %
BUN SERPL-MCNC: 11 MG/DL (ref 6–20)
CALCIUM SERPL-MCNC: 9 MG/DL (ref 8.5–10.5)
CHLORIDE SERPL-SCNC: 102 MMOL/L (ref 98–107)
CO2 SERPL-SCNC: 23 MMOL/L (ref 20–30)
CREAT SERPL-MCNC: 0.7 MG/DL (ref 0.4–1.2)
EOSINOPHIL # BLD: 0.4 K/UL (ref 0–0.4)
EOSINOPHIL NFR BLD: 7.7 %
ERYTHROCYTE [DISTWIDTH] IN BLOOD BY AUTOMATED COUNT: 14 % (ref 11–16)
GFR SERPLBLD CREATININE-BSD FMLA CKD-EPI: >90 ML/MIN/{1.73_M2}
GLUCOSE BLD-MCNC: 122 MG/DL (ref 74–106)
GLUCOSE SERPL-MCNC: 213 MG/DL (ref 74–106)
HCT VFR BLD AUTO: 43.2 % (ref 40–54)
HGB BLD-MCNC: 15.2 G/DL (ref 13–18)
IMM GRANULOCYTES # BLD: 0 K/UL
IMM GRANULOCYTES NFR BLD: 0.4 % (ref 0–5)
LYMPHOCYTES # BLD: 1.9 K/UL (ref 1.5–4)
LYMPHOCYTES NFR BLD: 33 %
MCH RBC QN AUTO: 33.1 PG (ref 27–32)
MCHC RBC AUTO-ENTMCNC: 35.2 G/DL (ref 31–35)
MCV RBC AUTO: 94.1 FL (ref 80–100)
MONOCYTES # BLD: 0.4 K/UL (ref 0.2–0.8)
MONOCYTES NFR BLD: 7.1 %
NEUTROPHILS # BLD: 2.8 K/UL (ref 2–7.5)
NEUTS SEG NFR BLD: 50.2 %
PERFORMED ON: ABNORMAL
PLATELET # BLD AUTO: 132 K/UL (ref 150–400)
PMV BLD AUTO: 9.6 FL (ref 6–10)
POTASSIUM SERPL-SCNC: 4 MMOL/L (ref 3.4–5.1)
RBC # BLD AUTO: 4.59 M/UL (ref 4.5–6)
SODIUM SERPL-SCNC: 138 MMOL/L (ref 136–145)
TROPONIN, HIGH SENSITIVITY: 7 NG/L (ref 0–22)
TROPONIN, HIGH SENSITIVITY: 7 NG/L (ref 0–22)
WBC # BLD AUTO: 5.6 K/UL (ref 4–11)

## 2025-02-07 PROCEDURE — 80048 BASIC METABOLIC PNL TOTAL CA: CPT

## 2025-02-07 PROCEDURE — 85025 COMPLETE CBC W/AUTO DIFF WBC: CPT

## 2025-02-07 PROCEDURE — 6370000000 HC RX 637 (ALT 250 FOR IP): Performed by: FAMILY MEDICINE

## 2025-02-07 PROCEDURE — 96374 THER/PROPH/DIAG INJ IV PUSH: CPT

## 2025-02-07 PROCEDURE — 70450 CT HEAD/BRAIN W/O DYE: CPT

## 2025-02-07 PROCEDURE — 6360000002 HC RX W HCPCS: Performed by: FAMILY MEDICINE

## 2025-02-07 PROCEDURE — 84484 ASSAY OF TROPONIN QUANT: CPT

## 2025-02-07 PROCEDURE — 36415 COLL VENOUS BLD VENIPUNCTURE: CPT

## 2025-02-07 PROCEDURE — 99285 EMERGENCY DEPT VISIT HI MDM: CPT

## 2025-02-07 PROCEDURE — 71045 X-RAY EXAM CHEST 1 VIEW: CPT

## 2025-02-07 RX ORDER — LISINOPRIL AND HYDROCHLOROTHIAZIDE 12.5; 2 MG/1; MG/1
1 TABLET ORAL DAILY
Qty: 30 TABLET | Refills: 0 | Status: SHIPPED | OUTPATIENT
Start: 2025-02-07

## 2025-02-07 RX ORDER — KETOTIFEN FUMARATE 0.35 MG/ML
1 SOLUTION/ DROPS OPHTHALMIC 2 TIMES DAILY
COMMUNITY

## 2025-02-07 RX ORDER — KETOROLAC TROMETHAMINE 30 MG/ML
30 INJECTION, SOLUTION INTRAMUSCULAR; INTRAVENOUS ONCE
Status: COMPLETED | OUTPATIENT
Start: 2025-02-07 | End: 2025-02-07

## 2025-02-07 RX ORDER — ASPIRIN 81 MG/1
324 TABLET, CHEWABLE ORAL ONCE
Status: DISCONTINUED | OUTPATIENT
Start: 2025-02-07 | End: 2025-02-07

## 2025-02-07 RX ORDER — CLONIDINE HYDROCHLORIDE 0.1 MG/1
0.2 TABLET ORAL ONCE
Status: COMPLETED | OUTPATIENT
Start: 2025-02-07 | End: 2025-02-07

## 2025-02-07 RX ORDER — ROSUVASTATIN CALCIUM 10 MG/1
10 TABLET, COATED ORAL DAILY
COMMUNITY

## 2025-02-07 RX ORDER — ACETAMINOPHEN 500 MG
1000 TABLET ORAL ONCE
Status: COMPLETED | OUTPATIENT
Start: 2025-02-07 | End: 2025-02-07

## 2025-02-07 RX ADMIN — KETOROLAC TROMETHAMINE 30 MG: 30 INJECTION, SOLUTION INTRAMUSCULAR; INTRAVENOUS at 16:31

## 2025-02-07 RX ADMIN — CLONIDINE HYDROCHLORIDE 0.2 MG: 0.1 TABLET ORAL at 14:52

## 2025-02-07 RX ADMIN — ACETAMINOPHEN 1000 MG: 500 TABLET, FILM COATED ORAL at 13:48

## 2025-02-07 ASSESSMENT — PAIN DESCRIPTION - DESCRIPTORS
DESCRIPTORS: ACHING
DESCRIPTORS: ACHING;THROBBING
DESCRIPTORS: ACHING

## 2025-02-07 ASSESSMENT — PAIN SCALES - GENERAL
PAINLEVEL_OUTOF10: 10
PAINLEVEL_OUTOF10: 8
PAINLEVEL_OUTOF10: 10
PAINLEVEL_OUTOF10: 9

## 2025-02-07 ASSESSMENT — PAIN - FUNCTIONAL ASSESSMENT: PAIN_FUNCTIONAL_ASSESSMENT: 0-10

## 2025-02-07 ASSESSMENT — LIFESTYLE VARIABLES: HOW OFTEN DO YOU HAVE A DRINK CONTAINING ALCOHOL: NEVER

## 2025-02-07 ASSESSMENT — PAIN DESCRIPTION - LOCATION
LOCATION: HEAD

## 2025-02-07 ASSESSMENT — PAIN DESCRIPTION - PAIN TYPE: TYPE: ACUTE PAIN

## 2025-02-07 NOTE — DISCHARGE INSTRUCTIONS
Results were discussed with patient.  Advised to take medication as directed.  Come back to the ER symptoms get worse.

## 2025-02-07 NOTE — ED NOTES
Patient called on call bell- pt wanting light out in room and something for headache- notified doctor of request- Dr. Gaines to room to talk with patient

## 2025-02-07 NOTE — ED NOTES
Reviewed discharge plan with Titi Valencia.  Encouraged him to f/u with Haily Mock APRN - CNP and he understood.  NAD noted on discharge, gait steady.  Reviewed discharge prescription for:     Current Discharge Medication List        START taking these medications    Details   lisinopril-hydroCHLOROthiazide (PRINZIDE;ZESTORETIC) 20-12.5 MG per tablet Take 1 tablet by mouth daily  Qty: 30 tablet, Refills: 0             Titi states understanding of how and when to take medications.      Electronically signed by Jessica Gutierrez RN on 2/7/2025 at 5:07 PM

## 2025-02-07 NOTE — ED NOTES
Pt c/o headache.  He does request medication for his headache.  Spoke with md.  Plan to give 1 g tylenol x 1 now po.

## 2025-02-07 NOTE — ED NOTES
Went in and checked pt blood sugar. It was 122. Pt also stated that his head is still hurting bad. MD made aware.

## 2025-02-07 NOTE — ED TRIAGE NOTES
Pt c/o having high blood pressure for about a week.  He also c/o a 10/10 headache.  
(!) 151/105 (!) 143/95   Pulse: 84  74 76   Resp: 22  15 16   Temp:       TempSrc:       SpO2: 96%  98% 96%   Weight:       Height:           Patient was given the following medications:  Medications   ketorolac (TORADOL) injection 30 mg (has no administration in time range)   acetaminophen (TYLENOL) tablet 1,000 mg (1,000 mg Oral Given 2/7/25 1348)   cloNIDine (CATAPRES) tablet 0.2 mg (0.2 mg Oral Given 2/7/25 1452)            Is this patient to be included in the SEP-1 Core Measure due to severe sepsis or septic shock?   No   Exclusion criteria - the patient is NOT to be included for SEP-1 Core Measure due to:       PAST MEDICAL HISTORY      has a past medical history of Anxiety, Hypertension, Hypothyroidism, Staph infection, Substance abuse (HCC), and Type 2 diabetes mellitus without complication (HCC).     CC/HPI Summary, DDx, ED Course, and Reassessment:     guilherme Valencia is a 54 y.o. male who presents with past medical history diabetes, hypertension and hyperlipidemia and tobacco abuse and cirrhosis of the liver from hepatitis C who presented here today because elevated blood pressure, headache and chest pain.  Patient said this started about few days ago and progressing getting worse.  Patient said that he has some sweating.  And also some nausea and vomiting.  Denies any radiation of pain.    Differential diagnosis pneumonia, angina, pleural effusion, pneumothorax    Chest x-ray was done which is unremarkable.  CBC, CMP was also done which were unremarkable.  Troponin was done x 2 and it was negative.  Patient was ruled out.  Patient was given some Tylenol and Toradol for the headache which helped the headache.  CT of the head was also done which was unremarkable.  Patient discharged to follow-up with primary care in 2 days.         CONSULTS: (Who and What was discussed)  None    Discussion with Other Profesionals : None    Social Determinants : None    Chronic Conditions:     Records Reviewed :

## 2025-02-11 NOTE — CARE COORDINATION
Attempted to follow up with patient via phone. Reached voicemail and left message with return number.

## 2025-02-12 NOTE — ED PROVIDER NOTES
Murtaza Gaines MD  Physician  Specialty: Emergency Medicine     Date of Service: 2/7/2025 10:48 AM     Expand All Collapse All               [facility]  EMERGENCY DEPARTMENT ENCOUNTER          Pt Name: Titi Valencia  MRN: [id number]  Birthdate 1970  Date of evaluation: 2/7/2025  Provider: Murtaza Gaines MD  PCP: Haily Mock APRN - CNP  Note Started: 11:44 AM EST 2/7/25     CHIEF COMPLAINT            Chief Complaint   Patient presents with    Hypertension    Headache         HISTORY OF PRESENT ILLNESS: 1 or more Elements      History from : Patient     Limitations to history : None     Titi Valencia is a 54 y.o. male who presents with past medical history diabetes, hypertension and hyperlipidemia and tobacco abuse and cirrhosis of the liver from hepatitis C who presented here today because elevated blood pressure, headache and chest pain.  Patient said this started about few days ago and progressing getting worse.  Patient said that he has some sweating.  And also some nausea and vomiting.  Denies any radiation of pain.     Nursing Notes were all reviewed and agreed with or any disagreements were addressed in the HPI.     REVIEW OF SYSTEMS :       Review of Systems      systems reviewed and negative except as in HPI/MDM     PAST MEDICAL HISTORY      Past Medical History        Past Medical History:   Diagnosis Date    Anxiety      Hypertension      Hypothyroidism      Staph infection      Substance abuse (HCC)      Type 2 diabetes mellitus without complication (HCC)              SURGICAL HISTORY      Past Surgical History         Past Surgical History:   Procedure Laterality Date    ABSCESS DRAINAGE        APPENDECTOMY        BACK SURGERY                CURRENTMEDICATIONS        Discharge Medication List as of 2/7/2025  4:27 PM              CONTINUE these medications which have NOT CHANGED     Details   rosuvastatin (CRESTOR) 10 MG tablet Take 1 tablet by mouth

## 2025-02-17 ENCOUNTER — HOSPITAL ENCOUNTER (OUTPATIENT)
Facility: HOSPITAL | Age: 55
Discharge: HOME | End: 2025-02-17
Payer: MEDICAID

## 2025-02-17 LAB
ALBUMIN SERPL-MCNC: 4.1 G/DL (ref 3.4–4.8)
ALBUMIN/GLOB SERPL: 1.2 {RATIO} (ref 0.8–2)
ALP SERPL-CCNC: 71 U/L (ref 25–100)
ALT SERPL-CCNC: 32 U/L (ref 4–36)
ANION GAP SERPL CALCULATED.3IONS-SCNC: 11 MMOL/L (ref 3–16)
AST SERPL-CCNC: 29 U/L (ref 8–33)
BILIRUB SERPL-MCNC: 1.5 MG/DL (ref 0.3–1.2)
BUN SERPL-MCNC: 16 MG/DL (ref 6–20)
CALCIUM SERPL-MCNC: 9.3 MG/DL (ref 8.3–10.6)
CHLORIDE SERPL-SCNC: 102 MMOL/L (ref 98–107)
CO2 SERPL-SCNC: 24 MMOL/L (ref 20–30)
CREAT SERPL-MCNC: 0.9 MG/DL (ref 0.4–1.2)
ERYTHROCYTE [DISTWIDTH] IN BLOOD BY AUTOMATED COUNT: 13.6 % (ref 11–16)
GFR SERPLBLD CREATININE-BSD FMLA CKD-EPI: >90 ML/MIN/{1.73_M2}
GLOBULIN SER CALC-MCNC: 3.5 G/DL
GLUCOSE SERPL-MCNC: 195 MG/DL (ref 74–106)
HCT VFR BLD AUTO: 43.7 % (ref 40–54)
HGB BLD-MCNC: 15.4 G/DL (ref 13–18)
INR PPP: 0.98 (ref 0.9–1.1)
MCH RBC QN AUTO: 34.5 PG (ref 27–32)
MCHC RBC AUTO-ENTMCNC: 35.2 G/DL (ref 31–35)
MCV RBC AUTO: 98 FL (ref 80–100)
PLATELET # BLD AUTO: 122 K/UL (ref 150–400)
PMV BLD AUTO: 10.1 FL (ref 6–10)
POTASSIUM SERPL-SCNC: 4.5 MMOL/L (ref 3.4–5.1)
PROT SERPL-MCNC: 7.6 G/DL (ref 6.4–8.3)
PROTHROMBIN TIME: 12.9 SEC (ref 12.1–14)
RBC # BLD AUTO: 4.46 M/UL (ref 4.5–6)
SODIUM SERPL-SCNC: 137 MMOL/L (ref 136–145)
WBC # BLD AUTO: 6 K/UL (ref 4–11)

## 2025-02-17 PROCEDURE — 82105 ALPHA-FETOPROTEIN SERUM: CPT

## 2025-02-17 PROCEDURE — 80053 COMPREHEN METABOLIC PANEL: CPT

## 2025-02-17 PROCEDURE — 87522 HEPATITIS C REVRS TRNSCRPJ: CPT

## 2025-02-17 PROCEDURE — 85027 COMPLETE CBC AUTOMATED: CPT

## 2025-02-17 PROCEDURE — 36415 COLL VENOUS BLD VENIPUNCTURE: CPT

## 2025-02-17 PROCEDURE — 85610 PROTHROMBIN TIME: CPT

## 2025-02-19 LAB
AFP-TM SERPL-MCNC: 13.7 UG/L
HCV RNA SERPL NAA+PROBE-ACNC: NOT DETECTED IU/ML
HCV RNA SERPL NAA+PROBE-LOG IU: NOT DETECTED LOG IU/ML
HCV RNA SERPL QL NAA+PROBE: NOT DETECTED

## 2025-02-21 ENCOUNTER — TELEPHONE (OUTPATIENT)
Dept: GASTROENTEROLOGY | Facility: CLINIC | Age: 55
End: 2025-02-21
Payer: MEDICAID

## 2025-02-21 DIAGNOSIS — B18.2 CHRONIC HEPATITIS C WITHOUT HEPATIC COMA: Primary | ICD-10-CM

## 2025-02-21 NOTE — TELEPHONE ENCOUNTER
Labs at end of Mavyret treatment completed 2/17/2025 showed HCV RNA not detected. Should have labs again 3 mo s/p treatment completion (he can have these in [place] again). His bilirubin is elevated, he has to stop drinking alcohol. Please let him know.

## 2025-02-26 NOTE — TELEPHONE ENCOUNTER
Patient called back and was notified that HCV RNA is no longer detected. His 12 week post treatment appt is made and we will call patient to remind him to do his labs 1-2 weeks prior.   Pt verbalized understanding that bilirubin is elevated and he needs to abstain from alcohol.    SEBASTIÁN Tristan

## 2025-02-26 NOTE — TELEPHONE ENCOUNTER
Unable to reach patient, left vm.  (Pt needs labs around the first week of May, follow up 1-2 weeks after)

## 2025-03-12 ENCOUNTER — SPECIALTY PHARMACY (OUTPATIENT)
Dept: PHARMACY | Facility: HOSPITAL | Age: 55
End: 2025-03-12
Payer: MEDICAID

## 2025-03-12 NOTE — PROGRESS NOTES
[facility]  Specialty Pharmacy Patient Management Program  Hepatitis C Reassessment     Albin Ivory was referred by their provider [facility] for [facility] treatment program. A follow-up outreach was conducted, including assessment of continued therapy appropriateness, medication adherence, and side effect incidence and management for Mavyret.     Patient reported taking Mavyret as directed. He stated that he has had minimal ADRs with therapy but did notice it made him more fatigued. He has not completed therapy and EOT labs showed HCV not detected. Instructed patient to repeat labs 12-weeks after completion of therapy and he will follow-up with [facility] on 5/23/25.     Changes to Insurance Coverage or Financial Support  None     Relevant Past Medical History and Comorbidities  Relevant medical history and concomitant health conditions were discussed with the patient. The patient's chart has been reviewed for relevant past medical history and comorbid health conditions and updated as necessary.   Past Medical History:   Diagnosis Date    Back injury     Hypertension      Social History     Socioeconomic History    Marital status: Legally    Tobacco Use    Smoking status: Every Day     Current packs/day: 1.50     Types: Cigarettes   Vaping Use    Vaping status: Never Used   Substance and Sexual Activity    Alcohol use: Not Currently     Comment: beer every couple days    Drug use: Yes     Types: Marijuana, Heroin, Cocaine(coke)     Comment: sober since 2012    Sexual activity: Defer       Problem list reviewed by Esther Montgomery RP on 3/12/2025 at  5:21 PM      Hospitalizations and Urgent Care Since Last Assessment  ED Visits, Admissions, or Hospitalizations: None   Urgent Office Visits: None     Allergies  Known allergies and reactions were discussed with the patient. The patient's chart has been reviewed for allergy information and updated as necessary.    No Known Allergies        Relevant Laboratory Values  Relevant laboratory values were discussed with the patient. The following specialty medication dose adjustment(s) are recommended:     HCV RNA Quantitative:     1/17/25: HCV RNA <15 IU/mL  2/17/25: HCV RNA NOT DETECTED     Lab Results   Component Value Date    GLUCOSE 250 (H) 01/17/2025    CALCIUM 9.4 01/17/2025     (L) 01/17/2025    K 4.5 01/17/2025    CO2 25.6 01/17/2025    CL 98 01/17/2025    BUN 11 01/17/2025    CREATININE 0.80 01/17/2025    EGFRIFAFRI >60 08/16/2021    EGFRIFNONA >60 08/16/2021    BCR 13.8 01/17/2025    ANIONGAP 10.4 01/17/2025     Lab Results   Component Value Date    CHOL 107 06/02/2024    TRIG 151 (H) 06/02/2024    HDL 24 (L) 06/02/2024    LDL 57 06/02/2024       Current Medication List  This medication list has been reviewed with the patient and evaluated for any interactions or necessary modifications/recommendations, and updated to include all prescription medications, OTC medications, and supplements the patient is currently taking.  This list reflects what is contained in the patient's profile, which has also been marked as reviewed to communicate to other providers it is the most up to date version of the patient's current medication therapy.     Current Outpatient Medications:     albuterol sulfate  (90 Base) MCG/ACT inhaler, Inhale 2 puffs., Disp: , Rfl:     Alcohol Swabs (Global Alcohol Prep Ease) 70 % pads, , Disp: , Rfl:     atorvastatin (LIPITOR) 80 MG tablet, Take 1 tablet by mouth Daily. (Patient not taking: Reported on 1/17/2025), Disp: , Rfl:     busPIRone (BUSPAR) 5 MG tablet, Take 1 tablet by mouth 3 times a day. (Patient not taking: Reported on 1/17/2025), Disp: , Rfl:     fenofibrate (TRICOR) 48 MG tablet, Take 1 tablet by mouth Daily for 30 days., Disp: 30 tablet, Rfl: 0    FLUoxetine (PROzac) 20 MG capsule, Take 1 capsule by mouth Daily. (Patient not taking: Reported on 1/17/2025), Disp: , Rfl:      Glecaprevir-Pibrentasvir (Mavyret) 100-40 MG tablet, Take 3 tablets by mouth Daily. Take all 3 tablets once daily with food, Disp: 84 tablet, Rfl: 1    insulin aspart (NovoLOG FlexPen) 100 UNIT/ML solution pen-injector sc pen, Inject 5 Units under the skin into the appropriate area as directed 3 (Three) Times a Day With Meals for 30 days., Disp: 4.5 mL, Rfl: 0    insulin aspart (NovoLOG FlexPen) 100 UNIT/ML solution pen-injector sc pen, Inject 5 Units under the skin into the appropriate area as directed 3 (Three) Times a Day With Meals., Disp: , Rfl:     insulin detemir (Levemir FlexPen) 100 UNIT/ML injection, Inject 15 Units under the skin into the appropriate area as directed Daily for 30 days., Disp: 5 mL, Rfl: 0    insulin detemir (Levemir FlexPen) 100 UNIT/ML injection, Inject 20 Units under the skin into the appropriate area as directed Every Night., Disp: , Rfl:     levothyroxine (SYNTHROID, LEVOTHROID) 25 MCG tablet, Take 1 tablet by mouth Daily. (Patient not taking: Reported on 1/31/2025), Disp: , Rfl:     lisinopril (PRINIVIL,ZESTRIL) 5 MG tablet, Take 1 tablet by mouth Daily. (Patient not taking: Reported on 1/17/2025), Disp: , Rfl:     metFORMIN (Glucophage) 500 MG tablet, Take 1 tablet by mouth Daily With Breakfast for 7 days, THEN 1 tablet 2 (Two) Times a Day With Meals for 7 days, THEN 1 tablet 3 (Three) Times a Day for 7 days, THEN 2 tablets 2 (Two) Times a Day With Meals for 7 days., Disp: 70 tablet, Rfl: 0    Omega-3 Fatty Acids (fish oil) 1000 MG capsule capsule, Take 1 capsule by mouth Daily With Breakfast., Disp: , Rfl:     Victoza 18 MG/3ML solution pen-injector injection, Inject 1.8 mg under the skin into the appropriate area as directed Daily., Disp: , Rfl:     vitamin D (ERGOCALCIFEROL) 1.25 MG (36060 UT) capsule capsule, Take 1 capsule by mouth Every 7 (Seven) Days. (Patient not taking: Reported on 1/17/2025), Disp: , Rfl:     Medicines reviewed by Esther Montgomery RP on 3/12/2025 at   5:21 PM    Drug Interactions  Patient was previously on Atorvastatin but is not longer taking medication (last filled on 11/15 x 30 ds)    Adverse Drug Reactions  Medication tolerability: Tolerating with no to minimal ADRs  Medication plan: Continue therapy with normal follow-up    Plan for ADR Management: N/A    Adherence, Self-Administration, and Current Therapy Problems  Adherence related to the patient's specialty therapy was discussed with the patient. The Adherence segment of this outreach has been reviewed and updated.     Adherence Questions  Linked Medication(s) Assessed: Glecaprevir-Pibrentasvir (Mavyret)  On average, how many doses/injections does the patient miss per month?: 0  What are the identified reasons for non-adherence or missed doses? : no problems identified  What is the estimated medication adherence level?: %  Based on the patient/caregiver response and refill history, does this patient require an MTP to track adherence improvements?: no    Additional Barriers to Patient Self-Administration: N/A  Methods for Supporting Patient Self-Administration: N/A    Open Medication Therapy Problems  No medication therapy recommendations to display    Goals of Therapy  Goals related to the patient's specialty therapy were discussed with the patient. The Patient Goals segment of this outreach has been reviewed and updated.   Goals Addressed Today        Specialty Pharmacy General Goal      HEPCRNA not detected at 12 weeks post medication treatment completed                Quality of Life Assessment   Quality of Life related to the patient's enrollment in the patient management program and services provided was discussed with the patient. The QOL segment of this outreach has been reviewed and updated.  Quality of Life Improvement Scale: 10-Significantly better    Reassessment Plan & Follow-Up  1. Medication Therapy Changes: None   2. Related Plans, Therapy Recommendations, or Issues to Be Addressed:  Patient will complete EOT SVR 12 labs to ensure Hep C is cured 12 weeks after therapy completion.   3. Pharmacist to repeat assessment in 4-weeks after end of treatment labs performed.  4. Care Coordinator to set up future refill outreaches, coordinate prescription delivery, and escalate clinical questions to pharmacist.    Attestation  Therapeutic appropriateness: Appropriate   I attest the patient was actively involved in and has agreed to the above plan of care.  If the prescribed therapy is at any point deemed not appropriate based on the current or future assessments, a consultation will be initiated with the patient's specialty care provider to determine the best course of action. The revised plan of therapy will be documented along with any required assessments and/or additional patient education provided.     Esther Montgomery RPH  3/12/2025  17:21 EDT

## 2025-05-19 ENCOUNTER — HOSPITAL ENCOUNTER (OUTPATIENT)
Facility: HOSPITAL | Age: 55
Discharge: HOME | End: 2025-05-19
Payer: MEDICAID

## 2025-05-19 LAB
ALBUMIN SERPL-MCNC: 4.3 G/DL (ref 3.4–4.8)
ALBUMIN/GLOB SERPL: 1.2 {RATIO} (ref 0.8–2)
ALP SERPL-CCNC: 73 U/L (ref 25–100)
ALT SERPL-CCNC: 33 U/L (ref 4–36)
ANION GAP SERPL CALCULATED.3IONS-SCNC: 14 MMOL/L (ref 3–16)
AST SERPL-CCNC: 23 U/L (ref 8–33)
BILIRUB SERPL-MCNC: 0.6 MG/DL (ref 0.3–1.2)
BUN SERPL-MCNC: 14 MG/DL (ref 6–20)
CALCIUM SERPL-MCNC: 9.9 MG/DL (ref 8.3–10.6)
CHLORIDE SERPL-SCNC: 95 MMOL/L (ref 98–107)
CO2 SERPL-SCNC: 23 MMOL/L (ref 20–30)
CREAT SERPL-MCNC: 0.8 MG/DL (ref 0.9–1.3)
GFR SERPLBLD CREATININE-BSD FMLA CKD-EPI: >90 ML/MIN/{1.73_M2}
GLOBULIN SER CALC-MCNC: 3.7 G/DL
GLUCOSE SERPL-MCNC: 300 MG/DL (ref 74–106)
POTASSIUM SERPL-SCNC: 4.4 MMOL/L (ref 3.4–5.1)
PROT SERPL-MCNC: 8 G/DL (ref 6.4–8.3)
SODIUM SERPL-SCNC: 132 MMOL/L (ref 136–145)

## 2025-05-19 PROCEDURE — 80053 COMPREHEN METABOLIC PANEL: CPT

## 2025-05-19 PROCEDURE — 87522 HEPATITIS C REVRS TRNSCRPJ: CPT

## 2025-05-19 PROCEDURE — 36415 COLL VENOUS BLD VENIPUNCTURE: CPT

## 2025-05-21 LAB
HCV RNA SERPL NAA+PROBE-ACNC: NOT DETECTED IU/ML
HCV RNA SERPL NAA+PROBE-LOG IU: NOT DETECTED LOG IU/ML
HCV RNA SERPL QL NAA+PROBE: NOT DETECTED

## 2025-05-23 ENCOUNTER — SPECIALTY PHARMACY (OUTPATIENT)
Dept: PHARMACY | Facility: HOSPITAL | Age: 55
End: 2025-05-23
Payer: MEDICAID

## 2025-05-23 VITALS
TEMPERATURE: 97.5 F | BODY MASS INDEX: 39.34 KG/M2 | OXYGEN SATURATION: 95 % | SYSTOLIC BLOOD PRESSURE: 163 MMHG | WEIGHT: 266.4 LBS | HEART RATE: 106 BPM | DIASTOLIC BLOOD PRESSURE: 112 MMHG

## 2025-05-23 DIAGNOSIS — R77.2 ELEVATED AFP: ICD-10-CM

## 2025-05-23 DIAGNOSIS — Z86.19 HISTORY OF HEPATITIS C VIRUS INFECTION: Primary | ICD-10-CM

## 2025-05-23 DIAGNOSIS — F10.10 ALCOHOL ABUSE: ICD-10-CM

## 2025-05-23 DIAGNOSIS — K70.30 ALCOHOLIC CIRRHOSIS OF LIVER WITHOUT ASCITES: ICD-10-CM

## 2025-05-23 PROCEDURE — G0463 HOSPITAL OUTPT CLINIC VISIT: HCPCS

## 2025-05-23 RX ORDER — IBUPROFEN 800 MG/1
1 TABLET, FILM COATED ORAL 3 TIMES DAILY
COMMUNITY
Start: 2025-03-18

## 2025-05-23 RX ORDER — KETOTIFEN FUMARATE 0.35 MG/ML
1 SOLUTION/ DROPS OPHTHALMIC 2 TIMES DAILY
COMMUNITY

## 2025-05-23 RX ORDER — ROSUVASTATIN CALCIUM 10 MG/1
10 TABLET, COATED ORAL DAILY
COMMUNITY

## 2025-05-23 NOTE — LETTER
May 23, 2025     CAITLIN Mccain  [address]  [facility] [address]    Patient: Albin Ivory   YOB: 1970   Date of Visit: 2025       Dear CAITLIN Mccain    Albin Ivory was in my office today. Below is a copy of my note.    If you have questions, please do not hesitate to call me. I look forward to following Albin along with you.         Sincerely,        [facility]        CC: No Recipients         Follow Up Note     Date: 2025   Patient Name: Albin Ivory  MRN: [id number]  : 1970     Primary Care Provider: Siobhan Lehman APRN     Chief Complaint   Patient presents with   • Hepatitis C     Pt here for 12 wk post treatment follow up     History of present illness:   2025  Albin Ivory is a 54 y.o. male who is here today for follow up regarding Hepatitis C.    He started taking Mavyret 3 tabs p.o. once daily 2024.  He took this as directed for 8 weeks.  He did not miss any doses of medication.  He had labs prior to this appointment would like to discuss those results.  He is moving to [place] in less than 2 weeks.  No current illicit drug use.  Does admit continued alcohol use.  He admits he has not been taking his medications as directed for hypertension.  He notes that his blood pressure is high.  He does not want any other treatment for this at this moment.    Interval History:  2024  He found out about having Hepatitis C infection approx 5 years ago. He has not had prior treatment for hepatitis. Reports no known personal history of liver disease including other viral hepatitis. There is no known family history of liver disease or cirrhosis. He admits to previous IVDU and intranasal drug use. Has not used IV drugs in 8 years. He does have nonprofessional tattoos. He does currently drink alcohol, drinks 6 - 16oz  beers per day. Has drank daily for most of his life. He denies  current illicit drug us. He does use marijuana. He has had  recent labs. He has had previous vaccinations for Hepatitis A and B. He does not drive. Lives with his friend currently. He does not have a phone.      He has not had previous colonoscopy. He does have any plans to have a colonoscopy. There is no known family history of colon cancer or colon polyps.    Subjective     Past Medical History:   Diagnosis Date   • Back injury    • Hypertension      Past Surgical History:   Procedure Laterality Date   • APPENDECTOMY     • BACK SURGERY  2017     History reviewed. No pertinent family history.  Social History     Socioeconomic History   • Marital status: Legally    Tobacco Use   • Smoking status: Every Day     Current packs/day: 1.50     Types: Cigarettes   Vaping Use   • Vaping status: Never Used   Substance and Sexual Activity   • Alcohol use: Not Currently     Comment: beer every couple days   • Drug use: Yes     Types: Marijuana, Heroin, Cocaine(coke)     Comment: sober since 2012   • Sexual activity: Defer     Current Outpatient Medications:   •  albuterol sulfate  (90 Base) MCG/ACT inhaler, Inhale 2 puffs., Disp: , Rfl:   •  Alcohol Swabs (Global Alcohol Prep Ease) 70 % pads, , Disp: , Rfl:   •  busPIRone (BUSPAR) 5 MG tablet, Take 1 tablet by mouth 3 times a day., Disp: , Rfl:   •  fenofibrate (TRICOR) 48 MG tablet, Take 1 tablet by mouth Daily for 30 days., Disp: 30 tablet, Rfl: 0  •  FLUoxetine (PROzac) 20 MG capsule, Take 1 capsule by mouth Daily., Disp: , Rfl:   •  ibuprofen (ADVIL,MOTRIN) 800 MG tablet, Take 1 tablet by mouth 3 times a day., Disp: , Rfl:   •  insulin aspart (NovoLOG FlexPen) 100 UNIT/ML solution pen-injector sc pen, Inject 5 Units under the skin into the appropriate area as directed 3 (Three) Times a Day With Meals., Disp: , Rfl:   •  insulin detemir (Levemir FlexPen) 100 UNIT/ML injection, Inject 20 Units under the skin into the appropriate area as directed Every Night., Disp: , Rfl:   •  Ketotifen Fumarate (ZADITOR) 0.035 %  solution, Administer 1 drop to both eyes 2 (Two) Times a Day., Disp: , Rfl:   •  levothyroxine (SYNTHROID, LEVOTHROID) 25 MCG tablet, Take 1 tablet by mouth Daily., Disp: , Rfl:   •  lisinopril (PRINIVIL,ZESTRIL) 5 MG tablet, Take 1 tablet by mouth Daily., Disp: , Rfl:   •  Omega-3 Fatty Acids (fish oil) 1000 MG capsule capsule, Take 1 capsule by mouth Daily With Breakfast., Disp: , Rfl:   •  rosuvastatin (CRESTOR) 10 MG tablet, Take 1 tablet by mouth Daily., Disp: , Rfl:   •  Victoza 18 MG/3ML solution pen-injector injection, Inject 1.8 mg under the skin into the appropriate area as directed Daily., Disp: , Rfl:   •  vitamin D (ERGOCALCIFEROL) 1.25 MG (78488 UT) capsule capsule, Take 1 capsule by mouth Every 7 (Seven) Days., Disp: , Rfl:   •  Glecaprevir-Pibrentasvir (Mavyret) 100-40 MG tablet, Take 3 tablets by mouth Daily. Take all 3 tablets once daily with food, Disp: 84 tablet, Rfl: 1  •  insulin aspart (NovoLOG FlexPen) 100 UNIT/ML solution pen-injector sc pen, Inject 5 Units under the skin into the appropriate area as directed 3 (Three) Times a Day With Meals for 30 days., Disp: 4.5 mL, Rfl: 0  •  insulin detemir (Levemir FlexPen) 100 UNIT/ML injection, Inject 15 Units under the skin into the appropriate area as directed Daily for 30 days., Disp: 5 mL, Rfl: 0  •  metFORMIN (Glucophage) 500 MG tablet, Take 1 tablet by mouth Daily With Breakfast for 7 days, THEN 1 tablet 2 (Two) Times a Day With Meals for 7 days, THEN 1 tablet 3 (Three) Times a Day for 7 days, THEN 2 tablets 2 (Two) Times a Day With Meals for 7 days., Disp: 70 tablet, Rfl: 0    No Known Allergies    The following portions of the patient's history were reviewed and updated as appropriate: allergies, current medications, past family history, past medical history, past social history, past surgical history and problem list.    Objective     Physical Exam  Vitals:    05/23/25 1035   BP: (!) 163/112   BP Location: Left arm   Patient Position:  Sitting   Cuff Size: Adult   Pulse: 106   Temp: 97.5 °F (36.4 °C)   SpO2: 95%   Weight: 121 kg (266 lb 6.4 oz)     Results Review:   I reviewed the patient's new clinical results.    Lab on 01/17/2025   Component Date Value Ref Range Status   • Hepatitis C Quantitation 01/17/2025 <15  IU/mL Final                    HCV RNA detected   • Test Information 01/17/2025 Comment   Final    The quantitative range of this assay is 15 IU/mL to 100 million IU/mL.   • Glucose 01/17/2025 250 (H)  65 - 99 mg/dL Final   • BUN 01/17/2025 11  6 - 20 mg/dL Final   • Creatinine 01/17/2025 0.80  0.76 - 1.27 mg/dL Final   • Sodium 01/17/2025 134 (L)  136 - 145 mmol/L Final   • Potassium 01/17/2025 4.5  3.5 - 5.2 mmol/L Final   • Chloride 01/17/2025 98  98 - 107 mmol/L Final   • CO2 01/17/2025 25.6  22.0 - 29.0 mmol/L Final   • Calcium 01/17/2025 9.4  8.6 - 10.5 mg/dL Final   • Total Protein 01/17/2025 8.1  6.0 - 8.5 g/dL Final   • Albumin 01/17/2025 4.1  3.5 - 5.2 g/dL Final   • ALT (SGPT) 01/17/2025 24  1 - 41 U/L Final   • AST (SGOT) 01/17/2025 24  1 - 40 U/L Final   • Alkaline Phosphatase 01/17/2025 80  39 - 117 U/L Final   • Total Bilirubin 01/17/2025 1.3 (H)  0.0 - 1.2 mg/dL Final   • Globulin 01/17/2025 4.0  gm/dL Final   • A/G Ratio 01/17/2025 1.0  g/dL Final   • BUN/Creatinine Ratio 01/17/2025 13.8  7.0 - 25.0 Final   • Anion Gap 01/17/2025 10.4  5.0 - 15.0 mmol/L Final   • eGFR 01/17/2025 105.8  >60.0 mL/min/1.73 Final      HEPATITIS C GENOTYPE (11/12/2024 08:03)  - Miscellaneous Test (11/12/2024 08:03)  HIV-1/O/2 ANTIGEN/ANTIBODY (11/12/2024 08:03)  PROTIME-INR (11/12/2024 08:03)  COMPREHENSIVE METABOLIC PANEL (11/12/2024 08:03)  CBC (NO DIFF) (11/12/2024 08:03)  HEPATITIS B SURFACE ANTIBODY (11/12/2024 08:03)  HEPATITIS A ANTIBODY, TOTAL (11/12/2024 08:03)  HEPATITIS PANEL, ACUTE (11/12/2024 08:03)  HEPATITIS C RNA, QUANTITATIVE, PCR (GRAPH) (11/12/2024 08:03)     CTAP 6/2024  FINDINGS:  Calcified granulomas in the liver and  spleen.  Diffusely hypodense liver consistent with hepatic steatosis. Mildly nodular liver contours suggestive of hepatic cirrhosis.  Normal kidneys.  Normal adrenal glands.  Normal pancreas.  No visible gallstones. No biliary dilation.  No evidence of bowel obstruction. Thickening of the walls of small bowel loops in the left abdomen. No pneumatosis.  The appendix is not identified, however, no secondary signs of acute appendicitis.  Mild diffuse bladder wall thickening.  No ascites. No pneumoperitoneum.  No lymphadenopathy.  No acute fracture. Degenerative and postsurgical changes in the spine.  No abdominal aortic aneurysm.  IMPRESSION:  Small bowel wall thickening, which could be due to enteritis or underdistention.  Possible cystitis.  Nonemergent/incidental findings above.     Had CTAP 12/2024 no liver lesion    Labs 2/17/2025 glucose 195, sodium 137, creatinine 0.9, albumin 4.1, total bilirubin 1.5, alkaline phosphatase 71, AST 29, ALT 32, AFP elevated 13.7, HCVRNA not detected INR 0.98, white blood count 6.0, hemoglobin 15.4, hematocrit 43.7, MCV 98.0, platelets 222,000.    Labs 5/19/2025 sodium 132, glucose 300, creatinine 0.8, total bilirubin 0.6, alkaline phosphatase 73, AST 23, ALT 33, HCVRNA not detected    Assessment / Plan      1. History of hepatitis C virus infection  2. Alcoholic cirrhosis of liver without ascites, MELD 8 (11/2024), CTP 5 class A, MELD 8 (2/2025)  3. Alcohol abuse  He had chronic Hepatitis C infection, genotype 1a/1b, treatment naive, with compensated cirrhosis. He took Mavyret 3 tabs PO once daily for 8 weeks for Hepatitis C therapy. Had labs 4 weeks into treatment 1/2025 HCV RNA detected <15, labs end of tx 2/2025 HCV RNA not detected and finally labs 12 weeks s/p tx 5/2025 and HCV RNA not dected. Liver enzymes have normalized. Hep C is cured. He will always have positive Hep C antibody due to prior infection. He understands to avoid any high risk behavior to prevent any  reinfection after treatment. He is not immune to contract hep C again. Liver imaging last 12/2024 with CTAP, no focal liver lesion. Prior imaging showed fatty liver and cirrhosis. Had midlly elevated AFP at 13 on labs 2/2025, suspect related to cirrhosis and +HCV. He will need long term management at GI clinic for cirrhosis.      He is immune to hepatitis A and B, no vaccinations needed  Avoid NSAIDs  Should resume his meds for HTN- discuss with PCP  Low salt, mediterranean diet  Needs EGD for esophageal varices screening, he has declined   Moving to [place] in less than 2 weeks, discussed he should establish care with GI asap after his move  Stop all alcohol use  Needs CMP, CBC, AFP, INR repeated on next labs  Imaging of the liver q 6 mo for HCC surveillance, due 6/2025, will try to expedite at his request, wants in [place]  No further labs for Hep C required        Prior history  Colon cancer screening declined  Never had a colonoscopy. No family history of colon cancer. Declines all colon cancer screening tests. He understands that we may miss pathology in the colon without a colonoscopy. Other alternatives include cologuard which has been offered and declined.    Follow Up:   Return if symptoms worsen or fail to improve. He is [place] out of [place] and will not be able to follow up here.     Jody Anderson PA-C  [facility]  5/23/2025  14:15 EDT

## 2025-05-23 NOTE — PATIENT INSTRUCTIONS
Need to establish care with GI (gastroenterologist) to monitor cirrhosis of the liver after you move. Stop drinking. Hep C cured. Will always have positive Hep C antibody (marker in blood of the prior infection). You are not immune to Hep C so avoid any risky behaviors.

## 2025-05-23 NOTE — PROGRESS NOTES
Follow Up Note     Date: 2025   Patient Name: Albin Ivory  MRN: [id number]  : 1970     Primary Care Provider: Siobhan Lehman APRN     Chief Complaint   Patient presents with    Hepatitis C     Pt here for 12 wk post treatment follow up     History of present illness:   2025  Albin Ivory is a 54 y.o. male who is here today for follow up regarding Hepatitis C.    He started taking Mavyret 3 tabs p.o. once daily 2024.  He took this as directed for 8 weeks.  He did not miss any doses of medication.  He had labs prior to this appointment would like to discuss those results.  He is moving to [place] in less than 2 weeks.  No current illicit drug use.  Does admit continued alcohol use.  He admits he has not been taking his medications as directed for hypertension.  He notes that his blood pressure is high.  He does not want any other treatment for this at this moment.    Interval History:  2024  He found out about having Hepatitis C infection approx 5 years ago. He has not had prior treatment for hepatitis. Reports no known personal history of liver disease including other viral hepatitis. There is no known family history of liver disease or cirrhosis. He admits to previous IVDU and intranasal drug use. Has not used IV drugs in 8 years. He does have nonprofessional tattoos. He does currently drink alcohol, drinks 6 - 16oz  beers per day. Has drank daily for most of his life. He denies  current illicit drug us. He does use marijuana. He has had recent labs. He has had previous vaccinations for Hepatitis A and B. He does not drive. Lives with his friend currently. He does not have a phone.      He has not had previous colonoscopy. He does have any plans to have a colonoscopy. There is no known family history of colon cancer or colon polyps.    Subjective     Past Medical History:   Diagnosis Date    Back injury     Hypertension      Past Surgical History:   Procedure Laterality Date     APPENDECTOMY      BACK SURGERY  2017     History reviewed. No pertinent family history.  Social History     Socioeconomic History    Marital status: Legally    Tobacco Use    Smoking status: Every Day     Current packs/day: 1.50     Types: Cigarettes   Vaping Use    Vaping status: Never Used   Substance and Sexual Activity    Alcohol use: Not Currently     Comment: beer every couple days    Drug use: Yes     Types: Marijuana, Heroin, Cocaine(coke)     Comment: sober since 2012    Sexual activity: Defer     Current Outpatient Medications:     albuterol sulfate  (90 Base) MCG/ACT inhaler, Inhale 2 puffs., Disp: , Rfl:     Alcohol Swabs (Global Alcohol Prep Ease) 70 % pads, , Disp: , Rfl:     busPIRone (BUSPAR) 5 MG tablet, Take 1 tablet by mouth 3 times a day., Disp: , Rfl:     fenofibrate (TRICOR) 48 MG tablet, Take 1 tablet by mouth Daily for 30 days., Disp: 30 tablet, Rfl: 0    FLUoxetine (PROzac) 20 MG capsule, Take 1 capsule by mouth Daily., Disp: , Rfl:     ibuprofen (ADVIL,MOTRIN) 800 MG tablet, Take 1 tablet by mouth 3 times a day., Disp: , Rfl:     insulin aspart (NovoLOG FlexPen) 100 UNIT/ML solution pen-injector sc pen, Inject 5 Units under the skin into the appropriate area as directed 3 (Three) Times a Day With Meals., Disp: , Rfl:     insulin detemir (Levemir FlexPen) 100 UNIT/ML injection, Inject 20 Units under the skin into the appropriate area as directed Every Night., Disp: , Rfl:     Ketotifen Fumarate (ZADITOR) 0.035 % solution, Administer 1 drop to both eyes 2 (Two) Times a Day., Disp: , Rfl:     levothyroxine (SYNTHROID, LEVOTHROID) 25 MCG tablet, Take 1 tablet by mouth Daily., Disp: , Rfl:     lisinopril (PRINIVIL,ZESTRIL) 5 MG tablet, Take 1 tablet by mouth Daily., Disp: , Rfl:     Omega-3 Fatty Acids (fish oil) 1000 MG capsule capsule, Take 1 capsule by mouth Daily With Breakfast., Disp: , Rfl:     rosuvastatin (CRESTOR) 10 MG tablet, Take 1 tablet by mouth Daily., Disp: , Rfl:      Victoza 18 MG/3ML solution pen-injector injection, Inject 1.8 mg under the skin into the appropriate area as directed Daily., Disp: , Rfl:     vitamin D (ERGOCALCIFEROL) 1.25 MG (17812 UT) capsule capsule, Take 1 capsule by mouth Every 7 (Seven) Days., Disp: , Rfl:     Glecaprevir-Pibrentasvir (Mavyret) 100-40 MG tablet, Take 3 tablets by mouth Daily. Take all 3 tablets once daily with food, Disp: 84 tablet, Rfl: 1    insulin aspart (NovoLOG FlexPen) 100 UNIT/ML solution pen-injector sc pen, Inject 5 Units under the skin into the appropriate area as directed 3 (Three) Times a Day With Meals for 30 days., Disp: 4.5 mL, Rfl: 0    insulin detemir (Levemir FlexPen) 100 UNIT/ML injection, Inject 15 Units under the skin into the appropriate area as directed Daily for 30 days., Disp: 5 mL, Rfl: 0    metFORMIN (Glucophage) 500 MG tablet, Take 1 tablet by mouth Daily With Breakfast for 7 days, THEN 1 tablet 2 (Two) Times a Day With Meals for 7 days, THEN 1 tablet 3 (Three) Times a Day for 7 days, THEN 2 tablets 2 (Two) Times a Day With Meals for 7 days., Disp: 70 tablet, Rfl: 0    No Known Allergies    The following portions of the patient's history were reviewed and updated as appropriate: allergies, current medications, past family history, past medical history, past social history, past surgical history and problem list.    Objective     Physical Exam  Vitals:    05/23/25 1035   BP: (!) 163/112   BP Location: Left arm   Patient Position: Sitting   Cuff Size: Adult   Pulse: 106   Temp: 97.5 °F (36.4 °C)   SpO2: 95%   Weight: 121 kg (266 lb 6.4 oz)     Results Review:   I reviewed the patient's new clinical results.    Lab on 01/17/2025   Component Date Value Ref Range Status    Hepatitis C Quantitation 01/17/2025 <15  IU/mL Final                    HCV RNA detected    Test Information 01/17/2025 Comment   Final    The quantitative range of this assay is 15 IU/mL to 100 million IU/mL.    Glucose 01/17/2025 250 (H)  65 -  99 mg/dL Final    BUN 01/17/2025 11  6 - 20 mg/dL Final    Creatinine 01/17/2025 0.80  0.76 - 1.27 mg/dL Final    Sodium 01/17/2025 134 (L)  136 - 145 mmol/L Final    Potassium 01/17/2025 4.5  3.5 - 5.2 mmol/L Final    Chloride 01/17/2025 98  98 - 107 mmol/L Final    CO2 01/17/2025 25.6  22.0 - 29.0 mmol/L Final    Calcium 01/17/2025 9.4  8.6 - 10.5 mg/dL Final    Total Protein 01/17/2025 8.1  6.0 - 8.5 g/dL Final    Albumin 01/17/2025 4.1  3.5 - 5.2 g/dL Final    ALT (SGPT) 01/17/2025 24  1 - 41 U/L Final    AST (SGOT) 01/17/2025 24  1 - 40 U/L Final    Alkaline Phosphatase 01/17/2025 80  39 - 117 U/L Final    Total Bilirubin 01/17/2025 1.3 (H)  0.0 - 1.2 mg/dL Final    Globulin 01/17/2025 4.0  gm/dL Final    A/G Ratio 01/17/2025 1.0  g/dL Final    BUN/Creatinine Ratio 01/17/2025 13.8  7.0 - 25.0 Final    Anion Gap 01/17/2025 10.4  5.0 - 15.0 mmol/L Final    eGFR 01/17/2025 105.8  >60.0 mL/min/1.73 Final      HEPATITIS C GENOTYPE (11/12/2024 08:03)  - Miscellaneous Test (11/12/2024 08:03)  HIV-1/O/2 ANTIGEN/ANTIBODY (11/12/2024 08:03)  PROTIME-INR (11/12/2024 08:03)  COMPREHENSIVE METABOLIC PANEL (11/12/2024 08:03)  CBC (NO DIFF) (11/12/2024 08:03)  HEPATITIS B SURFACE ANTIBODY (11/12/2024 08:03)  HEPATITIS A ANTIBODY, TOTAL (11/12/2024 08:03)  HEPATITIS PANEL, ACUTE (11/12/2024 08:03)  HEPATITIS C RNA, QUANTITATIVE, PCR (GRAPH) (11/12/2024 08:03)     CTAP 6/2024  FINDINGS:  Calcified granulomas in the liver and spleen.  Diffusely hypodense liver consistent with hepatic steatosis. Mildly nodular liver contours suggestive of hepatic cirrhosis.  Normal kidneys.  Normal adrenal glands.  Normal pancreas.  No visible gallstones. No biliary dilation.  No evidence of bowel obstruction. Thickening of the walls of small bowel loops in the left abdomen. No pneumatosis.  The appendix is not identified, however, no secondary signs of acute appendicitis.  Mild diffuse bladder wall thickening.  No ascites. No  pneumoperitoneum.  No lymphadenopathy.  No acute fracture. Degenerative and postsurgical changes in the spine.  No abdominal aortic aneurysm.  IMPRESSION:  Small bowel wall thickening, which could be due to enteritis or underdistention.  Possible cystitis.  Nonemergent/incidental findings above.     Had CTAP 12/2024 no liver lesion    Labs 2/17/2025 glucose 195, sodium 137, creatinine 0.9, albumin 4.1, total bilirubin 1.5, alkaline phosphatase 71, AST 29, ALT 32, AFP elevated 13.7, HCVRNA not detected INR 0.98, white blood count 6.0, hemoglobin 15.4, hematocrit 43.7, MCV 98.0, platelets 222,000.    Labs 5/19/2025 sodium 132, glucose 300, creatinine 0.8, total bilirubin 0.6, alkaline phosphatase 73, AST 23, ALT 33, HCVRNA not detected    Assessment / Plan      1. History of hepatitis C virus infection  2. Alcoholic cirrhosis of liver without ascites, MELD 8 (11/2024), CTP 5 class A, MELD 8 (2/2025)  3. Alcohol abuse  He had chronic Hepatitis C infection, genotype 1a/1b, treatment naive, with compensated cirrhosis. He took Mavyret 3 tabs PO once daily for 8 weeks for Hepatitis C therapy. Had labs 4 weeks into treatment 1/2025 HCV RNA detected <15, labs end of tx 2/2025 HCV RNA not detected and finally labs 12 weeks s/p tx 5/2025 and HCV RNA not dected. Liver enzymes have normalized. Hep C is cured. He will always have positive Hep C antibody due to prior infection. He understands to avoid any high risk behavior to prevent any reinfection after treatment. He is not immune to contract hep C again. Liver imaging last 12/2024 with CTAP, no focal liver lesion. Prior imaging showed fatty liver and cirrhosis. Had midlly elevated AFP at 13 on labs 2/2025, suspect related to cirrhosis and +HCV. He will need long term management at GI clinic for cirrhosis.      He is immune to hepatitis A and B, no vaccinations needed  Avoid NSAIDs  Should resume his meds for HTN- discuss with PCP  Low salt, mediterranean diet  Needs EGD for  esophageal varices screening, he has declined   Moving to [place] in less than 2 weeks, discussed he should establish care with GI asap after his move  Stop all alcohol use  Needs CMP, CBC, AFP, INR repeated on next labs  Imaging of the liver q 6 mo for HCC surveillance, due 6/2025, will try to expedite at his request, wants in [place]  No further labs for Hep C required        Prior history  Colon cancer screening declined  Never had a colonoscopy. No family history of colon cancer. Declines all colon cancer screening tests. He understands that we may miss pathology in the colon without a colonoscopy. Other alternatives include cologuard which has been offered and declined.    Follow Up:   Return if symptoms worsen or fail to improve. He is [place] out of [place] and will not be able to follow up here.     Jody Anderson PA-C  [facility]  5/23/2025  14:15 EDT

## 2025-05-27 ENCOUNTER — TRANSCRIBE ORDERS (OUTPATIENT)
Dept: ADMINISTRATIVE | Age: 55
End: 2025-05-27

## 2025-05-27 DIAGNOSIS — K70.30 ALCOHOLIC CIRRHOSIS OF LIVER WITHOUT ASCITES (HCC): Primary | ICD-10-CM

## 2025-05-28 ENCOUNTER — TELEPHONE (OUTPATIENT)
Dept: GASTROENTEROLOGY | Facility: CLINIC | Age: 55
End: 2025-05-28
Payer: MEDICAID

## 2025-05-28 NOTE — TELEPHONE ENCOUNTER
Called patient - LM - call office at [phone] choose option 4 - regarding Ultrasound appointment at [facility]

## 2025-05-30 ENCOUNTER — HOSPITAL ENCOUNTER (OUTPATIENT)
Dept: ULTRASOUND IMAGING | Facility: HOSPITAL | Age: 55
Discharge: HOME | End: 2025-05-30
Payer: MEDICAID

## 2025-05-30 DIAGNOSIS — K70.30 ALCOHOLIC CIRRHOSIS OF LIVER WITHOUT ASCITES (HCC): ICD-10-CM

## 2025-05-30 PROCEDURE — 76700 US EXAM ABDOM COMPLETE: CPT

## 2025-06-02 ENCOUNTER — TELEPHONE (OUTPATIENT)
Dept: GASTROENTEROLOGY | Facility: CLINIC | Age: 55
End: 2025-06-02
Payer: MEDICAID

## 2025-06-02 NOTE — TELEPHONE ENCOUNTER
Received US abd from [facility] [address] dated 5/30/2025, he had called asking for results this morning. I reviewed and note fatty liver, no liver lesions. Gave results to Antione at [facility] to give to pt.